# Patient Record
Sex: FEMALE | Race: WHITE | NOT HISPANIC OR LATINO | Employment: UNEMPLOYED | URBAN - METROPOLITAN AREA
[De-identification: names, ages, dates, MRNs, and addresses within clinical notes are randomized per-mention and may not be internally consistent; named-entity substitution may affect disease eponyms.]

---

## 2016-12-15 LAB
CBC, PLATELET CT, AND DIFF: (no result)
EXTERNAL ABO GROUPING: (no result)
EXTERNAL ANTIBODY SCREEN: NORMAL
EXTERNAL HEPATITIS B SURFACE ANTIGEN: NEGATIVE
EXTERNAL RH FACTOR: POSITIVE
EXTERNAL RUBELLA QUALITATIVE: (no result)
EXTERNAL SYPHILIS RPR SCREEN: (no result)
HIV1 AB SPEC QL IA.RAPID: NORMAL

## 2017-01-12 VITALS
WEIGHT: 147 LBS | SYSTOLIC BLOOD PRESSURE: 122 MMHG | DIASTOLIC BLOOD PRESSURE: 58 MMHG | HEIGHT: 64 IN | BODY MASS INDEX: 25.1 KG/M2

## 2017-06-10 ENCOUNTER — HOSPITAL ENCOUNTER (OUTPATIENT)
Facility: HOSPITAL | Age: 27
Setting detail: OBSERVATION
Discharge: HOME | End: 2017-06-10
Attending: OBSTETRICS & GYNECOLOGY | Admitting: OBSTETRICS & GYNECOLOGY

## 2017-06-10 VITALS
RESPIRATION RATE: 18 BRPM | WEIGHT: 175 LBS | DIASTOLIC BLOOD PRESSURE: 57 MMHG | TEMPERATURE: 98.1 F | SYSTOLIC BLOOD PRESSURE: 111 MMHG | BODY MASS INDEX: 31.01 KG/M2 | HEART RATE: 108 BPM | HEIGHT: 63 IN

## 2017-06-10 PROBLEM — Z34.90 PATIENT CURRENTLY PREGNANT: Status: ACTIVE | Noted: 2017-06-10

## 2017-06-10 LAB
AMYLASE SERPL-CCNC: 42 U/L (ref 30–118)
ANION GAP SERPL CALCULATED.3IONS-SCNC: 12 MMOL/L (ref 3–11)
BUN BLD-MCNC: 6 MG/DL (ref 9–23)
BUN/CREAT SERPL: 10 (ref 7–25)
CALCIUM SPEC-SCNC: 9.3 MG/DL (ref 8.7–10.4)
CHLORIDE SERPL-SCNC: 109 MMOL/L (ref 99–109)
CO2 SERPL-SCNC: 22 MMOL/L (ref 20–31)
CREAT BLD-MCNC: 0.6 MG/DL (ref 0.6–1.3)
DEPRECATED RDW RBC AUTO: 47 FL (ref 37–54)
ERYTHROCYTE [DISTWIDTH] IN BLOOD BY AUTOMATED COUNT: 13.5 % (ref 11.3–14.5)
GFR SERPL CREATININE-BSD FRML MDRD: 120 ML/MIN/1.73
GLUCOSE BLD-MCNC: 108 MG/DL (ref 70–100)
HCT VFR BLD AUTO: 37.2 % (ref 34.5–44)
HGB BLD-MCNC: 12.4 G/DL (ref 11.5–15.5)
LIPASE SERPL-CCNC: 28 U/L (ref 6–51)
MCH RBC QN AUTO: 32.2 PG (ref 27–31)
MCHC RBC AUTO-ENTMCNC: 33.3 G/DL (ref 32–36)
MCV RBC AUTO: 96.6 FL (ref 80–99)
PLATELET # BLD AUTO: 215 10*3/MM3 (ref 150–450)
PMV BLD AUTO: 9.9 FL (ref 6–12)
POTASSIUM BLD-SCNC: 3.4 MMOL/L (ref 3.5–5.5)
RBC # BLD AUTO: 3.85 10*6/MM3 (ref 3.89–5.14)
SODIUM BLD-SCNC: 143 MMOL/L (ref 132–146)
WBC NRBC COR # BLD: 12.44 10*3/MM3 (ref 3.5–10.8)

## 2017-06-10 PROCEDURE — 96375 TX/PRO/DX INJ NEW DRUG ADDON: CPT

## 2017-06-10 PROCEDURE — 25010000002 ONDANSETRON PER 1 MG: Performed by: OBSTETRICS & GYNECOLOGY

## 2017-06-10 PROCEDURE — 25010000002 METOCLOPRAMIDE PER 10 MG: Performed by: OBSTETRICS & GYNECOLOGY

## 2017-06-10 PROCEDURE — 82150 ASSAY OF AMYLASE: CPT | Performed by: OBSTETRICS & GYNECOLOGY

## 2017-06-10 PROCEDURE — 96376 TX/PRO/DX INJ SAME DRUG ADON: CPT

## 2017-06-10 PROCEDURE — G0378 HOSPITAL OBSERVATION PER HR: HCPCS

## 2017-06-10 PROCEDURE — 96374 THER/PROPH/DIAG INJ IV PUSH: CPT

## 2017-06-10 PROCEDURE — 83690 ASSAY OF LIPASE: CPT | Performed by: OBSTETRICS & GYNECOLOGY

## 2017-06-10 PROCEDURE — 59025 FETAL NON-STRESS TEST: CPT

## 2017-06-10 PROCEDURE — 80048 BASIC METABOLIC PNL TOTAL CA: CPT | Performed by: OBSTETRICS & GYNECOLOGY

## 2017-06-10 PROCEDURE — 85027 COMPLETE CBC AUTOMATED: CPT | Performed by: OBSTETRICS & GYNECOLOGY

## 2017-06-10 RX ORDER — SODIUM CHLORIDE 0.9 % (FLUSH) 0.9 %
1-10 SYRINGE (ML) INJECTION AS NEEDED
Status: DISCONTINUED | OUTPATIENT
Start: 2017-06-10 | End: 2017-06-10 | Stop reason: HOSPADM

## 2017-06-10 RX ORDER — LIDOCAINE HYDROCHLORIDE 10 MG/ML
5 INJECTION, SOLUTION INFILTRATION; PERINEURAL AS NEEDED
Status: DISCONTINUED | OUTPATIENT
Start: 2017-06-10 | End: 2017-06-10 | Stop reason: HOSPADM

## 2017-06-10 RX ORDER — ONDANSETRON 4 MG/1
4 TABLET, FILM COATED ORAL EVERY 8 HOURS PRN
Qty: 20 TABLET | Refills: 0 | Status: SHIPPED | OUTPATIENT
Start: 2017-06-10 | End: 2018-08-23

## 2017-06-10 RX ORDER — METOCLOPRAMIDE HYDROCHLORIDE 5 MG/ML
10 INJECTION INTRAMUSCULAR; INTRAVENOUS ONCE
Status: COMPLETED | OUTPATIENT
Start: 2017-06-10 | End: 2017-06-10

## 2017-06-10 RX ORDER — ONDANSETRON 2 MG/ML
4 INJECTION INTRAMUSCULAR; INTRAVENOUS EVERY 6 HOURS PRN
Status: DISCONTINUED | OUTPATIENT
Start: 2017-06-10 | End: 2017-06-10 | Stop reason: HOSPADM

## 2017-06-10 RX ORDER — DEXTROSE, SODIUM CHLORIDE, SODIUM LACTATE, POTASSIUM CHLORIDE, AND CALCIUM CHLORIDE 5; .6; .31; .03; .02 G/100ML; G/100ML; G/100ML; G/100ML; G/100ML
125 INJECTION, SOLUTION INTRAVENOUS CONTINUOUS
Status: DISCONTINUED | OUTPATIENT
Start: 2017-06-10 | End: 2017-06-10 | Stop reason: HOSPADM

## 2017-06-10 RX ORDER — ONDANSETRON 2 MG/ML
4 INJECTION INTRAMUSCULAR; INTRAVENOUS ONCE
Status: COMPLETED | OUTPATIENT
Start: 2017-06-10 | End: 2017-06-10

## 2017-06-10 RX ORDER — FERROUS SULFATE TAB EC 324 MG (65 MG FE EQUIVALENT) 324 (65 FE) MG
324 TABLET DELAYED RESPONSE ORAL
COMMUNITY
End: 2018-08-23

## 2017-06-10 RX ORDER — RANITIDINE 150 MG/1
300 TABLET ORAL 2 TIMES DAILY
COMMUNITY
End: 2018-08-23

## 2017-06-10 RX ORDER — FAMOTIDINE 10 MG/ML
20 INJECTION, SOLUTION INTRAVENOUS EVERY 12 HOURS SCHEDULED
Status: DISCONTINUED | OUTPATIENT
Start: 2017-06-10 | End: 2017-06-10 | Stop reason: HOSPADM

## 2017-06-10 RX ADMIN — FAMOTIDINE 20 MG: 10 INJECTION, SOLUTION INTRAVENOUS at 06:19

## 2017-06-10 RX ADMIN — SODIUM CHLORIDE, SODIUM LACTATE, POTASSIUM CHLORIDE, CALCIUM CHLORIDE AND DEXTROSE MONOHYDRATE 125 ML/HR: 5; 600; 310; 30; 20 INJECTION, SOLUTION INTRAVENOUS at 06:22

## 2017-06-10 RX ADMIN — ONDANSETRON 4 MG: 2 INJECTION INTRAMUSCULAR; INTRAVENOUS at 18:20

## 2017-06-10 RX ADMIN — SODIUM CHLORIDE, SODIUM LACTATE, POTASSIUM CHLORIDE, CALCIUM CHLORIDE AND DEXTROSE MONOHYDRATE 125 ML/HR: 5; 600; 310; 30; 20 INJECTION, SOLUTION INTRAVENOUS at 09:08

## 2017-06-10 RX ADMIN — ONDANSETRON 4 MG: 2 INJECTION INTRAMUSCULAR; INTRAVENOUS at 06:20

## 2017-06-10 RX ADMIN — ONDANSETRON 4 MG: 2 INJECTION INTRAMUSCULAR; INTRAVENOUS at 11:45

## 2017-06-10 RX ADMIN — FAMOTIDINE 20 MG: 10 INJECTION, SOLUTION INTRAVENOUS at 18:19

## 2017-06-10 RX ADMIN — METOCLOPRAMIDE 10 MG: 5 INJECTION, SOLUTION INTRAMUSCULAR; INTRAVENOUS at 06:21

## 2017-06-10 RX ADMIN — SODIUM CHLORIDE, SODIUM LACTATE, POTASSIUM CHLORIDE, CALCIUM CHLORIDE AND DEXTROSE MONOHYDRATE 125 ML/HR: 5; 600; 310; 30; 20 INJECTION, SOLUTION INTRAVENOUS at 17:47

## 2017-06-10 NOTE — PLAN OF CARE
Problem: Nausea/Vomiting in Pregnancy, Includes Hyperemesis (Adult,Obstetrics,Pediatric)  Goal: Signs and Symptoms of Listed Potential Problems Will be Absent or Manageable (Nausea/Vomiting in Pregnancy, Includes Hyperemesis)  Outcome: Ongoing (interventions implemented as appropriate)    06/10/17 0837   Nausea/Vomiting in Pregnancy, Includes Hyperemesis   Problems Assessed (Nausea/Vomiting/Hyperemesis in Pregnancy) all   Problems Present (Nausea/Vomiting/Hyperemesis in Pregnancy) fluid/electrolyte imbalance;nutritional deficiency/inadequate oral intake

## 2017-06-10 NOTE — PLAN OF CARE
Problem: Nausea/Vomiting in Pregnancy, Includes Hyperemesis (Adult,Obstetrics,Pediatric)  Intervention: Monitor/Manage Fluid Electrolyte Balance    06/10/17 0837   Positioning   Positioning semi-Fowlers   Nutrition Interventions   Fluid/Electrolyte Management intravenous fluid replacement initiated;oral rehydration therapy initiated

## 2017-06-10 NOTE — H&P
"[facility]  Obstetric History and Physical    Chief Complaint   Patient presents with   • Vomiting     started ~230am after eating [employer] the evening prior approx 2030. NVD all in rapid sequence over the next ~2hrs w/\"10 bouts of diarrhea back to back.\"       Subjective     Patient is a 27 y.o. female  currently at 32w1d, who presented to labor and delivery with nausea, vomiting, and diarrhea.  She reports eating fast food at [employer] at approximately 8:30pm last night.  She began to experience \"flu-like symptoms\" around 10:30pm.  She reports n/v/d and abdominal pain began at 2:30 this morning.  Her last episode of vomiting was at 5:30am.  She has tolerated sips of clears after IV zofran.      Her prenatal care is benign.  Her previous obstetric/gynecological history is noted for is non-contributory.    The following portions of the patients history were reviewed and updated as appropriate: current medications, allergies, past medical history, past surgical history, past family history, past social history and problem list .       Prenatal Information:  Prenatal Results         1st Trimester Ref. Range Date Time   CBC with auto diff ^ 12.5/36.8   12/15/16    Rubella IgG       Hepatitis B SAg ^ Negative   12/15/16    RPR ^ Non-Reactive   12/15/16    ABO ^ O   12/15/16    Rh ^ Positive   12/15/16    Anibody Screen ^ Normal  Normal 12/15/16    HIV       Varicella IgG       Urinalysis with microscopy       Urine Culture       GC/Chlamydia/TV       ThinPrep/Pap       2nd and 3rd Trimester Ref. Range Date Time   Hemoglobin / Hematocrit  37.2 % 34.5 - 44.0 % 06/10/17 0627   Hemoglobin  12.4 g/dL 11.5 - 15.5 g/dL 06/10/17 0627   Group B Strep Culture       Glucose Challenge Test 1 Hr       Glucose Fasting       Glucose 1 Hr       Glucose 2 Hr       Glucose 3 Hr       Pre-eclampsia Panel       Risk Screening Ref. Range Date Time   Fetal Fibronectin       Amnisure       Hepatitis C Antibody       Hemoglobin " electrophoresis       Cystic Fibrosis       Hemoglobin A1C       MSAFP - 4       NIPT       AFP       Parvovirus IgG       Parvovirus IgM       POCT - glucose       [facility]       24 Hour urine - Total protein       24 Hour urine - Creatinine clearance       Urinalysis with microscopy       Urine Culture       Drug Screening Ref. Range Date Time   Amphetamine Screen       Barbiturate Screen       Benzodiazepine Screen       Methadone Screen       Phencyclidine Screen       Opiates Screen       THC Screen       Cocaine Screen       Propoxyphene Screen       Buprenorphine Screen       Methamphetamine Screen       Oxycodone Screen       Tryicyclic Antidepressants Screen              Legend: ^: Historical            View all results for this pregnancy        External Prenatal Results         Pregnancy Outside Results - these were transcribed from office records.  See scanned records for details. Date Time   Hgb      Hct      ABO ^ O  12/15/16    Rh ^ Positive  12/15/16    Antibody Screen ^ Normal  12/15/16    Glucose Fasting GTT      Glucose Tolerance Test 1 hour      Glucose Tolerance Test 3 hour      Gonorrhea (discrete)      Chlamydia (discrete)      RPR ^ Non-Reactive  12/15/16    VDRL      Syphillis Antibody      Rubella ^ Immune  12/15/16    HBsAg ^ Negative  12/15/16    Herpes Simplex Virus PCR      Herpes Simplex VIrus Culture      HIV ^ Not Performed  12/15/16    Hep C RNA Quant PCR      Hep C Antibody      Urine Drug Screen      AFP      Group B Strep      GBS Susceptibility to Clindamycin      GBS Susceptibility to Eythromycin      Fetal Fibronectin      Genetic Testing, Maternal Blood             Legend: ^: Historical           Past OB History:     Obstetric History       T0      TAB0   SAB0   E0   M0   L0       # Outcome Date GA Lbr Manuel/2nd Weight Sex Delivery Anes PTL Lv   1 Current                   Past Medical History: Past Medical History:   Diagnosis Date   • Abnormal Pap smear of  cervix age 18    HPV, normal Paps since   • Anxiety and depression 2013   • Back pain 2013   • Fibromyalgia 2013   • Genital herpes during pregnancy     last outbreak was @ conception   • H/O colonoscopy with polypectomy 2016   • Head ache    • HPV (human papilloma virus) infection    • Panic attack 2013   • Urinary tract infection       Past Surgical History Past Surgical History:   Procedure Laterality Date   • ADENOIDECTOMY     • TONSILLECTOMY  2014   • TONSILLECTOMY Bilateral 1994   • UMBILICAL HERNIA REPAIR  1991      Family History: Family History   Problem Relation Age of Onset   • Hypertension Mother    • Diabetes Mother    • Colon polyps Mother    • Hypertension Father    • Diabetes Father    • Heart attack Father    • Alcohol abuse Maternal Uncle    • Liver disease Maternal Uncle    • Colon cancer Neg Hx    • Liver cancer Neg Hx    • Stomach cancer Neg Hx    • Esophageal cancer Neg Hx       Social History:  reports that she quit smoking about 17 months ago. She has a 1.50 pack-year smoking history. She has never used smokeless tobacco.   reports that she drinks alcohol.   reports that she does not use illicit drugs.        Review of Systems   Constitutional: Negative.    HENT: Negative.    Eyes: Negative.    Respiratory: Negative.    Cardiovascular: Negative.    Gastrointestinal: Positive for abdominal pain, diarrhea, nausea and vomiting. Negative for abdominal distention, anal bleeding, blood in stool, constipation and rectal pain.   Endocrine: Negative.    Genitourinary: Negative.    Musculoskeletal: Negative.    Skin: Negative.    Allergic/Immunologic: Negative.    Neurological: Negative.    Hematological: Negative.    Psychiatric/Behavioral: Negative.          Objective     Vital Signs Range for the last 24 hours  Temperature: Temp:  [98.1 °F (36.7 °C)-98.5 °F (36.9 °C)] 98.5 °F (36.9 °C)   Temp Source: Temp src: Oral   BP: BP: (125-128)/(75-86) 125/75   Pulse: Heart Rate:  [114-122] 114    Respirations: Resp:  [18] 18   SPO2:     O2 Amount (l/min):     O2 Devices     Weight: Weight:  [175 lb (79.4 kg)] 175 lb (79.4 kg)     Physical Examination: General appearance - alert, well appearing, and in no distress  Chest - clear to auscultation, no wheezes, rales or rhonchi, symmetric air entry  Heart - normal rate, regular rhythm, normal S1, S2, no murmurs, rubs, clicks or gallops  Abdomen - soft, nontender, nondistended, no masses or organomegaly  Musculoskeletal - no joint tenderness, deformity or swelling  Extremities - peripheral pulses normal, no pedal edema, no clubbing or cyanosis    Presentation: vertex   Cervix: Exam by:  defer   Dilation:     Effacement:     Station:       Fetal Heart Rate Assessment   Method: Fetal HR Assessment Method: external   Beats/min: Fetal HR (Beats/Min): 145   Baseline: Fetal HR Baseline: normal range (110-160 bpm)   Varibility: Fetal HR Variability: moderate (amplitude range 6 to 25 bpm)   Accels: Fetal HR Accelerations: episodic, lasting at least 15 seconds, greater than/equal to 15 bpm   Decels: Fetal HR Decelerations: absent   Tracing Category:       Uterine Assessment   Method: Method: TOCO (external toco transducer)   Frequency (min):     Ctx Count in 10 min: Contraction Frequency in 10min: 1   Duration: Contraction Duration (sec): 40   Intensity: Contraction Intensity: no contractions   Intensity by IUPC:     Resting Tone: Uterine Resting Tone: soft by palpation   Resting Tone by IUPC:     [place] Units:       Laboratory Results: WNL, see labs on chart      Assessment/Plan     Active Problems:    Patient currently pregnant      Assessment & Plan    Assessment:  1.  Intrauterine pregnancy at 32w1d weeks gestation with reactive fetal status.    2.  Gastrointestinal illness, possible food bourne illness  3.  Obstetrical history significant for is non-contributory.  4.  GBS status: No results found for: GBSANTIGEN    Plan:  1. Continue observation and hydration.   Will consider discharge home this evening if no n/v/d and tolerating po.   2. Plan of care has been reviewed with patient and she verbalizes understanding  3.  Risks, benefits of treatment plan have been discussed.  4.  All questions have been answered.        Katie Andrew CNM  6/10/2017  4:16 PM

## 2017-06-10 NOTE — PLAN OF CARE
Problem: Nausea/Vomiting in Pregnancy, Includes Hyperemesis (Adult,Obstetrics,Pediatric)  Intervention: Promote/Monitor Maternal/Fetal Well-being    06/10/17 0837   Positioning   Positioning semi-Fowlers   Promote Fetal Well-being   Fetal Movement active   Fetal HR Assessment Method external   Fetal HR (Beats/Min) 145   Fetal HR Baseline normal range (110-160 bpm)   Fetal HR Variability moderate (amplitude range 6 to 25 bpm)   Fetal HR Accelerations episodic;lasting at least 15 seconds;greater than/equal to 15 bpm   Fetal HR Decelerations absent

## 2017-06-14 ENCOUNTER — HOSPITAL ENCOUNTER (OUTPATIENT)
Facility: HOSPITAL | Age: 27
Setting detail: OBSERVATION
Discharge: HOME | End: 2017-06-15
Attending: OBSTETRICS & GYNECOLOGY | Admitting: OBSTETRICS & GYNECOLOGY

## 2017-06-14 VITALS
HEART RATE: 85 BPM | HEIGHT: 63 IN | DIASTOLIC BLOOD PRESSURE: 71 MMHG | WEIGHT: 175 LBS | SYSTOLIC BLOOD PRESSURE: 124 MMHG | RESPIRATION RATE: 18 BRPM | TEMPERATURE: 98.2 F | BODY MASS INDEX: 31.01 KG/M2

## 2017-06-14 PROCEDURE — 59025 FETAL NON-STRESS TEST: CPT

## 2017-06-14 PROCEDURE — G0378 HOSPITAL OBSERVATION PER HR: HCPCS

## 2017-06-14 RX ORDER — PROMETHAZINE HYDROCHLORIDE 12.5 MG/1
12.5 TABLET ORAL EVERY 6 HOURS PRN
COMMUNITY
End: 2017-08-09 | Stop reason: HOSPADM

## 2017-06-15 PROBLEM — M79.661 RIGHT CALF PAIN: Status: ACTIVE | Noted: 2017-06-15

## 2017-06-15 PROCEDURE — 59025 FETAL NON-STRESS TEST: CPT

## 2017-06-15 PROCEDURE — 59025 FETAL NON-STRESS TEST: CPT | Performed by: OBSTETRICS & GYNECOLOGY

## 2017-06-15 PROCEDURE — 99218 PR INITIAL OBSERVATION CARE/DAY 30 MINUTES: CPT | Performed by: OBSTETRICS & GYNECOLOGY

## 2017-06-15 NOTE — NURSING NOTE
Discharge instructions reviewed with pt and pt's family. Pt denies any further questions and declines wheelchair ride off unit. Pt ambulated off unit accompanied by spouse and mother.

## 2017-06-15 NOTE — H&P
"Georgie Winston  1990  [id number]  [id number]    CC: rt leg swelling and pain  HPI:  Patient is 27 y.o. white female   currently at 33w1d  Presents with c/o rt calf and ankle pain for past 3 days.  Pt describes her pain as \"stiffness\" and pain with walking.  Pt also says calf feels tight.  Denies hx clots.  Pt recently with food poisoning over the weekend.  Pt's mother with hx of DVT (no formal w/u, only had one DVT).  BPNC to date except controlled asthma.  Good FM    PMH:   Current meds PNV, Fe, stool softener, phenergan prn, inhaler, zantac  Illnesses asthma, depression/anxiety, UTI's , GERD  Surgeries umb hernia repair, T and A, EGD, colonoscopy  Allergies Cipro-\"altered mental state\",                 Azithromycin and Erythromycin-severe N,V                Demerol-hives     IV contrast-anaphylactic reaction     shellfish    Past OB History:       Obstetric History       T0      TAB0   SAB0   E0   M0   L0       # Outcome Date GA Lbr Manuel/2nd Weight Sex Delivery Anes PTL Lv   1 Current                        SH: tob neg , EtOH neg, drugs neg  FH: heart dz pos , diabetes pos , cancer pos    General ROS: All systems reviewed and negative except for N, tingling legs, edema      Physical Examination: General appearance - alert, well appearing, and in no distress  Vital signs - /71 (BP Location: Left arm, Patient Position: Sitting)  Pulse 85  Temp 98.2 °F (36.8 °C) (Oral)   Resp 18  Ht 63\" (160 cm)  Wt 175 lb (79.4 kg)  LMP 10/25/2016  BMI 31 kg/m2  HEENT: normocephalic, atraumatic, pharynx clear   Neck - supple, no significant adenopathy  Lymphatics - no palpable lymphadenopathy, no hepatosplenomegaly  Chest - clear to auscultation, no wheezes, rales or rhonchi, symmetric air entry  Heart - normal rate, regular rhythm, normal S1, S2, no murmurs, rubs, clicks or gallops, no JVD  Abdomen - soft, nontender, nondistended, no masses or organomegaly  no rebound tenderness noted, bowel " sounds normal  Vaginal Exam: deferred  Extremities - tract pedal edema noted, rt and leg calf measure 41 cm at mid-shin, no apprec calf tend, no warmth, neg Corky's sign bilat, distal pedal pulses normal bilat  Skin - normal coloration and turgor, no rashes, no suspicious skin lesions noted        Fetal monitoring: indication leg pain , onset 2322 , offset 0006 , baseline 135 , mod BTB variability , multiple accels (10 X 10), no decels, rare contractions, interpretation reactive NST    Radiology     Assessment 1)IUP 33 1/7 weeks       2)rt leg pain-not c/w DVT, both legs = diameter, no tend, warmth, pain- prob due to usual leg cramps    Plan 1)observe       2)rec tx with magnesium (almonds) and Ca (milk, yogurt, etc)    3)keep next appt    Antione Vazquez MD  6/15/2017  12:07 AM

## 2017-07-08 LAB — EXTERNAL GROUP B STREP ANTIGEN: NEGATIVE

## 2017-07-10 ENCOUNTER — HOSPITAL ENCOUNTER (OUTPATIENT)
Facility: HOSPITAL | Age: 27
Setting detail: OBSERVATION
Discharge: HOME | End: 2017-07-10
Attending: OBSTETRICS & GYNECOLOGY | Admitting: OBSTETRICS & GYNECOLOGY

## 2017-07-10 VITALS
HEIGHT: 63 IN | SYSTOLIC BLOOD PRESSURE: 118 MMHG | DIASTOLIC BLOOD PRESSURE: 68 MMHG | WEIGHT: 182 LBS | TEMPERATURE: 97.6 F | RESPIRATION RATE: 18 BRPM | BODY MASS INDEX: 32.25 KG/M2 | HEART RATE: 83 BPM

## 2017-07-10 PROBLEM — Z34.90 PREGNANCY: Status: ACTIVE | Noted: 2017-07-10

## 2017-07-10 LAB
BACTERIA UR QL AUTO: ABNORMAL /HPF
BILIRUB UR QL STRIP: NEGATIVE
CLARITY UR: CLEAR
COLOR UR: YELLOW
GLUCOSE UR STRIP-MCNC: NEGATIVE MG/DL
HGB UR QL STRIP.AUTO: NEGATIVE
HYALINE CASTS UR QL AUTO: ABNORMAL /LPF
KETONES UR QL STRIP: NEGATIVE
LEUKOCYTE ESTERASE UR QL STRIP.AUTO: ABNORMAL
NITRITE UR QL STRIP: NEGATIVE
PH UR STRIP.AUTO: 7 [PH] (ref 5–8)
PROT UR QL STRIP: NEGATIVE
RBC # UR: ABNORMAL /HPF
REF LAB TEST METHOD: ABNORMAL
SP GR UR STRIP: 1.01 (ref 1–1.03)
SQUAMOUS #/AREA URNS HPF: ABNORMAL /HPF
UROBILINOGEN UR QL STRIP: ABNORMAL
WBC UR QL AUTO: ABNORMAL /HPF

## 2017-07-10 PROCEDURE — 81001 URINALYSIS AUTO W/SCOPE: CPT | Performed by: NURSE PRACTITIONER

## 2017-07-10 PROCEDURE — 59025 FETAL NON-STRESS TEST: CPT

## 2017-07-10 PROCEDURE — G0378 HOSPITAL OBSERVATION PER HR: HCPCS

## 2017-07-10 RX ORDER — VALACYCLOVIR HYDROCHLORIDE 500 MG/1
500 TABLET, FILM COATED ORAL DAILY
COMMUNITY
End: 2018-08-23

## 2017-07-10 NOTE — NURSING NOTE
Discharge instructions reviewed with patient and family. Instructed patient to return if contractions become regular and are 5-7 min apart, if leaking fluid, vaginal bleeding or decreased fetal movement. Patient verbalized understanding. Patient walked off unit with mother, significant other and all of her belongings.

## 2017-07-10 NOTE — H&P
[facility]  Obstetric History and Physical    Chief Complaint   Patient presents with   • Contractions       Subjective     Patient is a 27 y.o. female  currently at 36w5d, who presents with complaints of contractions since 0400, irregular. She reports emesis x 1 this am and increased acid reflux. She reports positive fetal movement. Denies vaginal bleeding or loss of fluid.     Her prenatal care is benign.  Her previous obstetric/gynecological history is noted for is non-contributory.    The following portions of the patients history were reviewed and updated as appropriate: current medications, allergies, past medical history, past surgical history, past family history, past social history and problem list .       Prenatal Information:  Prenatal Results         1st Trimester Ref. Range Date Time   CBC with auto diff ^ 12.5/36.8   12/15/16    Rubella IgG       Hepatitis B SAg ^ Negative   12/15/16    RPR ^ Non-Reactive   12/15/16    ABO ^ O   12/15/16    Rh ^ Positive   12/15/16    Anibody Screen ^ Normal  Normal 12/15/16    HIV       Varicella IgG       Urinalysis with microscopy       Urine Culture       GC/Chlamydia/TV       ThinPrep/Pap       2nd and 3rd Trimester Ref. Range Date Time   Hemoglobin / Hematocrit  37.2 % 34.5 - 44.0 % 06/10/17 0627   Hemoglobin  12.4 g/dL 11.5 - 15.5 g/dL 06/10/17 0627   Group B Strep Culture       Glucose Challenge Test 1 Hr       Glucose Fasting       Glucose 1 Hr       Glucose 2 Hr       Glucose 3 Hr       Pre-eclampsia Panel       Risk Screening Ref. Range Date Time   Fetal Fibronectin       Amnisure       Hepatitis C Antibody       Hemoglobin electrophoresis       Cystic Fibrosis       Hemoglobin A1C       MSAFP - 4       NIPT       AFP       Parvovirus IgG       Parvovirus IgM       POCT - glucose       [facility]       24 Hour urine - Total protein       24 Hour urine - Creatinine clearance       Urinalysis with microscopy       Urine Culture       Drug Screening Ref.  Range Date Time   Amphetamine Screen       Barbiturate Screen       Benzodiazepine Screen       Methadone Screen       Phencyclidine Screen       Opiates Screen       THC Screen       Cocaine Screen       Propoxyphene Screen       Buprenorphine Screen       Methamphetamine Screen       Oxycodone Screen       Tryicyclic Antidepressants Screen              Legend: ^: Historical            View all results for this pregnancy        External Prenatal Results         Pregnancy Outside Results - these were transcribed from office records.  See scanned records for details. Date Time   Hgb      Hct      ABO ^ O  12/15/16    Rh ^ Positive  12/15/16    Antibody Screen ^ Normal  12/15/16    Glucose Fasting GTT      Glucose Tolerance Test 1 hour      Glucose Tolerance Test 3 hour      Gonorrhea (discrete)      Chlamydia (discrete)      RPR ^ Non-Reactive  12/15/16    VDRL      Syphillis Antibody      Rubella ^ Immune  12/15/16    HBsAg ^ Negative  12/15/16    Herpes Simplex Virus PCR      Herpes Simplex VIrus Culture      HIV ^ Not Performed  12/15/16    Hep C RNA Quant PCR      Hep C Antibody      Urine Drug Screen      AFP      Group B Strep      GBS Susceptibility to Clindamycin      GBS Susceptibility to Eythromycin      Fetal Fibronectin      Genetic Testing, Maternal Blood             Legend: ^: Historical           Past OB History:     Obstetric History       T0      TAB0   SAB0   E0   M0   L0       # Outcome Date GA Lbr Manuel/2nd Weight Sex Delivery Anes PTL Lv   1 Current                   Past Medical History: Past Medical History:   Diagnosis Date   • Abnormal Pap smear of cervix age 18    HPV, normal Paps since   • Anxiety and depression    • Back pain    • Fibromyalgia    • Genital herpes during pregnancy     last outbreak was @ conception   • H/O colonoscopy with polypectomy    • Head ache    • HPV (human papilloma virus) infection    • Panic attack    • Thoracic nerve injury      left   • Urinary tract infection       Past Surgical History Past Surgical History:   Procedure Laterality Date   • ADENOIDECTOMY     • ENDOSCOPY AND COLONOSCOPY     • TONSILLECTOMY  2014   • TONSILLECTOMY Bilateral 1994   • UMBILICAL HERNIA REPAIR  1991      Family History: Family History   Problem Relation Age of Onset   • Hypertension Mother    • Diabetes Mother    • Colon polyps Mother    • Hypertension Father    • Diabetes Father    • Heart attack Father    • Alcohol abuse Maternal Uncle    • Liver disease Maternal Uncle    • Colon cancer Neg Hx    • Liver cancer Neg Hx    • Stomach cancer Neg Hx    • Esophageal cancer Neg Hx       Social History:  reports that she quit smoking about 18 months ago. She has a 1.50 pack-year smoking history. She has never used smokeless tobacco.   reports that she does not drink alcohol.   reports that she does not use illicit drugs.        Review of Systems   Gastrointestinal: Positive for nausea and vomiting.   All other systems reviewed and are negative.        Objective     Vital Signs Range for the last 24 hours  Temperature: Temp:  [98.1 °F (36.7 °C)] 98.1 °F (36.7 °C)   Temp Source: Temp src: Oral   BP:     Pulse:     Respirations: Resp:  [18] 18   SPO2:     O2 Amount (l/min):     O2 Devices     Weight: Weight:  [182 lb (82.6 kg)] 182 lb (82.6 kg)     Physical Examination: General appearance - alert, well appearing, and in no distress  Heart - normal rate, regular rhythm, normal S1, S2, no murmurs, rubs, clicks or gallops  Abdomen - soft, nontender, nondistended, no masses or organomegaly, gravid  Musculoskeletal - no joint tenderness, deformity or swelling  Extremities - peripheral pulses normal, 2+  pedal edema, no clubbing or cyanosis  Skin - normal coloration and turgor, no rashes, no suspicious skin lesions noted    Presentation:    Cervix: Exam by:     Dilation: Dilation: 1   Effacement: Cervical Effacement: 60%   Station:       Fetal Heart Rate Assessment   Method:      Beats/min: Fetal HR (Beats/Min): 140   Baseline:     Varibility: Fetal HR Variability: moderate (amplitude range 6 to 25 bpm)   Accels: Fetal HR Accelerations: greater than/equal to 15 bpm   Decels: Fetal HR Decelerations: absent   Tracing Category:       Uterine Assessment   Method:     Frequency (min): Contraction Frequency (min): x1   Ctx Count in 10 min:     Duration: Contraction Duration (sec): 120   Intensity:     Intensity by IUPC:     Resting Tone: Uterine Resting Tone: soft by palpation   Resting Tone by IUPC:     [place] Units:         Assessment/Plan     Active Problems:    Pregnancy      Assessment & Plan    Assessment:  1.  Intrauterine pregnancy at 36w5d weeks gestation with reactive, reassuring fetal status.  Reactive NST.  2.  Irregular contractions (2 in 1 hour)  3.ua wnl  Plan:  1. discharge to home with labor precautions. Keep scheduled appt with Dr Diop tomorrow.   2. Plan of care has been reviewed with patient and famliy  3.  Risks, benefits of treatment plan have been discussed.  4.  All questions have been answered.        Tamica Disla CNM  7/10/2017  7:05 PM

## 2017-08-04 ENCOUNTER — HOSPITAL ENCOUNTER (INPATIENT)
Facility: HOSPITAL | Age: 27
LOS: 5 days | Discharge: HOME | End: 2017-08-09
Attending: OBSTETRICS & GYNECOLOGY | Admitting: OBSTETRICS & GYNECOLOGY

## 2017-08-04 DIAGNOSIS — B37.0 THRUSH, ORAL: ICD-10-CM

## 2017-08-04 DIAGNOSIS — Z37.9 NORMAL LABOR: Primary | ICD-10-CM

## 2017-08-04 LAB
ABO GROUP BLD: NORMAL
ALP SERPL-CCNC: 110 U/L (ref 25–100)
ALT SERPL W P-5'-P-CCNC: 17 U/L (ref 7–40)
AST SERPL-CCNC: 28 U/L (ref 0–33)
BILIRUB SERPL-MCNC: 0.4 MG/DL (ref 0.3–1.2)
BLD GP AB SCN SERPL QL: NEGATIVE
CREAT BLD-MCNC: 0.5 MG/DL (ref 0.6–1.3)
DEPRECATED RDW RBC AUTO: 50 FL (ref 37–54)
ERYTHROCYTE [DISTWIDTH] IN BLOOD BY AUTOMATED COUNT: 14.4 % (ref 11.3–14.5)
HCT VFR BLD AUTO: 33.1 % (ref 34.5–44)
HGB BLD-MCNC: 11.1 G/DL (ref 11.5–15.5)
LDH SERPL-CCNC: 221 U/L (ref 120–246)
MCH RBC QN AUTO: 32.2 PG (ref 27–31)
MCHC RBC AUTO-ENTMCNC: 33.5 G/DL (ref 32–36)
MCV RBC AUTO: 95.9 FL (ref 80–99)
PLATELET # BLD AUTO: 185 10*3/MM3 (ref 150–450)
PMV BLD AUTO: 9.8 FL (ref 6–12)
RBC # BLD AUTO: 3.45 10*6/MM3 (ref 3.89–5.14)
RH BLD: POSITIVE
URATE SERPL-MCNC: 3.7 MG/DL (ref 3.1–7.8)
WBC NRBC COR # BLD: 8.51 10*3/MM3 (ref 3.5–10.8)

## 2017-08-04 PROCEDURE — 84460 ALANINE AMINO (ALT) (SGPT): CPT | Performed by: OBSTETRICS & GYNECOLOGY

## 2017-08-04 PROCEDURE — 84450 TRANSFERASE (AST) (SGOT): CPT | Performed by: OBSTETRICS & GYNECOLOGY

## 2017-08-04 PROCEDURE — 86900 BLOOD TYPING SEROLOGIC ABO: CPT | Performed by: OBSTETRICS & GYNECOLOGY

## 2017-08-04 PROCEDURE — 82247 BILIRUBIN TOTAL: CPT | Performed by: OBSTETRICS & GYNECOLOGY

## 2017-08-04 PROCEDURE — 82565 ASSAY OF CREATININE: CPT | Performed by: OBSTETRICS & GYNECOLOGY

## 2017-08-04 PROCEDURE — 59025 FETAL NON-STRESS TEST: CPT

## 2017-08-04 PROCEDURE — 85027 COMPLETE CBC AUTOMATED: CPT | Performed by: OBSTETRICS & GYNECOLOGY

## 2017-08-04 PROCEDURE — 84075 ASSAY ALKALINE PHOSPHATASE: CPT | Performed by: OBSTETRICS & GYNECOLOGY

## 2017-08-04 PROCEDURE — 84550 ASSAY OF BLOOD/URIC ACID: CPT | Performed by: OBSTETRICS & GYNECOLOGY

## 2017-08-04 PROCEDURE — 86901 BLOOD TYPING SEROLOGIC RH(D): CPT | Performed by: OBSTETRICS & GYNECOLOGY

## 2017-08-04 PROCEDURE — 86850 RBC ANTIBODY SCREEN: CPT | Performed by: OBSTETRICS & GYNECOLOGY

## 2017-08-04 PROCEDURE — 83615 LACTATE (LD) (LDH) ENZYME: CPT | Performed by: OBSTETRICS & GYNECOLOGY

## 2017-08-04 RX ORDER — LIDOCAINE HYDROCHLORIDE 10 MG/ML
5 INJECTION, SOLUTION INFILTRATION; PERINEURAL AS NEEDED
Status: DISCONTINUED | OUTPATIENT
Start: 2017-08-04 | End: 2017-08-06 | Stop reason: HOSPADM

## 2017-08-04 RX ORDER — OXYTOCIN/RINGER'S LACTATE 20/1000 ML
125 PLASTIC BAG, INJECTION (ML) INTRAVENOUS CONTINUOUS PRN
Status: DISPENSED | OUTPATIENT
Start: 2017-08-05 | End: 2017-08-05

## 2017-08-04 RX ORDER — OXYTOCIN/RINGER'S LACTATE 30/500 ML
1-4 PLASTIC BAG, INJECTION (ML) INTRAVENOUS
Status: CANCELLED | OUTPATIENT
Start: 2017-08-04

## 2017-08-04 RX ORDER — OXYTOCIN/RINGER'S LACTATE 20/1000 ML
999 PLASTIC BAG, INJECTION (ML) INTRAVENOUS ONCE
Status: DISCONTINUED | OUTPATIENT
Start: 2017-08-04 | End: 2017-08-06 | Stop reason: HOSPADM

## 2017-08-04 RX ORDER — CARBOPROST TROMETHAMINE 250 UG/ML
250 INJECTION, SOLUTION INTRAMUSCULAR AS NEEDED
Status: DISCONTINUED | OUTPATIENT
Start: 2017-08-04 | End: 2017-08-06 | Stop reason: HOSPADM

## 2017-08-04 RX ORDER — MISOPROSTOL 200 UG/1
800 TABLET ORAL AS NEEDED
Status: DISCONTINUED | OUTPATIENT
Start: 2017-08-04 | End: 2017-08-06 | Stop reason: HOSPADM

## 2017-08-04 RX ORDER — SODIUM CHLORIDE 0.9 % (FLUSH) 0.9 %
1-10 SYRINGE (ML) INJECTION AS NEEDED
Status: DISCONTINUED | OUTPATIENT
Start: 2017-08-04 | End: 2017-08-06 | Stop reason: HOSPADM

## 2017-08-04 RX ORDER — OXYTOCIN/RINGER'S LACTATE 30/500 ML
2-24 PLASTIC BAG, INJECTION (ML) INTRAVENOUS
Status: DISCONTINUED | OUTPATIENT
Start: 2017-08-05 | End: 2017-08-09 | Stop reason: HOSPADM

## 2017-08-04 RX ORDER — SODIUM CHLORIDE, SODIUM LACTATE, POTASSIUM CHLORIDE, CALCIUM CHLORIDE 600; 310; 30; 20 MG/100ML; MG/100ML; MG/100ML; MG/100ML
125 INJECTION, SOLUTION INTRAVENOUS CONTINUOUS
Status: DISCONTINUED | OUTPATIENT
Start: 2017-08-04 | End: 2017-08-07

## 2017-08-04 RX ADMIN — SODIUM CHLORIDE, POTASSIUM CHLORIDE, SODIUM LACTATE AND CALCIUM CHLORIDE 125 ML/HR: 600; 310; 30; 20 INJECTION, SOLUTION INTRAVENOUS at 23:01

## 2017-08-04 RX ADMIN — Medication 2 MILLI-UNITS/MIN: at 23:45

## 2017-08-04 RX ADMIN — SODIUM CHLORIDE, POTASSIUM CHLORIDE, SODIUM LACTATE AND CALCIUM CHLORIDE 125 ML/HR: 600; 310; 30; 20 INJECTION, SOLUTION INTRAVENOUS at 18:30

## 2017-08-05 ENCOUNTER — ANESTHESIA EVENT (OUTPATIENT)
Dept: LABOR AND DELIVERY | Facility: HOSPITAL | Age: 27
End: 2017-08-05

## 2017-08-05 ENCOUNTER — ANESTHESIA (OUTPATIENT)
Dept: LABOR AND DELIVERY | Facility: HOSPITAL | Age: 27
End: 2017-08-05

## 2017-08-05 PROCEDURE — 25010000002 ROPIVACAINE PER 1 MG: Performed by: ANESTHESIOLOGY

## 2017-08-05 PROCEDURE — 25010000002 FENTANYL CITRATE (PF) 100 MCG/2ML SOLUTION: Performed by: ANESTHESIOLOGY

## 2017-08-05 PROCEDURE — C1755 CATHETER, INTRASPINAL: HCPCS

## 2017-08-05 PROCEDURE — 59025 FETAL NON-STRESS TEST: CPT

## 2017-08-05 PROCEDURE — 25010000002 BUTORPHANOL PER 1 MG: Performed by: OBSTETRICS & GYNECOLOGY

## 2017-08-05 PROCEDURE — 25010000002 METOCLOPRAMIDE PER 10 MG: Performed by: ANESTHESIOLOGY

## 2017-08-05 PROCEDURE — 25010000002 ONDANSETRON PER 1 MG: Performed by: ANESTHESIOLOGY

## 2017-08-05 PROCEDURE — C1755 CATHETER, INTRASPINAL: HCPCS | Performed by: ANESTHESIOLOGY

## 2017-08-05 PROCEDURE — 25010000003 MORPHINE PER 10 MG: Performed by: ANESTHESIOLOGY

## 2017-08-05 PROCEDURE — 51702 INSERT TEMP BLADDER CATH: CPT

## 2017-08-05 RX ORDER — DIPHENHYDRAMINE HYDROCHLORIDE 50 MG/ML
12.5 INJECTION INTRAMUSCULAR; INTRAVENOUS EVERY 8 HOURS PRN
Status: DISCONTINUED | OUTPATIENT
Start: 2017-08-05 | End: 2017-08-06 | Stop reason: HOSPADM

## 2017-08-05 RX ORDER — MIDAZOLAM HYDROCHLORIDE 1 MG/ML
INJECTION INTRAMUSCULAR; INTRAVENOUS AS NEEDED
Status: DISCONTINUED | OUTPATIENT
Start: 2017-08-05 | End: 2017-08-06 | Stop reason: SURG

## 2017-08-05 RX ORDER — ONDANSETRON 2 MG/ML
4 INJECTION INTRAMUSCULAR; INTRAVENOUS ONCE AS NEEDED
Status: DISCONTINUED | OUTPATIENT
Start: 2017-08-05 | End: 2017-08-06 | Stop reason: HOSPADM

## 2017-08-05 RX ORDER — EPHEDRINE SULFATE/0.9% NACL/PF 50 MG/10ML
10 SYRINGE (ML) INTRAVENOUS
Status: DISCONTINUED | OUTPATIENT
Start: 2017-08-05 | End: 2017-08-06 | Stop reason: HOSPADM

## 2017-08-05 RX ORDER — LIDOCAINE HYDROCHLORIDE AND EPINEPHRINE 20; 5 MG/ML; UG/ML
INJECTION, SOLUTION EPIDURAL; INFILTRATION; INTRACAUDAL; PERINEURAL AS NEEDED
Status: DISCONTINUED | OUTPATIENT
Start: 2017-08-05 | End: 2017-08-06 | Stop reason: SURG

## 2017-08-05 RX ORDER — FENTANYL CITRATE 50 UG/ML
INJECTION, SOLUTION INTRAMUSCULAR; INTRAVENOUS AS NEEDED
Status: DISCONTINUED | OUTPATIENT
Start: 2017-08-05 | End: 2017-08-06 | Stop reason: SURG

## 2017-08-05 RX ORDER — TRISODIUM CITRATE DIHYDRATE AND CITRIC ACID MONOHYDRATE 500; 334 MG/5ML; MG/5ML
30 SOLUTION ORAL ONCE
Status: DISCONTINUED | OUTPATIENT
Start: 2017-08-05 | End: 2017-08-06 | Stop reason: HOSPADM

## 2017-08-05 RX ORDER — CEFAZOLIN SODIUM 2 G/100ML
2 INJECTION, SOLUTION INTRAVENOUS ONCE
Status: COMPLETED | OUTPATIENT
Start: 2017-08-06 | End: 2017-08-06

## 2017-08-05 RX ORDER — METOCLOPRAMIDE HYDROCHLORIDE 5 MG/ML
10 INJECTION INTRAMUSCULAR; INTRAVENOUS ONCE AS NEEDED
Status: COMPLETED | OUTPATIENT
Start: 2017-08-05 | End: 2017-08-05

## 2017-08-05 RX ORDER — LIDOCAINE HYDROCHLORIDE AND EPINEPHRINE 15; 5 MG/ML; UG/ML
INJECTION, SOLUTION EPIDURAL AS NEEDED
Status: DISCONTINUED | OUTPATIENT
Start: 2017-08-05 | End: 2017-08-06 | Stop reason: SURG

## 2017-08-05 RX ORDER — ONDANSETRON 2 MG/ML
INJECTION INTRAMUSCULAR; INTRAVENOUS AS NEEDED
Status: DISCONTINUED | OUTPATIENT
Start: 2017-08-05 | End: 2017-08-06 | Stop reason: SURG

## 2017-08-05 RX ORDER — MORPHINE SULFATE 0.5 MG/ML
INJECTION, SOLUTION EPIDURAL; INTRATHECAL; INTRAVENOUS AS NEEDED
Status: DISCONTINUED | OUTPATIENT
Start: 2017-08-05 | End: 2017-08-06 | Stop reason: SURG

## 2017-08-05 RX ORDER — ACETAMINOPHEN 500 MG
1000 TABLET ORAL ONCE
Status: COMPLETED | OUTPATIENT
Start: 2017-08-05 | End: 2017-08-05

## 2017-08-05 RX ORDER — FAMOTIDINE 10 MG/ML
INJECTION, SOLUTION INTRAVENOUS AS NEEDED
Status: DISCONTINUED | OUTPATIENT
Start: 2017-08-05 | End: 2017-08-06 | Stop reason: SURG

## 2017-08-05 RX ORDER — CEFAZOLIN SODIUM 2 G/100ML
2 INJECTION, SOLUTION INTRAVENOUS ONCE
Status: DISCONTINUED | OUTPATIENT
Start: 2017-08-06 | End: 2017-08-05 | Stop reason: SDUPTHER

## 2017-08-05 RX ORDER — FAMOTIDINE 10 MG/ML
20 INJECTION, SOLUTION INTRAVENOUS ONCE AS NEEDED
Status: COMPLETED | OUTPATIENT
Start: 2017-08-05 | End: 2017-08-05

## 2017-08-05 RX ORDER — BUPIVACAINE HYDROCHLORIDE 7.5 MG/ML
INJECTION, SOLUTION EPIDURAL; RETROBULBAR AS NEEDED
Status: DISCONTINUED | OUTPATIENT
Start: 2017-08-05 | End: 2017-08-06 | Stop reason: SURG

## 2017-08-05 RX ORDER — ONDANSETRON 4 MG/1
4 TABLET, FILM COATED ORAL EVERY 6 HOURS PRN
Status: DISCONTINUED | OUTPATIENT
Start: 2017-08-05 | End: 2017-08-07

## 2017-08-05 RX ADMIN — CEFAZOLIN SODIUM 2 G: 2 INJECTION, SOLUTION INTRAVENOUS at 23:30

## 2017-08-05 RX ADMIN — MIDAZOLAM HYDROCHLORIDE 1 MG: 1 INJECTION, SOLUTION INTRAMUSCULAR; INTRAVENOUS at 23:40

## 2017-08-05 RX ADMIN — FENTANYL CITRATE 100 MCG: 50 INJECTION, SOLUTION INTRAMUSCULAR; INTRAVENOUS at 10:16

## 2017-08-05 RX ADMIN — FENTANYL CITRATE 100 MCG: 50 INJECTION, SOLUTION INTRAMUSCULAR; INTRAVENOUS at 17:42

## 2017-08-05 RX ADMIN — SODIUM CHLORIDE, POTASSIUM CHLORIDE, SODIUM LACTATE AND CALCIUM CHLORIDE 125 ML/HR: 600; 310; 30; 20 INJECTION, SOLUTION INTRAVENOUS at 06:40

## 2017-08-05 RX ADMIN — LIDOCAINE HYDROCHLORIDE,EPINEPHRINE BITARTRATE 7 ML: 20; .005 INJECTION, SOLUTION EPIDURAL; INFILTRATION; INTRACAUDAL; PERINEURAL at 21:56

## 2017-08-05 RX ADMIN — LIDOCAINE HYDROCHLORIDE AND EPINEPHRINE 3 ML: 15; 5 INJECTION, SOLUTION EPIDURAL at 10:10

## 2017-08-05 RX ADMIN — LIDOCAINE HYDROCHLORIDE AND EPINEPHRINE 2 ML: 15; 5 INJECTION, SOLUTION EPIDURAL at 10:12

## 2017-08-05 RX ADMIN — LIDOCAINE HYDROCHLORIDE,EPINEPHRINE BITARTRATE 5 ML: 20; .005 INJECTION, SOLUTION EPIDURAL; INFILTRATION; INTRACAUDAL; PERINEURAL at 15:40

## 2017-08-05 RX ADMIN — ACETAMINOPHEN 1000 MG: 500 TABLET ORAL at 22:37

## 2017-08-05 RX ADMIN — SODIUM CHLORIDE, POTASSIUM CHLORIDE, SODIUM LACTATE AND CALCIUM CHLORIDE: 600; 310; 30; 20 INJECTION, SOLUTION INTRAVENOUS at 23:39

## 2017-08-05 RX ADMIN — ROPIVACAINE HYDROCHLORIDE 11 ML: 5 INJECTION, SOLUTION EPIDURAL; INFILTRATION; PERINEURAL at 10:15

## 2017-08-05 RX ADMIN — BUTORPHANOL TARTRATE 1 MG: 2 INJECTION, SOLUTION INTRAMUSCULAR; INTRAVENOUS at 09:36

## 2017-08-05 RX ADMIN — ROPIVACAINE HYDROCHLORIDE 16 ML/HR: 5 INJECTION, SOLUTION EPIDURAL; INFILTRATION; PERINEURAL at 10:18

## 2017-08-05 RX ADMIN — FAMOTIDINE 20 MG: 10 INJECTION, SOLUTION INTRAVENOUS at 23:40

## 2017-08-05 RX ADMIN — LIDOCAINE HYDROCHLORIDE,EPINEPHRINE BITARTRATE 6 ML: 20; .005 INJECTION, SOLUTION EPIDURAL; INFILTRATION; INTRACAUDAL; PERINEURAL at 17:42

## 2017-08-05 RX ADMIN — ONDANSETRON 4 MG: 4 TABLET, FILM COATED ORAL at 15:46

## 2017-08-05 RX ADMIN — BUTORPHANOL TARTRATE 1 MG: 2 INJECTION, SOLUTION INTRAMUSCULAR; INTRAVENOUS at 07:24

## 2017-08-05 RX ADMIN — BUTORPHANOL TARTRATE 1 MG: 2 INJECTION, SOLUTION INTRAMUSCULAR; INTRAVENOUS at 05:07

## 2017-08-05 RX ADMIN — SODIUM CHLORIDE, POTASSIUM CHLORIDE, SODIUM LACTATE AND CALCIUM CHLORIDE 125 ML/HR: 600; 310; 30; 20 INJECTION, SOLUTION INTRAVENOUS at 12:24

## 2017-08-05 RX ADMIN — BUPIVACAINE HYDROCHLORIDE 1.3 ML: 7.5 INJECTION, SOLUTION EPIDURAL; RETROBULBAR at 23:50

## 2017-08-05 RX ADMIN — LIDOCAINE HYDROCHLORIDE,EPINEPHRINE BITARTRATE 6 ML: 20; .005 INJECTION, SOLUTION EPIDURAL; INFILTRATION; INTRACAUDAL; PERINEURAL at 19:44

## 2017-08-05 RX ADMIN — MORPHINE SULFATE 0.1 MG: 0.5 INJECTION, SOLUTION EPIDURAL; INTRATHECAL; INTRAVENOUS at 23:50

## 2017-08-05 RX ADMIN — FENTANYL CITRATE 15 MCG: 50 INJECTION, SOLUTION INTRAMUSCULAR; INTRAVENOUS at 23:50

## 2017-08-05 RX ADMIN — ROPIVACAINE HYDROCHLORIDE 17 ML/HR: 5 INJECTION, SOLUTION EPIDURAL; INFILTRATION; PERINEURAL at 17:46

## 2017-08-05 RX ADMIN — SODIUM CHLORIDE, POTASSIUM CHLORIDE, SODIUM LACTATE AND CALCIUM CHLORIDE 1000 ML: 600; 310; 30; 20 INJECTION, SOLUTION INTRAVENOUS at 09:43

## 2017-08-05 RX ADMIN — PENICILLIN G POTASSIUM 5 MILLION UNITS: 5000000 POWDER, FOR SOLUTION INTRAMUSCULAR; INTRAPLEURAL; INTRATHECAL; INTRAVENOUS at 22:35

## 2017-08-05 RX ADMIN — FAMOTIDINE 20 MG: 10 INJECTION, SOLUTION INTRAVENOUS at 13:55

## 2017-08-05 RX ADMIN — ONDANSETRON 4 MG: 2 INJECTION INTRAMUSCULAR; INTRAVENOUS at 23:41

## 2017-08-05 RX ADMIN — METOCLOPRAMIDE 10 MG: 5 INJECTION, SOLUTION INTRAMUSCULAR; INTRAVENOUS at 18:33

## 2017-08-05 RX ADMIN — ONDANSETRON 4 MG: 4 TABLET, FILM COATED ORAL at 09:34

## 2017-08-05 RX ADMIN — BUTORPHANOL TARTRATE 1 MG: 2 INJECTION, SOLUTION INTRAMUSCULAR; INTRAVENOUS at 07:48

## 2017-08-05 NOTE — PROGRESS NOTES
[facility]  Obstetric Progress Note    Subjective     Patient:    Resting without complaints    Objective     Vital Signs Range for the last 24 hours  Temp:  [97.7 °F (36.5 °C)-98.1 °F (36.7 °C)] 97.7 °F (36.5 °C)   Temp src: Oral   BP: (130-133)/(74-79) 130/79   Heart Rate:  [86-93] 93   Resp:  [16-18] 16               Weight:  [190 lb (86.2 kg)] 190 lb (86.2 kg)       Intake/Output this shift:         Physical Exam:      Abdomen Abdominal exam: soft, nontender, nondistended, no masses or organomegaly.   Extremities Exam of extremities: peripheral pulses normal, no pedal edema, no clubbing or cyanosis     Presentation: vertex   Cervix: Exam by: Method: sterile exam per RN   Dilation: Dilation: 3   Effacement: Cervical Effacement: 60-70%   Station: Station: -2         Fetal Heart Rate Assessment   Method: Fetal HR Assessment Method: external   Beats/min: Fetal HR (Beats/Min): 130   Baseline: Fetal HR Baseline: normal range (110-160 bpm)   Varibility: Fetal HR Variability: moderate (amplitude range 6 to 25 bpm)   Accels: Fetal HR Accelerations: greater than/equal to 15 bpm   Decels: Fetal HR Decelerations: absent   Tracing Category:       Uterine Assessment   Method: Method: TOCO (external toco transducer)   Frequency (min): Contraction Frequency (min): 6-7   Ctx Count in 10 min:     Duration: Contraction Duration (sec): 70-80   Intensity: Contraction Intensity: mild by palpation   Intensity by IUPC:     Resting Tone: Uterine Resting Tone: soft by palpation   Resting Tone by IUPC:     [place] Units:         Assessment/Plan     Active Problems:    Normal labor        Assessment:  1.  Intrauterine pregnancy at 40w2d weeks gestation with reactive fetal status.    2.  labor  without ROM  3.  Obstetrical history significant for is non-contributory.  4.  GBS status: No results found for: GBSANTIGEN    Plan:  1. Continue observation. Will start low dose pitocin  2.  Repeat SVE every 2-4 hours or prn  3.   Plan of care  has been reviewed with patient and she verbalizes understanding  4.  Risks, benefits of treatment plan have been discussed.  5.  All questions have been answered.  6.  Epidural as desires      Katie Andrew CNM  8/4/2017  10:48 PM

## 2017-08-05 NOTE — NURSING NOTE
1732- pt called out requesting to be checked for dilation d/t feeling constant pressure and pain. sve performed. Will notify anesthesia for redose

## 2017-08-05 NOTE — PLAN OF CARE
Problem: Labor (Cervical Ripen, Induct, Augment) (Adult,Obstetrics,Pediatric)  Goal: Signs and Symptoms of Listed Potential Problems Will be Absent or Manageable (Labor)  Outcome: Ongoing (interventions implemented as appropriate)    08/05/17 0848   Labor (Cervical Ripen, Induct, Augment)   Problems Assessed (Labor) all   Problems Present (Labor) none

## 2017-08-05 NOTE — PROGRESS NOTES
[facility]  Obstetric Progress Note    Subjective     Patient:    Resting without complaints.  Comfortable with epidural.     Objective     Vital Signs Range for the last 24 hours  Temp:  [97.7 °F (36.5 °C)-98.2 °F (36.8 °C)] 98.2 °F (36.8 °C)       BP: (113-148)/(62-92) 135/71   Heart Rate:  [] 106   Resp:  [16-18] 16                       Intake/Output this shift:         Physical Exam:      Abdomen Abdominal exam: soft, nontender, nondistended, no masses or organomegaly.   Extremities Exam of extremities: peripheral pulses normal, no pedal edema, no clubbing or cyanosis     Presentation: vertex   Cervix: Exam by: Method: sterile exam per RN   Dilation: Dilation: 6   Effacement: Cervical Effacement: 90%   Station: Station: 0         Fetal Heart Rate Assessment   Method: Fetal HR Assessment Method: external   Beats/min: Fetal HR (Beats/Min): 125   Baseline: Fetal HR Baseline: normal range (110-160 bpm)   Varibility: Fetal HR Variability: moderate (amplitude range 6 to 25 bpm)   Accels: Fetal HR Accelerations: greater than/equal to 15 bpm, lasting at least 15 seconds   Decels: Fetal HR Decelerations: absent   Tracing Category:       Uterine Assessment   Method: Method: TOCO (external toco transducer)   Frequency (min): Contraction Frequency (min): 2   Ctx Count in 10 min:     Duration: Contraction Duration (sec): 50-80   Intensity: Contraction Intensity: strong by palpation   Intensity by IUPC:     Resting Tone: Uterine Resting Tone: soft by palpation   Resting Tone by IUPC:     Bob Units: Bob Units: 170       Assessment/Plan     Active Problems:    Normal labor        Assessment:  1.  Intrauterine pregnancy at 40w3d weeks gestation with reactive fetal status.    2.  labor  with ROM  3.  Obstetrical history significant for is non-contributory.  4.  GBS status: No results found for: GBSANTIGEN    Plan:  1. Continue observation.  IUPC placed.  Titrate pitocin for adequate contractions.   2.   Repeat SVE every 2-4 hours or prn  3.   Plan of care has been reviewed with patient and she verbalizes understanding  4.  Risks, benefits of treatment plan have been discussed.  5.  All questions have been answered.        Katie Andrew CNM  8/5/2017  5:11 PM

## 2017-08-05 NOTE — PROGRESS NOTES
[facility]  Obstetric Progress Note    Subjective     Patient:    Resting without complaints.  Comfortable with epidural    Objective     Vital Signs Range for the last 24 hours  Temp:  [97.7 °F (36.5 °C)-98.2 °F (36.8 °C)] 97.9 °F (36.6 °C)   Temp src: Oral   BP: (113-146)/(65-92) 139/81   Heart Rate:  [] 100   Resp:  [16-18] 16               Weight:  [190 lb (86.2 kg)] 190 lb (86.2 kg)       Intake/Output this shift:         Physical Exam:      Abdomen Abdominal exam: soft, nontender, nondistended, no masses or organomegaly.   Extremities Exam of extremities: peripheral pulses normal, no pedal edema, no clubbing or cyanosis     Presentation: Vertex    Cervix: Exam by: Method: sterile exam per RN   Dilation: Dilation: 4   Effacement: Cervical Effacement: 80%   Station: Station: -1         Fetal Heart Rate Assessment   Method: Fetal HR Assessment Method: external   Beats/min: Fetal HR (Beats/Min): 120   Baseline: Fetal HR Baseline: normal range (110-160 bpm)   Varibility: Fetal HR Variability: moderate (amplitude range 6 to 25 bpm)   Accels: Fetal HR Accelerations: greater than/equal to 15 bpm, lasting at least 15 seconds   Decels: Fetal HR Decelerations: absent   Tracing Category:       Uterine Assessment   Method: Method: TOCO (external toco transducer)   Frequency (min): Contraction Frequency (min): 2-3   Ctx Count in 10 min:     Duration: Contraction Duration (sec): 50-60   Intensity: Contraction Intensity: strong by palpation   Intensity by IUPC:     Resting Tone: Uterine Resting Tone: soft by palpation   Resting Tone by IUPC:     [place] Units:         Assessment/Plan     Active Problems:    Normal labor        Assessment:  1.  Intrauterine pregnancy at 40w3d weeks gestation with reactive fetal status.    2.  labor  with ROM  3.  Obstetrical history significant for is non-contributory.  4.  GBS status: No results found for: GBSANTIGEN    Plan:  1. Continue observation  2.  Repeat SVE every 2-4 hours  or prn  3.   Plan of care has been reviewed with patient and she verbalizes understanding  4.  Risks, benefits of treatment plan have been discussed.  5.  All questions have been answered.        Katie Andrew CNM  8/5/2017  11:57 AM

## 2017-08-05 NOTE — NURSING NOTE
ELSA Andrew CNM called and gave an order for low dose Pitocin starting at 0000 until she comes and rounds on the pt in am. This RN vu.

## 2017-08-05 NOTE — ANESTHESIA PROCEDURE NOTES
Labor Epidural    Patient location during procedure: OB  Performed By  Anesthesiologist: RONNY PETERS  Preanesthetic Checklist  Completed: patient identified, surgical consent, pre-op evaluation, timeout performed, IV checked, risks and benefits discussed and monitors and equipment checked  Prep:  Pt Position:sitting  Sterile Tech:cap, gloves, mask and sterile barrier  Prep:DuraPrep  Monitoring:blood pressure monitoring  Epidural Block Procedure:  Approach:midline  Guidance:palpation technique  Location:L3-L4  Needle Type:Tuohy  Needle Gauge:17 G  Loss of Resistance Medium: air  Loss of Resistance: 5cm  Cath Depth at skin:12 cm  Paresthesia: none  Aspiration:negative  Test Dose:negative  Number of Attempts: 1  Post Assessment:  Dressing:occlusive dressing applied and secured with tape  Pt Tolerance:patient tolerated the procedure well with no apparent complications  Complications:no

## 2017-08-05 NOTE — ANESTHESIA PREPROCEDURE EVALUATION
Anesthesia Evaluation     Patient summary reviewed and Nursing notes reviewed   NPO Solid Status: > 6 hours  NPO Liquid Status: > 6 hours     Airway   Mallampati: II  TM distance: <3 FB  Neck ROM: full  possible difficult intubation  Dental      Pulmonary - negative pulmonary ROS   Cardiovascular - negative cardio ROS        Neuro/Psych- negative ROS  GI/Hepatic/Renal/Endo - negative ROS     Musculoskeletal (-) negative ROS    Abdominal    Substance History - negative use     OB/GYN          Other - negative ROS                                       Anesthesia Plan    ASA 3     epidural     Anesthetic plan and risks discussed with patient and spouse/significant other.

## 2017-08-06 PROCEDURE — 25010000002 ONDANSETRON PER 1 MG: Performed by: ANESTHESIOLOGY

## 2017-08-06 PROCEDURE — 25010000002 ONDANSETRON PER 1 MG: Performed by: OBSTETRICS & GYNECOLOGY

## 2017-08-06 PROCEDURE — 59025 FETAL NON-STRESS TEST: CPT | Performed by: ADVANCED PRACTICE MIDWIFE

## 2017-08-06 PROCEDURE — 25010000002 MORPHINE SULFATE (PF) 2 MG/ML SOLUTION: Performed by: ANESTHESIOLOGY

## 2017-08-06 PROCEDURE — 59514 CESAREAN DELIVERY ONLY: CPT | Performed by: OBSTETRICS & GYNECOLOGY

## 2017-08-06 PROCEDURE — 25010000003 AMPICILLIN-SULBACTAM PER 1.5 G: Performed by: OBSTETRICS & GYNECOLOGY

## 2017-08-06 PROCEDURE — 88307 TISSUE EXAM BY PATHOLOGIST: CPT | Performed by: OBSTETRICS & GYNECOLOGY

## 2017-08-06 PROCEDURE — 25010000003 CEFAZOLIN IN DEXTROSE 2-4 GM/100ML-% SOLUTION: Performed by: OBSTETRICS & GYNECOLOGY

## 2017-08-06 PROCEDURE — 25010000002 MIDAZOLAM PER 1 MG: Performed by: ANESTHESIOLOGY

## 2017-08-06 PROCEDURE — 63710000001 DIPHENHYDRAMINE PER 50 MG: Performed by: OBSTETRICS & GYNECOLOGY

## 2017-08-06 PROCEDURE — 25010000002 GENTAMICIN PER 80 MG: Performed by: OBSTETRICS & GYNECOLOGY

## 2017-08-06 RX ORDER — LANOLIN 100 %
OINTMENT (GRAM) TOPICAL AS NEEDED
Status: DISCONTINUED | OUTPATIENT
Start: 2017-08-06 | End: 2017-08-09 | Stop reason: HOSPADM

## 2017-08-06 RX ORDER — IBUPROFEN 600 MG/1
600 TABLET ORAL EVERY 6 HOURS PRN
Status: DISCONTINUED | OUTPATIENT
Start: 2017-08-06 | End: 2017-08-09 | Stop reason: HOSPADM

## 2017-08-06 RX ORDER — ONDANSETRON 2 MG/ML
4 INJECTION INTRAMUSCULAR; INTRAVENOUS ONCE AS NEEDED
Status: COMPLETED | OUTPATIENT
Start: 2017-08-06 | End: 2017-08-06

## 2017-08-06 RX ORDER — OXYTOCIN/RINGER'S LACTATE 20/1000 ML
1000 PLASTIC BAG, INJECTION (ML) INTRAVENOUS CONTINUOUS
Status: ACTIVE | OUTPATIENT
Start: 2017-08-06 | End: 2017-08-06

## 2017-08-06 RX ORDER — MORPHINE SULFATE 2 MG/ML
2 INJECTION, SOLUTION INTRAMUSCULAR; INTRAVENOUS
Status: DISPENSED | OUTPATIENT
Start: 2017-08-06 | End: 2017-08-07

## 2017-08-06 RX ORDER — DIPHENHYDRAMINE HCL 25 MG
25 CAPSULE ORAL EVERY 4 HOURS PRN
Status: DISCONTINUED | OUTPATIENT
Start: 2017-08-06 | End: 2017-08-06 | Stop reason: HOSPADM

## 2017-08-06 RX ORDER — NALOXONE HCL 0.4 MG/ML
0.4 VIAL (ML) INJECTION
Status: DISCONTINUED | OUTPATIENT
Start: 2017-08-06 | End: 2017-08-06 | Stop reason: HOSPADM

## 2017-08-06 RX ORDER — ACETAMINOPHEN 325 MG/1
650 TABLET ORAL ONCE AS NEEDED
Status: DISCONTINUED | OUTPATIENT
Start: 2017-08-06 | End: 2017-08-06 | Stop reason: HOSPADM

## 2017-08-06 RX ORDER — OXYTOCIN/RINGER'S LACTATE 20/1000 ML
999 PLASTIC BAG, INJECTION (ML) INTRAVENOUS ONCE
Status: DISCONTINUED | OUTPATIENT
Start: 2017-08-06 | End: 2017-08-06 | Stop reason: HOSPADM

## 2017-08-06 RX ORDER — MISOPROSTOL 200 UG/1
800 TABLET ORAL AS NEEDED
Status: DISCONTINUED | OUTPATIENT
Start: 2017-08-06 | End: 2017-08-06 | Stop reason: HOSPADM

## 2017-08-06 RX ORDER — DIPHENHYDRAMINE HYDROCHLORIDE 50 MG/ML
25 INJECTION INTRAMUSCULAR; INTRAVENOUS EVERY 4 HOURS PRN
Status: DISCONTINUED | OUTPATIENT
Start: 2017-08-06 | End: 2017-08-06 | Stop reason: HOSPADM

## 2017-08-06 RX ORDER — METOCLOPRAMIDE HYDROCHLORIDE 5 MG/ML
10 INJECTION INTRAMUSCULAR; INTRAVENOUS ONCE AS NEEDED
Status: DISCONTINUED | OUTPATIENT
Start: 2017-08-06 | End: 2017-08-06 | Stop reason: HOSPADM

## 2017-08-06 RX ORDER — ONDANSETRON 2 MG/ML
4 INJECTION INTRAMUSCULAR; INTRAVENOUS ONCE
Status: DISCONTINUED | OUTPATIENT
Start: 2017-08-06 | End: 2017-08-06 | Stop reason: HOSPADM

## 2017-08-06 RX ORDER — IBUPROFEN 600 MG/1
600 TABLET ORAL ONCE AS NEEDED
Status: DISCONTINUED | OUTPATIENT
Start: 2017-08-06 | End: 2017-08-06 | Stop reason: HOSPADM

## 2017-08-06 RX ORDER — DIPHENHYDRAMINE HCL 25 MG
25 CAPSULE ORAL EVERY 6 HOURS PRN
Status: DISCONTINUED | OUTPATIENT
Start: 2017-08-06 | End: 2017-08-09 | Stop reason: HOSPADM

## 2017-08-06 RX ORDER — GENTAMICIN SULFATE 80 MG/100ML
1.5 INJECTION, SOLUTION INTRAVENOUS EVERY 8 HOURS SCHEDULED
Status: DISCONTINUED | OUTPATIENT
Start: 2017-08-06 | End: 2017-08-07

## 2017-08-06 RX ORDER — OXYTOCIN/RINGER'S LACTATE 20/1000 ML
125 PLASTIC BAG, INJECTION (ML) INTRAVENOUS CONTINUOUS PRN
Status: DISCONTINUED | OUTPATIENT
Start: 2017-08-06 | End: 2017-08-06 | Stop reason: HOSPADM

## 2017-08-06 RX ORDER — HYDROMORPHONE HYDROCHLORIDE 1 MG/ML
0.5 INJECTION, SOLUTION INTRAMUSCULAR; INTRAVENOUS; SUBCUTANEOUS
Status: DISCONTINUED | OUTPATIENT
Start: 2017-08-06 | End: 2017-08-06 | Stop reason: HOSPADM

## 2017-08-06 RX ORDER — OXYCODONE HYDROCHLORIDE AND ACETAMINOPHEN 5; 325 MG/1; MG/1
1 TABLET ORAL EVERY 4 HOURS PRN
Status: DISCONTINUED | OUTPATIENT
Start: 2017-08-06 | End: 2017-08-09 | Stop reason: HOSPADM

## 2017-08-06 RX ORDER — GENTAMICIN SULFATE 100 MG/100ML
2 INJECTION, SOLUTION INTRAVENOUS ONCE
Status: COMPLETED | OUTPATIENT
Start: 2017-08-06 | End: 2017-08-06

## 2017-08-06 RX ORDER — OXYTOCIN 10 [USP'U]/ML
INJECTION, SOLUTION INTRAMUSCULAR; INTRAVENOUS AS NEEDED
Status: DISCONTINUED | OUTPATIENT
Start: 2017-08-06 | End: 2017-08-06 | Stop reason: SURG

## 2017-08-06 RX ORDER — CARBOPROST TROMETHAMINE 250 UG/ML
250 INJECTION, SOLUTION INTRAMUSCULAR AS NEEDED
Status: DISCONTINUED | OUTPATIENT
Start: 2017-08-06 | End: 2017-08-06 | Stop reason: HOSPADM

## 2017-08-06 RX ORDER — MORPHINE SULFATE 4 MG/ML
2 INJECTION, SOLUTION INTRAMUSCULAR; INTRAVENOUS
Status: DISCONTINUED | OUTPATIENT
Start: 2017-08-06 | End: 2017-08-06 | Stop reason: HOSPADM

## 2017-08-06 RX ORDER — OXYCODONE HYDROCHLORIDE AND ACETAMINOPHEN 5; 325 MG/1; MG/1
2 TABLET ORAL ONCE AS NEEDED
Status: DISCONTINUED | OUTPATIENT
Start: 2017-08-06 | End: 2017-08-06 | Stop reason: HOSPADM

## 2017-08-06 RX ORDER — ACETAMINOPHEN 325 MG/1
650 TABLET ORAL EVERY 4 HOURS PRN
Status: DISCONTINUED | OUTPATIENT
Start: 2017-08-06 | End: 2017-08-09 | Stop reason: HOSPADM

## 2017-08-06 RX ORDER — METHYLERGONOVINE MALEATE 0.2 MG/ML
200 INJECTION INTRAVENOUS ONCE AS NEEDED
Status: DISCONTINUED | OUTPATIENT
Start: 2017-08-06 | End: 2017-08-06 | Stop reason: HOSPADM

## 2017-08-06 RX ORDER — ONDANSETRON 2 MG/ML
4 INJECTION INTRAMUSCULAR; INTRAVENOUS EVERY 6 HOURS PRN
Status: DISCONTINUED | OUTPATIENT
Start: 2017-08-06 | End: 2017-08-09 | Stop reason: HOSPADM

## 2017-08-06 RX ORDER — OXYCODONE AND ACETAMINOPHEN 10; 325 MG/1; MG/1
1 TABLET ORAL EVERY 4 HOURS PRN
Status: DISCONTINUED | OUTPATIENT
Start: 2017-08-06 | End: 2017-08-09 | Stop reason: HOSPADM

## 2017-08-06 RX ORDER — DIPHENHYDRAMINE HYDROCHLORIDE 50 MG/ML
12.5 INJECTION INTRAMUSCULAR; INTRAVENOUS EVERY 6 HOURS PRN
Status: DISCONTINUED | OUTPATIENT
Start: 2017-08-06 | End: 2017-08-09 | Stop reason: HOSPADM

## 2017-08-06 RX ADMIN — OXYCODONE AND ACETAMINOPHEN 1 TABLET: 5; 325 TABLET ORAL at 17:23

## 2017-08-06 RX ADMIN — SODIUM CHLORIDE, POTASSIUM CHLORIDE, SODIUM LACTATE AND CALCIUM CHLORIDE: 600; 310; 30; 20 INJECTION, SOLUTION INTRAVENOUS at 00:32

## 2017-08-06 RX ADMIN — OXYCODONE AND ACETAMINOPHEN 1 TABLET: 5; 325 TABLET ORAL at 06:56

## 2017-08-06 RX ADMIN — MORPHINE SULFATE 2 MG: 2 INJECTION, SOLUTION INTRAMUSCULAR; INTRAVENOUS at 03:04

## 2017-08-06 RX ADMIN — ONDANSETRON 4 MG: 4 TABLET, FILM COATED ORAL at 22:38

## 2017-08-06 RX ADMIN — OXYCODONE AND ACETAMINOPHEN 1 TABLET: 5; 325 TABLET ORAL at 22:35

## 2017-08-06 RX ADMIN — AMPICILLIN SODIUM AND SULBACTAM SODIUM 3 G: 2; 1 INJECTION, POWDER, FOR SOLUTION INTRAMUSCULAR; INTRAVENOUS at 12:22

## 2017-08-06 RX ADMIN — MIDAZOLAM HYDROCHLORIDE 1 MG: 1 INJECTION, SOLUTION INTRAMUSCULAR; INTRAVENOUS at 00:19

## 2017-08-06 RX ADMIN — OXYTOCIN 30 UNITS: 10 INJECTION, SOLUTION INTRAMUSCULAR; INTRAVENOUS at 00:08

## 2017-08-06 RX ADMIN — ONDANSETRON 4 MG: 2 INJECTION INTRAMUSCULAR; INTRAVENOUS at 03:00

## 2017-08-06 RX ADMIN — ONDANSETRON 4 MG: 2 INJECTION INTRAMUSCULAR; INTRAVENOUS at 08:26

## 2017-08-06 RX ADMIN — GENTAMICIN SULFATE 80 MG: 80 INJECTION, SOLUTION INTRAVENOUS at 09:37

## 2017-08-06 RX ADMIN — DIPHENHYDRAMINE HYDROCHLORIDE 25 MG: 25 CAPSULE ORAL at 10:14

## 2017-08-06 RX ADMIN — IBUPROFEN 600 MG: 600 TABLET, FILM COATED ORAL at 17:23

## 2017-08-06 RX ADMIN — AMPICILLIN SODIUM AND SULBACTAM SODIUM 3 G: 2; 1 INJECTION, POWDER, FOR SOLUTION INTRAMUSCULAR; INTRAVENOUS at 18:36

## 2017-08-06 RX ADMIN — OXYCODONE AND ACETAMINOPHEN 1 TABLET: 5; 325 TABLET ORAL at 12:41

## 2017-08-06 RX ADMIN — IBUPROFEN 600 MG: 600 TABLET, FILM COATED ORAL at 10:15

## 2017-08-06 RX ADMIN — SODIUM CHLORIDE, POTASSIUM CHLORIDE, SODIUM LACTATE AND CALCIUM CHLORIDE: 600; 310; 30; 20 INJECTION, SOLUTION INTRAVENOUS at 00:08

## 2017-08-06 RX ADMIN — AMPICILLIN SODIUM AND SULBACTAM SODIUM 3 G: 2; 1 INJECTION, POWDER, FOR SOLUTION INTRAMUSCULAR; INTRAVENOUS at 01:47

## 2017-08-06 RX ADMIN — OXYTOCIN 30 UNITS: 10 INJECTION, SOLUTION INTRAMUSCULAR; INTRAVENOUS at 00:32

## 2017-08-06 RX ADMIN — GENTAMICIN SULFATE 100 MG: 100 INJECTION, SOLUTION INTRAVENOUS at 01:05

## 2017-08-06 RX ADMIN — GENTAMICIN SULFATE 80 MG: 80 INJECTION, SOLUTION INTRAVENOUS at 17:23

## 2017-08-06 RX ADMIN — AMPICILLIN SODIUM AND SULBACTAM SODIUM 3 G: 2; 1 INJECTION, POWDER, FOR SOLUTION INTRAMUSCULAR; INTRAVENOUS at 06:55

## 2017-08-06 RX ADMIN — Medication: at 05:27

## 2017-08-06 NOTE — LACTATION NOTE
08/06/17 1150   Maternal Infant Feeding   Previous Breastfeeding History no   Current Delivery Breastfeeding History   Current Breastfeeding History yes   Current Breastfeeding Success successful  (reports doing well, offered services)   Equipment Type/Education   Breast Pump Type double electric, personal

## 2017-08-06 NOTE — ANESTHESIA POSTPROCEDURE EVALUATION
Patient: Georgie Winston    Procedure Summary     Date Anesthesia Start Anesthesia Stop Room / Location    17 1002  [facility]       Procedure Diagnosis Surgeon Provider     SECTION PRIMARY (N/A Abdomen) No diagnosis on file. MD Rocío Eaton,           Anesthesia Type: epidural  Last vitals  /73       Temp     98.4   Pulse    113   Resp 20       SpO2     95%     Post Anesthesia Care and Evaluation    Patient location during evaluation: bedside  Patient participation: complete - patient participated  Level of consciousness: awake  Pain score: 0  Pain management: satisfactory to patient  Airway patency: patent  Anesthetic complications: No anesthetic complications    Cardiovascular status: acceptable  Respiratory status: acceptable  Hydration status: acceptable

## 2017-08-06 NOTE — ANESTHESIA PROCEDURE NOTES
Spinal Block    Patient location during procedure: OB  Indication:procedure for pain  Performed By  Anesthesiologist: RONNY PETERS  Preanesthetic Checklist  Completed: patient identified, surgical consent, pre-op evaluation, timeout performed, IV checked, risks and benefits discussed and monitors and equipment checked  Spinal Block Prep:  Patient Position:sitting  Sterile Tech:cap, gloves, mask and sterile barriers  Prep:DuraPrep  Patient Monitoring:blood pressure monitoring and EKG  Spinal Block Procedure  Approach:midline  Guidance:palpation technique  Location:L3-L4  Needle Type:Juanis  Needle Gauge:25 G  Placement of Spinal needle event:cerebrospinal fluid aspirated  Paresthesia: no  Fluid Appearance:clear  Post Assessment  Patient Tolerance:patient tolerated the procedure well with no apparent complications  Complications no

## 2017-08-06 NOTE — OP NOTE
Op Note    Preoperative diagnosis  1)IUP 40 4/7 weeks  2)failure to progress (arrest of dilatation)  3)chorioamnionitis  Operative diagnosis  1)same plus occiput posterior presentation    Procedure  1) primary  (LTUI)    Surgeon  1)Antione Vazquez M.D.    Indications  Pt presented in spont labor.  Slowly progressed to maximum of 7cm with no further progression in spite of adequate contractions per IUPC.  Near end of period pt developed fever of 100.5 axillary.  diag of failure to progress was made and  Pt recommended for .  Risks and benefits explained in detail and pt and  agreed with  delivery    Intraoperative findings  1) 7 lb 6 oz male , apgars 8 and 9  2)OP presentation    Operative procedure  The patient was taken back to the operative suite and placed in the dorsal supine position after spinal anesthesia was placed.  Pt was then sterilely prepped and draped and indwelling siddiqui catheter placed.  A pfannenstiel-like skin incision was made and carried to the rectus fascia.  The fascia was incised and extended laterally in both directions.  The fascia was then undermined superiorly and inferiorly.  The rectus muscles were  in the midline exposing the peritoneum, which was entered sharply in a clear area.  The peritoneal incision was extended taking care not to enter the bladder.  The vesico-uterine-peritoneal reflection was then nicked and extended in both directions.  A small bladder flap was then developed and the bladder blade was placed between the uterus and bladder for protection.  The uterus was then nicked in the midline and low-transverse uterine incision was completed using blunt dissection.  The infant was then delivered onto the operative field and bulb suctioned.  The umbilical cord was milked 3-4 times.  The cord was then clamped and cut and passed off to the delivery team.  A segment of cord was obtained for cord blood ABG's.  Cord blood was obtained and the  placenta was manually extracted.  The uterus was then externalized and cleaned with wet laps until clean.  The uterine incision was then closed in one layer using number 1 monocryl.  Once good hemostasis was achieved, the posterior cul-de-sac was irrigated with saline and suctioned free of clots and debris.  The uterine incision and bladder flap were also irrigated.  Once good hemostasis was confirmed, the uterus was placed back into the peritoneal cavity.  Both gutters were then irrigated with saline and suctioned free of clots and debris.  One final inspection was made of the uterine incision and bladder flap area showing good hemostasis.  Next, the peritoneum was closed with 2-0 vicryl.  The subfascial area was then inspected and when hemostatic, the fascia was closed with zero vicryl in a running, non-locking stitch.  Next, the subcutaneous tissue was irrigated and when hemostatic the subcutaneous fat was reaproximated with 3-0 plain gut.  Finally the skin was closed with 2-0 prolene in a running subcuticular stitch.  Skin glue was placed as a sealant.  The vagina was cleared of blood and clots and the patient was then taken to the recovery room in good condition.    EBL: 1000ml    Drains: siddiqui to strait drain    Complications: none    Specimens: Cord blood, placenta        Antione Vazquez M.D

## 2017-08-06 NOTE — PROGRESS NOTES
Pharmacokinetic Consult - Gentamidin Dosing  Georgie Winston is a 27 y.o. female who has been consulted for gentamicindosing for fever.    Relevant clinical data and objective history reviewed:  Creatinine   Date Value Ref Range Status   08/04/2017 0.50 (L) 0.60 - 1.30 mg/dL Final     Estimated Creatinine Clearance: 175.8 mL/min (by C-G formula based on Cr of 0.5).  I/O last 3 completed shifts:  In: 2000 [I.V.:2000]  Out: 1400 [Urine:1400]  Patient weight: 190 lb (86.2 kg)   Ideal body weight: 52.4 kg    Asessment/Plan  Will initiate dose at 100 mg (2 mg/kg ideal body weight) IV once       followed by  Gentamicin 80mg (1.5 mg/kg ideal body weight) IV q 8 hours    Pharmacy will order gentamicin level in 3 days if patient remains on gentamicin therapy after that time frame.    Tom Flores, [facility]  8/6/2017  12:00 AM

## 2017-08-06 NOTE — H&P
Georgie Annel Winston  [id number]  1990      Updated H and P  Pt presented in spont labor.  Has slowly progressed to 7cm with no further progression in spite of oxytocin, changing maternal position.  Contractions adequate per IUPC.  Pt with development of fever recently.    No interval change in past medical history, social history, family history, or physical exam.    A/1)IUP 40 3/7 weeks       2)failure to progress (arrest of dilatation)      3)chorioamnionitis  P/1)      2)Unasyn and Gent post operatively (discussed with pt and )      Antione Vazquez MD  2017  11:27 PM

## 2017-08-06 NOTE — LACTATION NOTE
This note was copied from a baby's chart.     08/06/17 1320   Maternal Infant Assessment   Size Issue, Right Breast no   Nipple, Right everted   Nipple Conditions, Right intact   Infant Assessment   Sucking Reflex present   Rooting Reflex present   Swallow Reflex present   LATCH Score   Latch 2-->grasps breast, tongue down, lips flanged, rhythmic sucking   Audible Swallowing 1-->a few with stimulation   Type Of Nipple 2-->everted (after stimulation)   Comfort (Breast/Nipple) 2-->soft/nontender   Hold (Positioning) 1-->minimal assist, teach one side: mother does other, staff holds   Score (less than 7 for 2/more consecutive times, consult Lactation Consultant) 8   Maternal Infant Feeding   Latch Assistance yes   Feeding Infant   Feeding Readiness Cues rooting;eager;hand to mouth movements   Effective Latch During Feeding yes   Audible Swallow yes   Suck/Swallow Coordination present   Skin-to-Skin Contact During Feeding yes

## 2017-08-06 NOTE — PROGRESS NOTES
[place]   PROGRESS NOTE      Subjective     Patient reports:   Doing well, pain controlled with medication, has not been out of bed yet. Alvarado currently in place.   Objective      Vitals: Vital Signs Range for the last 24 hours  Temperature: Temp:  [98 °F (36.7 °C)-100.5 °F (38.1 °C)] 98.7 °F (37.1 °C)   Temp Source: Temp src: Oral   BP: BP: (113-154)/(59-95) 129/74   Pulse: Heart Rate:  [] 100   Respirations: Resp:  [16-20] 18   SPO2: SpO2:  [95 %-98 %] 96 %   O2 Amount (l/min):     O2 Devices            Physical Exam    Lungs clear to auscultation bilaterally   Abdomen Soft, non-tender, normal bowel sounds; no bruits, organomegaly or masses.   Incision  healing well, no drainage, no erythema, no hernia, no swelling, well approximated   Extremities edema 2+ and Homans sign is negative, no sign of DVT     LABS:  Lab Results   Component Value Date    WBC 8.51 2017    HGB 11.1 (L) 2017    HCT 33.1 (L) 2017    MCV 95.9 2017     2017       Assessment/Plan      S/p primary c section for failure to progress  chorioamnionitis    Assessment:    Georgie Winston is Day 0  post-partum        Plan:  remove urine catheter, continue post op care and continue antibitoics for until afebrile for 24 hours.        Tamica Disla CNM  2017  11:04 AM

## 2017-08-06 NOTE — PLAN OF CARE
Problem: Patient Care Overview (Adult)  Goal: Plan of Care Review  Outcome: Ongoing (interventions implemented as appropriate)    17 0550   Coping/Psychosocial Response Interventions   Plan Of Care Reviewed With patient   Patient Care Overview   Progress improving   Outcome Evaluation   Outcome Summary/Follow up Plan VSS, lochia WDL, good urine output       Goal: Adult Individualization and Mutuality  Outcome: Ongoing (interventions implemented as appropriate)  Goal: Discharge Needs Assessment  Outcome: Ongoing (interventions implemented as appropriate)    Problem: Breastfeeding (Adult,NICU,,Obstetrics,Pediatric)  Goal: Signs and Symptoms of Listed Potential Problems Will be Absent or Manageable (Breastfeeding)  Outcome: Ongoing (interventions implemented as appropriate)    Problem: Postpartum ( Delivery) (Adult)  Goal: Signs and Symptoms of Listed Potential Problems Will be Absent or Manageable (Postpartum)  Outcome: Ongoing (interventions implemented as appropriate)

## 2017-08-06 NOTE — PROGRESS NOTES
[facility]  Obstetric Progress Note    Subjective     Patient:    Resting without complaints.  Now more comfortable after epidural bolus.      Objective     Vital Signs Range for the last 24 hours  Temp:  [97.8 °F (36.6 °C)-100.5 °F (38.1 °C)] 100.5 °F (38.1 °C)   Temp src: Axillary   BP: (113-154)/(59-92) 134/63   Heart Rate:  [] 116   Resp:  [16-20] 20                       Intake/Output this shift:         Physical Exam:      Abdomen Abdominal exam: soft, nontender, nondistended, no masses or organomegaly.   Extremities Exam of extremities: peripheral pulses normal, no pedal edema, no clubbing or cyanosis     Presentation: vertex   Cervix: Exam by: Method: sterile exam per RN   Dilation: 7.5   Effacement: 80%   Station: -1         Fetal Heart Rate Assessment   Method: Fetal HR Assessment Method: external   Beats/min: Fetal HR (Beats/Min): 140   Baseline: Fetal HR Baseline: normal range (110-160 bpm)   Varibility: Fetal HR Variability: moderate (amplitude range 6 to 25 bpm)   Accels: Fetal HR Accelerations: greater than/equal to 15 bpm, lasting at least 15 seconds   Decels: Fetal HR Decelerations: variable, prolonged   Tracing Category:       Uterine Assessment   Method: Method: IUPC (intrauterine pressure catheter)   Frequency (min): Contraction Frequency (min): 1-3   Ctx Count in 10 min:     Duration: Contraction Duration (sec):    Intensity: Contraction Intensity: strong by palpation   Intensity by IUPC: Contraction Intensity (mm Hg) by IUPC: 80   Resting Tone: Uterine Resting Tone: soft by palpation   Resting Tone by IUPC: Uterine Resting Tone (mmHg) By IUPC: 20   [place] Units: [place] Units: 210       Assessment/Plan     Active Problems:    Normal labor        Assessment:  1.  Intrauterine pregnancy at 40w3d weeks gestation with reactive fetal status.    2.  labor  with ROM, slow cervical change without descent of fetal presenting part  3. Fever  4.  Obstetrical history significant for is  non-contributory.  5.  GBS status: No results found for: GBSANTIGEN    Plan:  1. Continue observation for one hour and repeat SVE  2.  Start PCN and Tylenol for fever  3.   Plan of care has been reviewed with patient and she verbalizes understanding  4.  Risks, benefits of treatment plan have been discussed.  5.  All questions have been answered.        Katie Andrew CNM  8/5/2017  10:25 PM

## 2017-08-07 LAB
BASOPHILS # BLD AUTO: 0.02 10*3/MM3 (ref 0–0.2)
BASOPHILS NFR BLD AUTO: 0.2 % (ref 0–1)
DEPRECATED RDW RBC AUTO: 51.9 FL (ref 37–54)
EOSINOPHIL # BLD AUTO: 0.07 10*3/MM3 (ref 0–0.3)
EOSINOPHIL NFR BLD AUTO: 0.6 % (ref 0–3)
ERYTHROCYTE [DISTWIDTH] IN BLOOD BY AUTOMATED COUNT: 14.9 % (ref 11.3–14.5)
GLUCOSE BLDC GLUCOMTR-MCNC: 112 MG/DL (ref 70–130)
HCT VFR BLD AUTO: 22.3 % (ref 34.5–44)
HGB BLD-MCNC: 7.3 G/DL (ref 11.5–15.5)
IMM GRANULOCYTES # BLD: 0.17 10*3/MM3 (ref 0–0.03)
IMM GRANULOCYTES NFR BLD: 1.5 % (ref 0–0.6)
LYMPHOCYTES # BLD AUTO: 2.12 10*3/MM3 (ref 0.6–4.8)
LYMPHOCYTES NFR BLD AUTO: 19.1 % (ref 24–44)
MCH RBC QN AUTO: 31.2 PG (ref 27–31)
MCHC RBC AUTO-ENTMCNC: 32.7 G/DL (ref 32–36)
MCV RBC AUTO: 95.3 FL (ref 80–99)
MONOCYTES # BLD AUTO: 0.66 10*3/MM3 (ref 0–1)
MONOCYTES NFR BLD AUTO: 5.9 % (ref 0–12)
NEUTROPHILS # BLD AUTO: 8.06 10*3/MM3 (ref 1.5–8.3)
NEUTROPHILS NFR BLD AUTO: 72.7 % (ref 41–71)
PLATELET # BLD AUTO: 148 10*3/MM3 (ref 150–450)
PMV BLD AUTO: 9.2 FL (ref 6–12)
RBC # BLD AUTO: 2.34 10*6/MM3 (ref 3.89–5.14)
WBC NRBC COR # BLD: 11.1 10*3/MM3 (ref 3.5–10.8)

## 2017-08-07 PROCEDURE — 25010000002 GENTAMICIN PER 80 MG: Performed by: OBSTETRICS & GYNECOLOGY

## 2017-08-07 PROCEDURE — 25010000003 AMPICILLIN-SULBACTAM PER 1.5 G: Performed by: OBSTETRICS & GYNECOLOGY

## 2017-08-07 PROCEDURE — 25010000002 ONDANSETRON PER 1 MG: Performed by: OBSTETRICS & GYNECOLOGY

## 2017-08-07 PROCEDURE — 85025 COMPLETE CBC W/AUTO DIFF WBC: CPT | Performed by: OBSTETRICS & GYNECOLOGY

## 2017-08-07 PROCEDURE — 82962 GLUCOSE BLOOD TEST: CPT

## 2017-08-07 RX ORDER — FAMOTIDINE 20 MG/1
20 TABLET, FILM COATED ORAL 2 TIMES DAILY
Status: DISCONTINUED | OUTPATIENT
Start: 2017-08-07 | End: 2017-08-09 | Stop reason: HOSPADM

## 2017-08-07 RX ORDER — PANTOPRAZOLE SODIUM 40 MG/1
40 TABLET, DELAYED RELEASE ORAL
Status: DISCONTINUED | OUTPATIENT
Start: 2017-08-07 | End: 2017-08-09 | Stop reason: HOSPADM

## 2017-08-07 RX ORDER — FERROUS SULFATE 325(65) MG
325 TABLET ORAL 2 TIMES DAILY WITH MEALS
Status: DISCONTINUED | OUTPATIENT
Start: 2017-08-07 | End: 2017-08-09 | Stop reason: HOSPADM

## 2017-08-07 RX ORDER — CETIRIZINE HYDROCHLORIDE 10 MG/1
10 TABLET ORAL DAILY
Status: DISCONTINUED | OUTPATIENT
Start: 2017-08-07 | End: 2017-08-09 | Stop reason: HOSPADM

## 2017-08-07 RX ORDER — FLUTICASONE PROPIONATE 50 MCG
2 SPRAY, SUSPENSION (ML) NASAL DAILY
Status: DISCONTINUED | OUTPATIENT
Start: 2017-08-07 | End: 2017-08-09 | Stop reason: HOSPADM

## 2017-08-07 RX ORDER — PRENATAL VIT/IRON FUM/FOLIC AC 27MG-0.8MG
1 TABLET ORAL DAILY
Status: DISCONTINUED | OUTPATIENT
Start: 2017-08-07 | End: 2017-08-09 | Stop reason: HOSPADM

## 2017-08-07 RX ADMIN — OXYCODONE AND ACETAMINOPHEN 1 TABLET: 5; 325 TABLET ORAL at 13:44

## 2017-08-07 RX ADMIN — NYSTATIN 500000 UNITS: 100000 SUSPENSION ORAL at 22:30

## 2017-08-07 RX ADMIN — IBUPROFEN 600 MG: 600 TABLET, FILM COATED ORAL at 08:51

## 2017-08-07 RX ADMIN — Medication 325 MG: at 17:38

## 2017-08-07 RX ADMIN — OXYCODONE AND ACETAMINOPHEN 1 TABLET: 5; 325 TABLET ORAL at 17:38

## 2017-08-07 RX ADMIN — AMPICILLIN SODIUM AND SULBACTAM SODIUM 3 G: 2; 1 INJECTION, POWDER, FOR SOLUTION INTRAMUSCULAR; INTRAVENOUS at 00:03

## 2017-08-07 RX ADMIN — AMPICILLIN SODIUM AND SULBACTAM SODIUM 3 G: 2; 1 INJECTION, POWDER, FOR SOLUTION INTRAMUSCULAR; INTRAVENOUS at 06:00

## 2017-08-07 RX ADMIN — ONDANSETRON 4 MG: 2 INJECTION INTRAMUSCULAR; INTRAVENOUS at 08:54

## 2017-08-07 RX ADMIN — AMPICILLIN SODIUM AND SULBACTAM SODIUM 3 G: 2; 1 INJECTION, POWDER, FOR SOLUTION INTRAMUSCULAR; INTRAVENOUS at 12:34

## 2017-08-07 RX ADMIN — IBUPROFEN 600 MG: 600 TABLET, FILM COATED ORAL at 17:38

## 2017-08-07 RX ADMIN — OXYCODONE HYDROCHLORIDE AND ACETAMINOPHEN 1 TABLET: 10; 325 TABLET ORAL at 02:22

## 2017-08-07 RX ADMIN — Medication 325 MG: at 10:43

## 2017-08-07 RX ADMIN — OXYCODONE AND ACETAMINOPHEN 1 TABLET: 5; 325 TABLET ORAL at 08:51

## 2017-08-07 RX ADMIN — PANTOPRAZOLE SODIUM 40 MG: 40 TABLET, DELAYED RELEASE ORAL at 10:43

## 2017-08-07 RX ADMIN — GENTAMICIN SULFATE 80 MG: 80 INJECTION, SOLUTION INTRAVENOUS at 02:22

## 2017-08-07 RX ADMIN — GENTAMICIN SULFATE 80 MG: 80 INJECTION, SOLUTION INTRAVENOUS at 10:43

## 2017-08-07 RX ADMIN — IBUPROFEN 600 MG: 600 TABLET, FILM COATED ORAL at 00:58

## 2017-08-07 RX ADMIN — PRENATAL VIT W/ FE FUMARATE-FA TAB 27-0.8 MG 1 TABLET: 27-0.8 TAB at 10:43

## 2017-08-07 RX ADMIN — FAMOTIDINE 20 MG: 20 TABLET ORAL at 17:38

## 2017-08-07 NOTE — PROGRESS NOTES
2017    Name:Georgie Winston    MR#:[id number]     PROGRESS NOTE:  Post-Op 1 S/P        Subjective   27 y.o. yo Female  s/p CS at 40w4d doing well. Pain well controlled, lochia appropriate.     Patient Active Problem List   Diagnosis   • Patient currently pregnant   • Right calf pain   • Pregnancy   • Normal labor        Objective    Vitals  Temp:  Temp:  [97.5 °F (36.4 °C)-98.9 °F (37.2 °C)] 98.9 °F (37.2 °C)  Temp src: Oral  BP:  BP: (110-129)/(61-74) 110/61  Pulse:  Heart Rate:  [] 87  RR:   Resp:  [16-20] 16    General Awake, alert, no distress  Abdomen Soft, non-distended, fundus firm, below umbilicus, appropriately tender  Incision  Intact, no erythema or exudate  Extremities Calves NT bilaterally     I/O last 3 completed shifts:  In: 2400 [I.V.:2100; IV Piggyback:300]  Out: 4700 [Urine:3700; Blood:1000]    LABS:   Lab Results   Component Value Date    WBC 11.10 (H) 2017    HGB 7.3 (L) 2017    HCT 22.3 (L) 2017    MCV 95.3 2017     (L) 2017       Infant: male       Assessment   1.  POD 1 from  Section 2/2 failure to progress/chorio     Plan: Doing well.    advance diet, may shower.  Tlast @ 2204 on ; continue abx until 24h afebrile.  Will restart FeSO4 for anemia.          Kelly Espinoza MD  2017 8:41 AM

## 2017-08-07 NOTE — PLAN OF CARE
Problem: Patient Care Overview (Adult)  Goal: Plan of Care Review  Outcome: Ongoing (interventions implemented as appropriate)    17 050   Coping/Psychosocial Response Interventions   Plan Of Care Reviewed With patient   Patient Care Overview   Progress improving       Goal: Adult Individualization and Mutuality  Outcome: Ongoing (interventions implemented as appropriate)    17 050   Individualization   Patient Specific Preferences breastfeeding   Patient Specific Goals have good pain control   Patient Specific Interventions assess pain and medicate as needed       Goal: Discharge Needs Assessment  Outcome: Ongoing (interventions implemented as appropriate)    17 050   Discharge Needs Assessment   Concerns To Be Addressed no discharge needs identified   Readmission Within The Last 30 Days no previous admission in last 30 days         Problem: Breastfeeding (Adult,NICU,,Obstetrics,Pediatric)  Goal: Signs and Symptoms of Listed Potential Problems Will be Absent or Manageable (Breastfeeding)  Outcome: Ongoing (interventions implemented as appropriate)    17 0500   Breastfeeding   Problems Assessed (Breastfeeding) all   Problems Present (Breastfeeding) none         Problem: Postpartum ( Delivery) (Adult)  Goal: Signs and Symptoms of Listed Potential Problems Will be Absent or Manageable (Postpartum)  Outcome: Ongoing (interventions implemented as appropriate)    17 050   Postpartum ( Delivery)   Problems Assessed (Postpartum ) all   Problems Present (Postpartum ) pain

## 2017-08-08 LAB
CYTO UR: NORMAL
HCT VFR BLD AUTO: 23.9 % (ref 34.5–44)
HGB BLD-MCNC: 8 G/DL (ref 11.5–15.5)
LAB AP CASE REPORT: NORMAL
LAB AP CLINICAL INFORMATION: NORMAL
LAB AP DIAGNOSIS COMMENT: NORMAL
Lab: NORMAL
PATH REPORT.FINAL DX SPEC: NORMAL
PATH REPORT.GROSS SPEC: NORMAL

## 2017-08-08 PROCEDURE — 85014 HEMATOCRIT: CPT | Performed by: OBSTETRICS & GYNECOLOGY

## 2017-08-08 PROCEDURE — 85018 HEMOGLOBIN: CPT | Performed by: OBSTETRICS & GYNECOLOGY

## 2017-08-08 RX ORDER — FLUCONAZOLE 150 MG/1
150 TABLET ORAL ONCE
Status: COMPLETED | OUTPATIENT
Start: 2017-08-08 | End: 2017-08-08

## 2017-08-08 RX ADMIN — IBUPROFEN 600 MG: 600 TABLET, FILM COATED ORAL at 12:45

## 2017-08-08 RX ADMIN — Medication: at 00:10

## 2017-08-08 RX ADMIN — NYSTATIN 500000 UNITS: 100000 SUSPENSION ORAL at 13:46

## 2017-08-08 RX ADMIN — FAMOTIDINE 20 MG: 20 TABLET ORAL at 18:04

## 2017-08-08 RX ADMIN — OXYCODONE AND ACETAMINOPHEN 1 TABLET: 5; 325 TABLET ORAL at 06:10

## 2017-08-08 RX ADMIN — OXYCODONE HYDROCHLORIDE AND ACETAMINOPHEN 1 TABLET: 10; 325 TABLET ORAL at 15:27

## 2017-08-08 RX ADMIN — IBUPROFEN 600 MG: 600 TABLET, FILM COATED ORAL at 23:21

## 2017-08-08 RX ADMIN — Medication 325 MG: at 18:04

## 2017-08-08 RX ADMIN — NYSTATIN 500000 UNITS: 100000 SUSPENSION ORAL at 07:54

## 2017-08-08 RX ADMIN — Medication 325 MG: at 07:34

## 2017-08-08 RX ADMIN — NYSTATIN 500000 UNITS: 100000 SUSPENSION ORAL at 21:05

## 2017-08-08 RX ADMIN — OXYCODONE AND ACETAMINOPHEN 1 TABLET: 5; 325 TABLET ORAL at 23:21

## 2017-08-08 RX ADMIN — FLUTICASONE PROPIONATE 2 SPRAY: 50 SPRAY, METERED NASAL at 16:37

## 2017-08-08 RX ADMIN — IBUPROFEN 600 MG: 600 TABLET, FILM COATED ORAL at 00:10

## 2017-08-08 RX ADMIN — FAMOTIDINE 20 MG: 20 TABLET ORAL at 07:35

## 2017-08-08 RX ADMIN — FLUCONAZOLE 150 MG: 150 TABLET ORAL at 23:22

## 2017-08-08 RX ADMIN — PANTOPRAZOLE SODIUM 40 MG: 40 TABLET, DELAYED RELEASE ORAL at 16:37

## 2017-08-08 RX ADMIN — PRENATAL VIT W/ FE FUMARATE-FA TAB 27-0.8 MG 1 TABLET: 27-0.8 TAB at 07:34

## 2017-08-08 RX ADMIN — OXYCODONE AND ACETAMINOPHEN 1 TABLET: 5; 325 TABLET ORAL at 00:10

## 2017-08-08 RX ADMIN — IBUPROFEN 600 MG: 600 TABLET, FILM COATED ORAL at 06:10

## 2017-08-08 NOTE — LACTATION NOTE
This note was copied from a baby's chart.     08/08/17 1025   Maternal Infant Assessment   Size Issue, Bilateral Breasts no   Shape, Bilateral Breasts round   Density, Bilateral Breasts soft   Nipples, Bilateral everted   Nipple Conditions, Right compression stripe;tender   Nipple Conditions, Bilateral tender   Infant Assessment   Sucking Reflex present   Rooting Reflex present   Swallow Reflex present   LATCH Score   Latch 2-->grasps breast, tongue down, lips flanged, rhythmic sucking   Audible Swallowing 2-->spontaneous and intermittent (24 hrs old)   Type Of Nipple 2-->everted (after stimulation)   Comfort (Breast/Nipple) 1-->filling, red/small blisters/bruises, mild/mod discomfort   Hold (Positioning) 2-->no assist from staff, mother able to position/hold infant   Score (less than 7 for 2/more consecutive times, consult Lactation Consultant) 9   Maternal Infant Feeding   Previous Breastfeeding History no   Infant Positioning cross-cradle   Signs of Milk Transfer audible swallow;infant jaw motion present   Latch Assistance no   Feeding Infant   Effective Latch During Feeding yes   Audible Swallow yes   Equipment Type/Education   Additional Equipment breast shells

## 2017-08-08 NOTE — PLAN OF CARE
Problem: Patient Care Overview (Adult)  Goal: Plan of Care Review  Outcome: Ongoing (interventions implemented as appropriate)    08/08/17 1025   Coping/Psychosocial Response Interventions   Plan Of Care Reviewed With patient;spouse   Patient Care Overview   Progress no change   Outcome Evaluation   Outcome Summary/Follow up Plan Infant latches and breast feeds very well. Patient's milk hand expresses easily. Patient will continue to feed infant on demand. She will wear soft shells between feedings to help marcos nipples and prevent nipple damage.

## 2017-08-09 VITALS
HEIGHT: 63 IN | DIASTOLIC BLOOD PRESSURE: 78 MMHG | BODY MASS INDEX: 33.66 KG/M2 | RESPIRATION RATE: 14 BRPM | SYSTOLIC BLOOD PRESSURE: 121 MMHG | WEIGHT: 190 LBS | TEMPERATURE: 98.2 F | OXYGEN SATURATION: 98 % | HEART RATE: 73 BPM

## 2017-08-09 PROBLEM — M79.661 RIGHT CALF PAIN: Status: RESOLVED | Noted: 2017-06-15 | Resolved: 2017-08-09

## 2017-08-09 PROBLEM — Z37.9 NORMAL LABOR: Status: RESOLVED | Noted: 2017-08-04 | Resolved: 2017-08-09

## 2017-08-09 RX ORDER — OXYCODONE HYDROCHLORIDE AND ACETAMINOPHEN 5; 325 MG/1; MG/1
1 TABLET ORAL EVERY 4 HOURS PRN
Qty: 20 TABLET | Refills: 0 | Status: SHIPPED | OUTPATIENT
Start: 2017-08-09 | End: 2017-08-16

## 2017-08-09 RX ORDER — IBUPROFEN 600 MG/1
600 TABLET ORAL EVERY 6 HOURS PRN
Qty: 20 TABLET | Refills: 0 | Status: SHIPPED | OUTPATIENT
Start: 2017-08-09 | End: 2018-08-23

## 2017-08-09 RX ADMIN — FLUTICASONE PROPIONATE 2 SPRAY: 50 SPRAY, METERED NASAL at 08:53

## 2017-08-09 RX ADMIN — Medication 325 MG: at 08:52

## 2017-08-09 RX ADMIN — FAMOTIDINE 20 MG: 20 TABLET ORAL at 08:52

## 2017-08-09 RX ADMIN — PANTOPRAZOLE SODIUM 40 MG: 40 TABLET, DELAYED RELEASE ORAL at 06:30

## 2017-08-09 RX ADMIN — PANTOPRAZOLE SODIUM 40 MG: 40 TABLET, DELAYED RELEASE ORAL at 08:53

## 2017-08-09 RX ADMIN — IBUPROFEN 600 MG: 600 TABLET, FILM COATED ORAL at 08:52

## 2017-08-09 RX ADMIN — PRENATAL VIT W/ FE FUMARATE-FA TAB 27-0.8 MG 1 TABLET: 27-0.8 TAB at 08:52

## 2017-08-09 RX ADMIN — NYSTATIN 500000 UNITS: 100000 SUSPENSION ORAL at 08:53

## 2017-08-09 NOTE — DISCHARGE SUMMARY
[facility]   Discharge Summary      Patient: Georgie Winston      MR#:[id number]  Admission  Diagnosis: <principal problem not specified>  Discharge Diagnosis:   1. Normal labor    2. Failure to progress in labor    3. Thrush, oral        Date of Admission: 2017  Date of Discharge:  2017    Procedures:  , Low Transverse     2017    12:06 AM      Service:  Obstetrics    Hospital Course:  Patient underwent  section and remained in the hospital for 5 days.  During that time she remained afebrile and hemodynamically stable.  On the day of discharge, she was eating, ambulating and voiding without difficulty.        Labs:    Lab Results (last 24 hours)     Procedure Component Value Units Date/Time    Tissue Pathology Exam [[id number]] Collected:  17 0003    Specimen:  Tissue from Placenta Updated:  17 1425     Case Report --     Surgical Pathology Report                         Case: [id number]                                  Authorizing Provider:  Antione Vazquez MD          Collected:           2017 12:03 AM          Ordering Location:     [facility]   Received:            2017 07:12 AM                                 LABOR DELIVERY                                                               Pathologist:           Rick Perez MD                                                         Specimen:    Placenta                                                                                    Clinical Information --     The working history is 40.3 week gestation, maternal fever of 100.5, fetal tachycardia and maternal tachycardia.        Final Diagnosis --      PLACENTA (615 GRAMS):  Three vessel umbilical cord with single vessel acute funisitis.  Third trimester villous maturation.  Deciduitis with focal acute chorionitis (see comment).  PCC/dlb         Comment --     There is inflammation in the decidua with focal extension into the  chorion seen on sections of the fetal membranes.  In addition, there is acute inflammation involving one vessel of the umbilical cord.       Gross Description --     Received in formalin labeled as placenta is an intact, garcia placenta with attached membranes and cord. The fetal membranes are tan, and thickened and show a marginal insertion. Site of rupture cannot be determined. The attached three vessel umbilical cord measures 36.0 cm in length by 1.8 cm in diameter and is tan/white to blue/gray with appropriate spiraling. No areas of torsion, hematomas, or true knots are appreciated. The cord inserts centrally, 5.0 cm from the placental disc margin. The placental disc has overall dimensions of 20.0 x 19.0 x 4.0 cm and weighs 615 grams trimmed of membranes and cord. The fetal surface is blue/gray glistening and well vascularized. The maternal surface is complete with diffuse tan calcifications and sectioning reveals maroon spongy parenchyma. No masses or large infarcts are appreciated. Representative sections are submitted. Summary of section: A - membrane roll and proximal cord; B - mid cord and peripheral disc; C - distal cord and central disc; D-E - random disc. PAVAN/anup          Microscopic Description --     The slides are reviewed and demonstrate histopathologic features supporting the above rendered diagnosis.          Embedded Images --          Discharge Medications   WinstonGeorgie Annel   Home Medication Instructions ISIDRO:[id number]    Printed on:08/09/17 0904   Medication Information                      acetaminophen (TYLENOL) 325 MG tablet  Take 650 mg by mouth Every 6 (Six) Hours As Needed for Mild Pain (1-3).             cetirizine (ZyrTEC) 10 MG tablet  Take 10 mg by mouth daily.             dimenhyDRINATE (DRAMAMINE) 50 MG tablet  Take 50 mg by mouth At Night As Needed for movement disorders.             doxylamine (UNISOM) 25 MG tablet  Take 50 mg by mouth At Night As Needed for Sleep.              ferrous sulfate 324 (65 FE) MG tablet delayed-release EC tablet  Take 324 mg by mouth Daily With Breakfast.             fluticasone (FLONASE) 50 MCG/ACT nasal spray  2 sprays into each nostril daily. Administer 2 sprays in each nostril for each dose.             ibuprofen (ADVIL,MOTRIN) 600 MG tablet  Take 1 tablet by mouth Every 6 (Six) Hours As Needed for Mild Pain (1-3).             ondansetron (ZOFRAN) 4 MG tablet  Take 1 tablet by mouth Every 8 (Eight) Hours As Needed for Nausea or Vomiting.             oxyCODONE-acetaminophen (PERCOCET) 5-325 MG per tablet  Take 1 tablet by mouth Every 4 (Four) Hours As Needed for Moderate Pain (4-6) for up to 7 days.             pantoprazole (PROTONIX) 40 MG EC tablet  1 po daily in the am 30 minutes before breakfast             pregabalin (LYRICA) 100 MG capsule  Take 150 mg by mouth as needed.             Prenatal Vit-Fe Fumarate-FA (PRENATAL, CLASSIC, VITAMIN) 28-0.8 MG tablet tablet  Take  by mouth Daily.             raNITIdine (ZANTAC) 150 MG tablet  Take 300 mg by mouth 2 (Two) Times a Day.             valACYclovir (VALTREX) 500 MG tablet  Take 500 mg by mouth Daily.                 Discharge Disposition:  To Home    Discharge Condition:  Stable    Discharge Diet: regular    Activity at Discharge: pelvic rest    Follow-up Appointments  No future appointments.      Dalia Diop MD  08/09/17  9:04 AM

## 2017-08-09 NOTE — PLAN OF CARE
Problem: Patient Care Overview (Adult)  Goal: Plan of Care Review  Outcome: Outcome(s) achieved Date Met:  17 1006   Coping/Psychosocial Response Interventions   Plan Of Care Reviewed With patient   Patient Care Overview   Progress improving       Goal: Adult Individualization and Mutuality  Outcome: Outcome(s) achieved Date Met:  17  Goal: Discharge Needs Assessment  Outcome: Outcome(s) achieved Date Met:  17    Problem: Breastfeeding (Adult,NICU,,Obstetrics,Pediatric)  Goal: Signs and Symptoms of Listed Potential Problems Will be Absent or Manageable (Breastfeeding)  Outcome: Outcome(s) achieved Date Met:  17    Problem: Postpartum ( Delivery) (Adult)  Goal: Signs and Symptoms of Listed Potential Problems Will be Absent or Manageable (Postpartum)  Outcome: Outcome(s) achieved Date Met:  17

## 2017-08-09 NOTE — PROGRESS NOTES
2017    Name:Georgie Winston    MR#:[id number]     PROGRESS NOTE:  Post-Op 3 S/P        Subjective   27 y.o. yo Female  s/p CS at 40w4d doing well. Pain well controlled, lochia appropriate, tolerating diet.     Patient Active Problem List   Diagnosis   • Patient currently pregnant   • Right calf pain   • Pregnancy   • Normal labor        Objective    Vitals  Temp:  Temp:  [98.1 °F (36.7 °C)-98.2 °F (36.8 °C)] 98.2 °F (36.8 °C)  Temp src: Oral  BP:  BP: (121-131)/(78-82) 121/78  Pulse:  Heart Rate:  [73-83] 73  RR:   Resp:  [14-16] 14    General Awake, alert, no distress  Abdomen Soft, non-distended, fundus firm, below umbilicus, appropriately tender  Incision  Intact, no erythema or exudate  Extremities Calves NT bilaterally          LABS:   Lab Results   Component Value Date    WBC 11.10 (H) 2017    HGB 8.0 (L) 2017    HCT 23.9 (L) 2017    MCV 95.3 2017     (L) 2017       Infant: male       Assessment   1.  POD 3 from  Section    Plan: Doing well.    Consideration for d/c as clinically indicated.   D/C instructions given, precautions reviewed.        Dlaia Diop MD  2017 8:38 AM

## 2017-08-09 NOTE — CONSULTS
Continued Stay Note  [facility]     Patient Name: Georgie Winston  MRN: [id number]  Today's Date: 8/9/2017    Admit Date: 8/4/2017          Discharge Plan       08/09/17 0753    Case Management/Social Work Plan    Plan Provided info on PPD.     Patient/Family In Agreement With Plan yes    Additional Comments Spoke with pt re: PPD s/s. Pt states she sees a therapist on regular basis. Denies SI/ HI. Provided info on PPD.               Discharge Codes     None            LEANDRO Bazan

## 2017-08-24 ENCOUNTER — TRANSCRIBE ORDERS (OUTPATIENT)
Dept: LAB | Facility: HOSPITAL | Age: 27
End: 2017-08-24

## 2017-08-24 ENCOUNTER — LAB (OUTPATIENT)
Dept: LAB | Facility: HOSPITAL | Age: 27
End: 2017-08-24

## 2017-08-24 LAB
BASOPHILS # BLD AUTO: 0.06 10*3/MM3 (ref 0–0.2)
BASOPHILS NFR BLD AUTO: 1 % (ref 0–1)
DEPRECATED RDW RBC AUTO: 46.6 FL (ref 37–54)
EOSINOPHIL # BLD AUTO: 0.09 10*3/MM3 (ref 0–0.3)
EOSINOPHIL NFR BLD AUTO: 1.5 % (ref 0–3)
ERYTHROCYTE [DISTWIDTH] IN BLOOD BY AUTOMATED COUNT: 13.3 % (ref 11.3–14.5)
HCT VFR BLD AUTO: 38.1 % (ref 34.5–44)
HGB BLD-MCNC: 12.2 G/DL (ref 11.5–15.5)
IMM GRANULOCYTES # BLD: 0.03 10*3/MM3 (ref 0–0.03)
IMM GRANULOCYTES NFR BLD: 0.5 % (ref 0–0.6)
LYMPHOCYTES # BLD AUTO: 2.94 10*3/MM3 (ref 0.6–4.8)
LYMPHOCYTES NFR BLD AUTO: 49.2 % (ref 24–44)
MCH RBC QN AUTO: 30.7 PG (ref 27–31)
MCHC RBC AUTO-ENTMCNC: 32 G/DL (ref 32–36)
MCV RBC AUTO: 96 FL (ref 80–99)
MONOCYTES # BLD AUTO: 0.38 10*3/MM3 (ref 0–1)
MONOCYTES NFR BLD AUTO: 6.4 % (ref 0–12)
NEUTROPHILS # BLD AUTO: 2.48 10*3/MM3 (ref 1.5–8.3)
NEUTROPHILS NFR BLD AUTO: 41.4 % (ref 41–71)
PLATELET # BLD AUTO: 398 10*3/MM3 (ref 150–450)
PMV BLD AUTO: 9.7 FL (ref 6–12)
RBC # BLD AUTO: 3.97 10*6/MM3 (ref 3.89–5.14)
WBC NRBC COR # BLD: 5.98 10*3/MM3 (ref 3.5–10.8)

## 2017-08-24 PROCEDURE — 36415 COLL VENOUS BLD VENIPUNCTURE: CPT

## 2017-08-24 PROCEDURE — 85025 COMPLETE CBC W/AUTO DIFF WBC: CPT | Performed by: OBSTETRICS & GYNECOLOGY

## 2018-08-23 ENCOUNTER — OFFICE VISIT (OUTPATIENT)
Dept: OBSTETRICS AND GYNECOLOGY | Facility: CLINIC | Age: 28
End: 2018-08-23

## 2018-08-23 VITALS
DIASTOLIC BLOOD PRESSURE: 58 MMHG | SYSTOLIC BLOOD PRESSURE: 124 MMHG | BODY MASS INDEX: 23.74 KG/M2 | WEIGHT: 134 LBS | HEIGHT: 63 IN

## 2018-08-23 DIAGNOSIS — N93.9 ABNORMAL UTERINE BLEEDING (AUB): Primary | ICD-10-CM

## 2018-08-23 PROCEDURE — 99214 OFFICE O/P EST MOD 30 MIN: CPT | Performed by: MIDWIFE

## 2018-08-23 RX ORDER — VALACYCLOVIR HYDROCHLORIDE 500 MG/1
500 TABLET, FILM COATED ORAL DAILY
Qty: 30 TABLET | Refills: 1 | Status: SHIPPED | OUTPATIENT
Start: 2018-08-23 | End: 2018-09-20

## 2018-08-23 RX ORDER — NORETHINDRONE 0.35 MG
1 KIT ORAL DAILY
Qty: 28 TABLET | Refills: 1 | Status: SHIPPED | OUTPATIENT
Start: 2018-08-23 | End: 2018-09-20 | Stop reason: SDUPTHER

## 2018-08-23 RX ORDER — NORETHINDRONE 0.35 MG
1 KIT ORAL DAILY
Refills: 4 | COMMUNITY
Start: 2018-07-17 | End: 2018-08-23 | Stop reason: SDUPTHER

## 2018-08-23 NOTE — PROGRESS NOTES
Subjective   Chief Complaint   Patient presents with   • Contraception     due for annual but on menses now, just started taking about 2 months ago, c/o irrgular spotting and bleeding      Georgie Winston is a 28 y.o. year old  presenting to be seen for spotting on birth control for 2 months.  Delivered her son 1 year ago by CSection, recently became sexually active.  She is still breastfeeding and started minipill, Sherobel 2 months ago.  She is taking them the same time daily and hasn't missed any.  She is having a lot of daily spotting. She denies cramping or pain.  She also has history of HSV and recently had an outbreak and needs a refill on Valtrex.  Her periods are usually monthly with 8-10 day flow.    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   • Panic attack 2013   • Parsonage-Lozano syndrome     left shoulder   • Thoracic nerve injury     left   • Urinary tract infection    , Past Surgical History:   Procedure Laterality Date   • ADENOIDECTOMY     •  SECTION N/A 2017    Procedure:  SECTION PRIMARY;  Surgeon: Antione Vazquez MD;  Location: [facility] LABOR DELIVERY;  Service:    • ENDOSCOPY AND COLONOSCOPY     • TONSILLECTOMY     • TONSILLECTOMY Bilateral    • UMBILICAL HERNIA REPAIR     • UMBILICAL HERNIA REPAIR     • WISDOM TOOTH EXTRACTION     , Family History   Problem Relation Age of Onset   • Hypertension Mother    • Diabetes Mother    • Colon polyps Mother    • Hypertension Father    • Diabetes Father    • Heart attack  "Father    • Alcohol abuse Maternal Uncle    • Liver disease Maternal Uncle    • Colon cancer Neg Hx    • Liver cancer Neg Hx    • Stomach cancer Neg Hx    • Esophageal cancer Neg Hx    , Social History   Substance Use Topics   • Smoking status: Former Smoker     Packs/day: 0.50     Years: 3.00     Quit date: 12/2016   • Smokeless tobacco: Never Used      Comment: quit december 2016   • Alcohol use No   ,   (Not in a hospital admission) and Allergies:  Contrast dye; Shellfish-derived products; Amoxicillin; Ciprofloxacin hcl; Erythromycin; Zithromax [azithromycin]; Demerol [meperidine]; and Flagyl [metronidazole]    Smoking status: Former Smoker                                                              Packs/day: 0.50      Years: 3.00         Quit date: 12/2016  Smokeless tobacco: Never Used                      Comment: quit december 2016      Review of Systems  Pertinent items are noted in HPI, all other systems reviewed and negative       Objective   /58   Ht 160 cm (63\")   Wt 60.8 kg (134 lb)   LMP 08/22/2018   Breastfeeding? No   BMI 23.74 kg/m²     Physical Exam:  General Appearance: alert and cooperative  Neck: suppple and trachea midline  Lungs: clear to auscultation, respirations regular and respirations even  Heart: regular rhythm and normal rate and no murmur, gallop, or rubs  Skin: color normal and no lesions noted  Neurologic: Mental Status orientated to person, place, time and situation, Speech normal content and execusion  Psych: normal mood and affect, thought content organized and appropriate judgment    Lab Review   No data reviewed    Imaging   No data reviewed         Assessment /Plan    Georgie was seen today for contraception.    Diagnoses and all orders for this visit:    Abnormal uterine bleeding (AUB)    Other orders  -     SHAROBEL 0.35 MG tablet; Take 1 tablet by mouth Daily. 200001  -     valACYclovir (VALTREX) 500 MG tablet; Take 1 tablet by mouth Daily.        Follow up " 1-2 months for annual exam           This note was electronically signed.  Marilou Juarez CNM  8/23/2018

## 2018-09-20 ENCOUNTER — OFFICE VISIT (OUTPATIENT)
Dept: OBSTETRICS AND GYNECOLOGY | Facility: CLINIC | Age: 28
End: 2018-09-20

## 2018-09-20 VITALS
SYSTOLIC BLOOD PRESSURE: 126 MMHG | BODY MASS INDEX: 23.92 KG/M2 | DIASTOLIC BLOOD PRESSURE: 70 MMHG | WEIGHT: 135 LBS | HEIGHT: 63 IN

## 2018-09-20 DIAGNOSIS — Z01.419 ENCOUNTER FOR GYNECOLOGICAL EXAMINATION WITHOUT ABNORMAL FINDING: Primary | ICD-10-CM

## 2018-09-20 DIAGNOSIS — Z12.4 SCREENING FOR CERVICAL CANCER: ICD-10-CM

## 2018-09-20 PROBLEM — Z34.90 PREGNANCY: Status: RESOLVED | Noted: 2017-07-10 | Resolved: 2018-09-20

## 2018-09-20 PROCEDURE — 99395 PREV VISIT EST AGE 18-39: CPT | Performed by: MIDWIFE

## 2018-09-20 RX ORDER — VALACYCLOVIR HYDROCHLORIDE 1 G/1
1000 TABLET, FILM COATED ORAL DAILY
Qty: 30 TABLET | Refills: 12 | Status: SHIPPED | OUTPATIENT
Start: 2018-09-20 | End: 2019-09-19

## 2018-09-20 RX ORDER — NORETHINDRONE 0.35 MG
1 KIT ORAL DAILY
Qty: 28 TABLET | Refills: 12 | Status: SHIPPED | OUTPATIENT
Start: 2018-09-20 | End: 2020-01-21

## 2018-09-20 NOTE — PROGRESS NOTES
Subjective   Chief Complaint   Patient presents with   • Gynecologic Exam     needs refill on birth control, no complaints      Georgie Winston is a 28 y.o. year old  presenting to be seen for her annual exam.   She is breastfeeding and plans to continue until baby boy is 2.  She is on minipill and wants to continue.  She has hx of HSV for which she takes Valtrex with outbreaks.  She C/O voiding frequently and also gets up at night to void. She doesn't drink much caffeine.    Past Medical History:   Diagnosis Date   • Abnormal Pap smear of cervix age 18    HPV, normal Paps since   • Anxiety and depression    • Back pain    • Depression    • Fibromyalgia    • Genital herpes during pregnancy     last outbreak was @ conception   • GERD (gastroesophageal reflux disease)    • H/O colonoscopy with polypectomy    • Head ache    • Herpes        • HPV (human papilloma virus) infection    • 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   • Panic attack    • Parsonage-Lozano syndrome     left shoulder   • Thoracic nerve injury     left   • Urinary tract infection         Current Outpatient Prescriptions:   •  doxylamine (UNISOM) 25 MG tablet, Take 50 mg by mouth At Night As Needed for Sleep., Disp: , Rfl:   •  Prenatal Vit-Fe Fumarate-FA (PRENATAL, CLASSIC, VITAMIN) 28-0.8 MG tablet tablet, Take  by mouth Daily., Disp: , Rfl:   •  SHAROBEL 0.35 MG tablet, Take 1 tablet by mouth Daily. , Disp: 28 tablet, Rfl: 12  •  valACYclovir (VALTREX) 1000 MG tablet, Take 1 tablet by mouth Daily for 364 days., Disp: 30 tablet, Rfl: 12   Allergies   Allergen Reactions   • Contrast Dye Anaphylaxis     Premedication for CT scan with prednisone, Benadryl, and Tagamet did not work for patient, she still had hives and swelling after contrast.   • Shellfish-Derived Products Anaphylaxis   • Amoxicillin Other (See Comments)     Yeast inf   • Ciprofloxacin Hcl Mental Status Change     Changes personality  "\"altered state of mind; I become violent & can't control my actions.\"   • Erythromycin Diarrhea and Nausea And Vomiting   • Zithromax [Azithromycin] Nausea And Vomiting     NVD   • Demerol [Meperidine] Hives     IV after surgery   • Flagyl [Metronidazole] Rash      Past Surgical History:   Procedure Laterality Date   • ADENOIDECTOMY     •  SECTION N/A 2017    Procedure:  SECTION PRIMARY;  Surgeon: Antione Vazquez MD;  Location: [facility] LABOR DELIVERY;  Service:    • ENDOSCOPY AND COLONOSCOPY     • TONSILLECTOMY     • TONSILLECTOMY Bilateral    • UMBILICAL HERNIA REPAIR     • UMBILICAL HERNIA REPAIR     • WISDOM TOOTH EXTRACTION        Social History     Social History   • Marital status: Single     Spouse name: N/A   • Number of children: N/A   • Years of education: N/A     Occupational History   • Not on file.     Social History Main Topics   • Smoking status: Former Smoker     Packs/day: 0.50     Years: 3.00     Quit date: 2016   • Smokeless tobacco: Never Used      Comment: quit 2016   • Alcohol use No   • Drug use: No   • Sexual activity: Yes     Partners: Male     Birth control/ protection: OCP     Other Topics Concern   • Not on file     Social History Narrative   • No narrative on file      Family History   Problem Relation Age of Onset   • Hypertension Mother    • Diabetes Mother    • Colon polyps Mother    • Hypertension Father    • Diabetes Father    • Heart attack Father    • Alcohol abuse Maternal Uncle    • Liver disease Maternal Uncle    • Colon cancer Neg Hx    • Liver cancer Neg Hx    • Stomach cancer Neg Hx    • Esophageal cancer Neg Hx        The following portions of the patient's history were reviewed and updated as appropriate:problem list, current medications, allergies, past family history, past medical history, past social history and past surgical history.    Review of Systems   Constitutional: Negative.    HENT: Negative.    Respiratory: Negative.  " "  Cardiovascular: Negative.    Gastrointestinal: Negative.    Genitourinary: Positive for frequency.   Musculoskeletal: Negative.    Skin: Negative.    Neurological: Negative.    Psychiatric/Behavioral: Negative.            Objective   /70   Ht 160 cm (63\")   Wt 61.2 kg (135 lb)   LMP 08/22/2018   Breastfeeding? Yes   BMI 23.91 kg/m²     Physical Exam   Constitutional   The patient is awake, alert, well developed, well nourished and well groomed.   Neck   The neck is supple and the trachea is midline. The thyroid is not enlarged and there are no palpable nodules.   Breast  Inspection of the breast reveals symmetrical and smooth breast bilaterally without skin color changes, lesions, dimpling or skin retraction.  The nipples are  everted and without discharge.  Palpation of the breast the tissue is non-tender, smooth, without masses.  The axillae are without masses.   Respiratory  The patient is relaxed and breathes without effort. Lungs CTAB  Cardiovascular  Heart regular rate and rhythm without murmur.  Gastrointestinal   The abdomen is soft and non-tender.  No hepatosplenomegaly.  Genitourinary   - External Genitalia without erythema, lesions, or masses  -Urethral Meatus is without erythema, edema, prolapse or lesions.   -Bladder - Palpation at the region above the symphysis pubis             reveals no bladder tenderness.   -Vagina - There is no excessive vaginal discharge.&    vaginal walls reveals moist vaginal mucosa without inflammation or lesions    There is no evidence of pelvic relaxation; there is no cystocele or rectocele.  -Cervix  is smooth, pink, and without discharge. Negative cervical motion tenderness   Uterus - uterine body is of normal size, shape, & without tenderness  Adnexa structures are nontender and without masses  Perineum is without inflammation or lesions   Extremities  Full ROM. No cyanosis or edema   Skin  Normal in color, texture, moisture, temperature, mobility and turgor. " There are no abnormal lesions.    Psychiatric  The patient is oriented to person, place, and time. Speech is fluent and words are clear          Assessment /Plan      Georgie was seen today for gynecologic exam.    Diagnoses and all orders for this visit:    Encounter for gynecological examination without abnormal finding  -     Liquid-based Pap Smear, Screening; Future    Screening for cervical cancer    Other orders  -     valACYclovir (VALTREX) 1000 MG tablet; Take 1 tablet by mouth Daily for 364 days.  -     SHAROBEL 0.35 MG tablet; Take 1 tablet by mouth Daily. 200001    Pap was done today.  If she does not receive the results of the Pap within 2 weeks  time, she was instructed to call to find out the results.  I explained to Georgie that the recommendations for Pap smear interval in a low risk patient  has lengthened to 3 years time.  I encouraged her to be seen yearly for a full physical exam including breast and pelvic exam even during the off years when PAP's will not be performed.    Discussed bladder training and OAB  May use OTC meds for hemorrhoids or see a surgeon.         Encouraged BSE    This note was electronically signed.    Marilou Juarez CNM   September 20, 2018

## 2018-09-28 DIAGNOSIS — Z01.419 ENCOUNTER FOR GYNECOLOGICAL EXAMINATION WITHOUT ABNORMAL FINDING: ICD-10-CM

## 2020-01-21 ENCOUNTER — APPOINTMENT (OUTPATIENT)
Dept: CT IMAGING | Facility: HOSPITAL | Age: 30
End: 2020-01-21

## 2020-01-21 ENCOUNTER — HOSPITAL ENCOUNTER (EMERGENCY)
Facility: HOSPITAL | Age: 30
Discharge: HOME | End: 2020-01-22
Attending: EMERGENCY MEDICINE | Admitting: EMERGENCY MEDICINE

## 2020-01-21 VITALS
DIASTOLIC BLOOD PRESSURE: 70 MMHG | BODY MASS INDEX: 27.64 KG/M2 | OXYGEN SATURATION: 98 % | HEIGHT: 63 IN | TEMPERATURE: 98.6 F | HEART RATE: 73 BPM | WEIGHT: 156 LBS | RESPIRATION RATE: 19 BRPM | SYSTOLIC BLOOD PRESSURE: 111 MMHG

## 2020-01-21 DIAGNOSIS — L76.82 PAIN AT SURGICAL INCISION: Primary | ICD-10-CM

## 2020-01-21 PROCEDURE — 99282 EMERGENCY DEPT VISIT SF MDM: CPT

## 2020-01-21 PROCEDURE — 74176 CT ABD & PELVIS W/O CONTRAST: CPT

## 2020-01-22 NOTE — ED PROVIDER NOTES
Subjective   Ms. Winston had a repeat  delivery 4 weeks ago.  The wound was healing normally but then she started having more pain to the right side of the wound.  She has returned to her OB's office several times and called the office on the phone about this.  The initial diagnosis was a fluid collection.  She later was placed on an antibiotic after a phone call to the office.  Later still she was seen again at the office and some fluid did drain from the wound.  The drainage has stopped but the pain is worsening.  It is not helped by hydrocodone.  It is worse with pressure on the area.  She is having no other complaints, lochia is gone, no urinary or bowel problems, no fever.    PMH/PSH/Social history reviewed          Review of Systems   Constitutional:        Breast feeding   HENT: Negative.    Respiratory: Negative.    Cardiovascular: Negative.    Gastrointestinal: Negative.    Neurological: Negative.    Psychiatric/Behavioral: Negative.    All other systems reviewed and are negative.      Past Medical History:   Diagnosis Date   • Abnormal Pap smear of cervix age 18    HPV, normal Paps since   • Anxiety and depression    • Back pain    • Depression    • Fibromyalgia    • Genital herpes during pregnancy     last outbreak was @ conception   • GERD (gastroesophageal reflux disease)    • H/O colonoscopy with polypectomy    • Head ache    • Herpes        • HPV (human papilloma virus) infection    • 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   • Panic attack 2013   • Parsonage-Lozano syndrome     left shoulder   • Thoracic nerve injury     left   • Urinary tract infection        Allergies   Allergen Reactions   • Contrast Dye Anaphylaxis     Premedication for CT scan with prednisone, Benadryl, and Tagamet did not work for patient, she still had hives and swelling after contrast.   • Shellfish-Derived Products Anaphylaxis   • Amoxicillin Other (See Comments)     Yeast inf   •  "Ciprofloxacin Hcl Mental Status Change     Changes personality \"altered state of mind; I become violent & can't control my actions.\"   • Erythromycin Diarrhea and Nausea And Vomiting   • Zithromax [Azithromycin] Nausea And Vomiting     NVD   • Demerol [Meperidine] Hives     IV after surgery   • Flagyl [Metronidazole] Rash       Past Surgical History:   Procedure Laterality Date   • ADENOIDECTOMY     •  SECTION N/A 2017    Procedure:  SECTION PRIMARY;  Surgeon: Antione Vazquez MD;  Location: [facility] LABOR DELIVERY;  Service:    • ENDOSCOPY AND COLONOSCOPY     • TONSILLECTOMY     • TONSILLECTOMY Bilateral    • UMBILICAL HERNIA REPAIR     • UMBILICAL HERNIA REPAIR     • WISDOM TOOTH EXTRACTION         Family History   Problem Relation Age of Onset   • Hypertension Mother    • Diabetes Mother    • Colon polyps Mother    • Hypertension Father    • Diabetes Father    • Heart attack Father    • Alcohol abuse Maternal Uncle    • Liver disease Maternal Uncle    • Colon cancer Neg Hx    • Liver cancer Neg Hx    • Stomach cancer Neg Hx    • Esophageal cancer Neg Hx        Social History     Socioeconomic History   • Marital status: Single     Spouse name: Not on file   • Number of children: Not on file   • Years of education: Not on file   • Highest education level: Not on file   Tobacco Use   • Smoking status: Former Smoker     Packs/day: 0.50     Years: 3.00     Pack years: 1.50     Last attempt to quit: 2016     Years since quitting: 3.1   • Smokeless tobacco: Never Used   • Tobacco comment: quit 2016   Substance and Sexual Activity   • Alcohol use: Yes     Comment: RARE   • Drug use: No   • Sexual activity: Yes     Partners: Male     Birth control/protection: OCP           Objective   Physical Exam   Constitutional: She is oriented to person, place, and time. She appears well-developed and well-nourished. No distress.   Pleasant, earnest female   HENT:   Head: Atraumatic.   Airway " patent   Eyes: Conjunctivae are normal.   Neck: Phonation normal. Neck supple.   Cardiovascular: Normal rate, regular rhythm and normal heart sounds.   Pulmonary/Chest: Effort normal and breath sounds normal. No respiratory distress.   Abdominal: Soft. There is tenderness (Nicely healing Pfannenstiel incision, tender right aspect, no palpable mass, no redness, no drainage).   Musculoskeletal: She exhibits no tenderness (no CVA or lumbar tenderness).   Neurological: She is alert and oriented to person, place, and time.   Skin: Skin is warm and dry. No rash noted.   Psychiatric: She has a normal mood and affect. Her behavior is normal.   Nursing note and vitals reviewed.      Procedures           ED Course  ED Course as of Jan 22 0036   Tue Jan 21, 2020   2492 No obvious pathology to explain the pain.  My best guess is that this is a nerve pain that will need some type of nerve block.    [LI]      ED Course User Index  [LI] Rashard Trejo MD                      [place] Coma Scale Score: 15                          MDM    Final diagnoses:   Pain at surgical incision            Rashard Trejo MD  01/22/20 0037

## 2020-01-22 NOTE — DISCHARGE INSTRUCTIONS
Call Dr. Rios this morning to inform her of this ER visit and the benign CT scan.  Ask if she believes that a nerve block could help the pain and how such can be arranged.  Otherwise, continue to avoid pressure on the area.

## 2022-05-09 NOTE — PROGRESS NOTES
2017    Name:Georgie Winston    MR#:[id number]     PROGRESS NOTE:  Post-Op 2 S/P        Subjective   27 y.o. yo Female  s/p CS at 40w4d doing well. Pain well controlled, lochia appropriate, tolerating diet.     Patient Active Problem List   Diagnosis   • Patient currently pregnant   • Right calf pain   • Pregnancy   • Normal labor        Objective    Vitals  Temp:  Temp:  [97.9 °F (36.6 °C)-98.3 °F (36.8 °C)] 98 °F (36.7 °C)  Temp src: Oral  BP:  BP: (122-137)/(69-87) 129/76  Pulse:  Heart Rate:  [84-97] 84  RR:   Resp:  [16-18] 18    General Awake, alert, no distress  Abdomen Soft, non-distended, fundus firm, below umbilicus, appropriately tender  Incision  Intact, no erythema or exudate  Extremities Calves NT bilaterally     I/O last 3 completed shifts:  In: 300 [IV Piggyback:300]  Out: 1050 [Urine:1050]    LABS:   Lab Results   Component Value Date    WBC 11.10 (H) 2017    HGB 8.0 (L) 2017    HCT 23.9 (L) 2017    MCV 95.3 2017     (L) 2017       Infant: male       Assessment   1.  POD 2 from  Section    Plan: Doing well.    Consideration for d/c as clinically indicated.           Dalia Diop MD  2017 12:48 PM   A pulse oximeter was placed on the patient's left index finger and left index finger.

## 2022-05-27 ENCOUNTER — TRANSCRIBE ORDERS (OUTPATIENT)
Dept: NUTRITION | Facility: HOSPITAL | Age: 32
End: 2022-05-27

## 2022-05-27 DIAGNOSIS — K58.9 IRRITABLE BOWEL SYNDROME, UNSPECIFIED TYPE: Primary | ICD-10-CM

## 2022-05-27 DIAGNOSIS — R63.4 ABNORMAL WEIGHT LOSS: ICD-10-CM

## 2023-01-05 ENCOUNTER — LAB (OUTPATIENT)
Dept: LAB | Facility: HOSPITAL | Age: 33
End: 2023-01-05
Payer: MEDICAID

## 2023-01-05 ENCOUNTER — OFFICE VISIT (OUTPATIENT)
Dept: GYNECOLOGIC ONCOLOGY | Facility: CLINIC | Age: 33
End: 2023-01-05
Payer: MEDICAID

## 2023-01-05 VITALS
SYSTOLIC BLOOD PRESSURE: 102 MMHG | OXYGEN SATURATION: 95 % | WEIGHT: 120 LBS | BODY MASS INDEX: 21.26 KG/M2 | HEART RATE: 77 BPM | RESPIRATION RATE: 16 BRPM | HEIGHT: 63 IN | TEMPERATURE: 98.9 F | DIASTOLIC BLOOD PRESSURE: 77 MMHG

## 2023-01-05 DIAGNOSIS — N93.9 ABNORMAL UTERINE BLEEDING: ICD-10-CM

## 2023-01-05 DIAGNOSIS — G89.29 CHRONIC PELVIC PAIN IN FEMALE: ICD-10-CM

## 2023-01-05 DIAGNOSIS — R10.2 CHRONIC PELVIC PAIN IN FEMALE: ICD-10-CM

## 2023-01-05 DIAGNOSIS — N87.9 CERVICAL DYSPLASIA: Primary | ICD-10-CM

## 2023-01-05 LAB
ALBUMIN SERPL-MCNC: 4.6 G/DL (ref 3.5–5.2)
ALBUMIN/GLOB SERPL: 1.6 G/DL
ALP SERPL-CCNC: 30 U/L (ref 39–117)
ALT SERPL W P-5'-P-CCNC: 12 U/L (ref 1–33)
ANION GAP SERPL CALCULATED.3IONS-SCNC: 11.1 MMOL/L (ref 5–15)
AST SERPL-CCNC: 18 U/L (ref 1–32)
BASOPHILS # BLD AUTO: 0.04 10*3/MM3 (ref 0–0.2)
BASOPHILS NFR BLD AUTO: 0.6 % (ref 0–1.5)
BILIRUB SERPL-MCNC: 0.4 MG/DL (ref 0–1.2)
BUN SERPL-MCNC: 9 MG/DL (ref 6–20)
BUN/CREAT SERPL: 9.5 (ref 7–25)
CALCIUM SPEC-SCNC: 9.1 MG/DL (ref 8.6–10.5)
CHLORIDE SERPL-SCNC: 106 MMOL/L (ref 98–107)
CO2 SERPL-SCNC: 24.9 MMOL/L (ref 22–29)
CREAT SERPL-MCNC: 0.95 MG/DL (ref 0.57–1)
DEPRECATED RDW RBC AUTO: 40.2 FL (ref 37–54)
EGFRCR SERPLBLD CKD-EPI 2021: 81.8 ML/MIN/1.73
EOSINOPHIL # BLD AUTO: 0.03 10*3/MM3 (ref 0–0.4)
EOSINOPHIL NFR BLD AUTO: 0.5 % (ref 0.3–6.2)
ERYTHROCYTE [DISTWIDTH] IN BLOOD BY AUTOMATED COUNT: 11.9 % (ref 12.3–15.4)
GLOBULIN UR ELPH-MCNC: 2.8 GM/DL
GLUCOSE SERPL-MCNC: 87 MG/DL (ref 65–99)
HCT VFR BLD AUTO: 40.4 % (ref 34–46.6)
HGB BLD-MCNC: 13.9 G/DL (ref 12–15.9)
IMM GRANULOCYTES # BLD AUTO: 0.03 10*3/MM3 (ref 0–0.05)
IMM GRANULOCYTES NFR BLD AUTO: 0.5 % (ref 0–0.5)
LYMPHOCYTES # BLD AUTO: 2.36 10*3/MM3 (ref 0.7–3.1)
LYMPHOCYTES NFR BLD AUTO: 38.1 % (ref 19.6–45.3)
MCH RBC QN AUTO: 31.4 PG (ref 26.6–33)
MCHC RBC AUTO-ENTMCNC: 34.4 G/DL (ref 31.5–35.7)
MCV RBC AUTO: 91.4 FL (ref 79–97)
MONOCYTES # BLD AUTO: 0.41 10*3/MM3 (ref 0.1–0.9)
MONOCYTES NFR BLD AUTO: 6.6 % (ref 5–12)
NEUTROPHILS NFR BLD AUTO: 3.33 10*3/MM3 (ref 1.7–7)
NEUTROPHILS NFR BLD AUTO: 53.7 % (ref 42.7–76)
NRBC BLD AUTO-RTO: 0 /100 WBC (ref 0–0.2)
PLATELET # BLD AUTO: 258 10*3/MM3 (ref 140–450)
PMV BLD AUTO: 9.7 FL (ref 6–12)
POTASSIUM SERPL-SCNC: 4.2 MMOL/L (ref 3.5–5.2)
PROT SERPL-MCNC: 7.4 G/DL (ref 6–8.5)
RBC # BLD AUTO: 4.42 10*6/MM3 (ref 3.77–5.28)
SODIUM SERPL-SCNC: 142 MMOL/L (ref 136–145)
TSH SERPL DL<=0.05 MIU/L-ACNC: 1.03 UIU/ML (ref 0.27–4.2)
WBC NRBC COR # BLD: 6.2 10*3/MM3 (ref 3.4–10.8)

## 2023-01-05 PROCEDURE — 80053 COMPREHEN METABOLIC PANEL: CPT

## 2023-01-05 PROCEDURE — 99205 OFFICE O/P NEW HI 60 MIN: CPT | Performed by: OBSTETRICS & GYNECOLOGY

## 2023-01-05 PROCEDURE — 36415 COLL VENOUS BLD VENIPUNCTURE: CPT

## 2023-01-05 PROCEDURE — 84443 ASSAY THYROID STIM HORMONE: CPT

## 2023-01-05 PROCEDURE — 85025 COMPLETE CBC W/AUTO DIFF WBC: CPT

## 2023-01-05 RX ORDER — LEVONORGESTREL / ETHINYL ESTRADIOL AND ETHINYL ESTRADIOL 150-30(84)
1 KIT ORAL DAILY
COMMUNITY
Start: 2022-11-22 | End: 2023-02-21

## 2023-01-05 NOTE — PROGRESS NOTES
New Patient Office Visit      Patient Name: Georgie Winston  : 1990   MRN: [id number]      Referring Physician: Cristiana Xie DO    Chief Complaint:  High grade cervical dyplasia, chronic pelvic pain.     History of Present Illness: Georgie Winston is a 32 y.o. female who is here today as a consultation with gynecologic oncology for a high grade cervical dysplasia and a history of chronic pelvic pain. She recently underwent a pap test in September that was ASCUS with positive HRHPV. Colposcopy with biopsies showed PERCY II-III. It was recommended that she undergo an excisional procedure for further assessment. Additionally, she has a longstanding history of chronic pelvic pain and possible endometriosis. She also reports being told that she may have interstitial cystitis.      Since menarche she reports that she has had chronic pelvic pain and back pain that is at times unbearable. She also has some difficulty emptying her bladder and pain when initiating stream. Denies increased urinary frequency, hematuria, urinary incontinence. Over the past several months however, she hs had abnormal uterine bleeding with periods lasting two weeks with heavy bleeding (6 super pads a day) and intense cramping not improving with OTC medications. She states she developed kidney and liver damage when she was managing her pain with Ibuprofen and Acetaminophen. She underwent TVUS which demonstrated a R sided hemorrhagic cyst, which was believed to have subsequently ruptured in the following few days and was managed conservatively. This pain has since resolved.  Overall, today she is feeling well. Does endorse anxiety that she manages with counseling, and feels this is well-controlled.    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   • Panic attack 2013   • Parsonage-Lozano syndrome     left shoulder   • Thoracic nerve injury     left   • Urinary tract infection        Past Surgical History:   Past Surgical History:   Procedure Laterality Date   • ADENOIDECTOMY     •  SECTION N/A 2017    Procedure:  SECTION PRIMARY;  Surgeon: Antione Vazquez MD;  Location: [facility] LABOR DELIVERY;  Service:    • ENDOSCOPY AND COLONOSCOPY     • TONSILLECTOMY     • TONSILLECTOMY Bilateral    • UMBILICAL HERNIA REPAIR     • UMBILICAL HERNIA REPAIR     • WISDOM TOOTH EXTRACTION         Family History:   Family History   Problem Relation Age of Onset   • Hypertension Mother    • Diabetes Mother    • Colon polyps Mother    • Hypertension Father    • Diabetes Father    • Heart attack Father    • Alcohol abuse Maternal Uncle    • Liver disease Maternal Uncle    • Colon cancer Neg Hx    • Liver cancer Neg Hx    • Stomach cancer Neg Hx    • Esophageal cancer Neg Hx        Social History:   Social History     Socioeconomic History   • Marital status: Single   Tobacco Use   • Smoking status: Former     Packs/day: 0.50     Years: 3.00     Pack years: 1.50     Types: Cigarettes     Quit date: 2016     Years since quittin.1   • Smokeless tobacco: Never   • Tobacco comments:     quit 2016   Substance and Sexual Activity   • Alcohol use: Yes     Comment: RARE   • Drug use: No   • Sexual activity: Yes     Partners: Male     Birth control/protection: OCP       Past OB/GYN History:   OB History    Para Term  AB Living   1 1 1     1   SAB IAB Ectopic Molar Multiple Live Births           0 1      # Outcome Date GA Lbr Manuel/2nd Weight Sex Delivery Anes  PTL Lv   1 Term 08/06/17 40w4d  3352 g (7 lb 6.2 oz) M CS-LTranv Spinal, EPI N SAVANNAH      Complications: Failure to Progress in First Stage, Dysfunctional Labor      Medications:     Current Outpatient Medications:   •  Levonorgest-Eth Estrad 91-Day 0.15-0.03 &0.01 MG tablet, Take 1 tablet by mouth Daily., Disp: , Rfl:   •  Prenatal Vit-Fe Fumarate-FA (PRENATAL, CLASSIC, VITAMIN) 28-0.8 MG tablet tablet, Take  by mouth Daily., Disp: , Rfl:     Allergies:   Allergies   Allergen Reactions   • Contrast Dye Anaphylaxis     Premedication for CT scan with prednisone, Benadryl, and Tagamet did not work for patient, she still had hives and swelling after contrast.   • Shellfish Allergy Anaphylaxis   • Shellfish-Derived Products Anaphylaxis   • Amoxicillin Other (See Comments)     Yeast inf   • Ciprofloxacin Hcl Mental Status Change     Changes personality \"altered state of mind; I become violent & can't control my actions.\"   • Erythromycin Diarrhea and Nausea And Vomiting   • Gabapentin GI Intolerance   • Zithromax [Azithromycin] Nausea And Vomiting     NVD   • Demerol [Meperidine] Hives     IV after surgery   • Flagyl [Metronidazole] Rash       Review of Systems:   As per HPI, otherwise negative.    Objective     Physical Exam:  Vital Signs:   Vitals:    01/05/23 1121   BP: 102/77   Pulse: 77   Resp: 16   Temp: 98.9 °F (37.2 °C)   TempSrc: Temporal   SpO2: 95%   Weight: 54.4 kg (120 lb)   Height: 160 cm (63\")   PainSc:   4   PainLoc: Abdomen  Comment: lower abdomen, like menstrul cramps     BMI: Body mass index is 21.26 kg/m².   ECOG score: 0           PHQ-2 Depression Screening  Little interest or pleasure in doing things?     Feeling down, depressed, or hopeless?     PHQ-2 Total Score       Physical Examination:   General appearance - alert, well appearing, and in no distress and oriented to person, place, and time  Mental status - normal mood, behavior, speech, dress, motor activity, and thought processes  Eyes - sclera  anicteric, left eye normal, right eye normal  Ears - right ear normal, left ear normal  Nose - mask  Mouth - mask  Lymphatics - no palpable lymphadenopathy, no hepatosplenomegaly  Lungs - normal respiratory effort, clear to auscultation  Heart - normal rate, regular rhythm, normal S1, S2, no murmurs, rubs, clicks or gallops  Abdomen - soft, nontender, nondistended, no masses or organomegaly  Pelvic - normal external genitalia without lesions, vagina normal and without discharge, mild tenderness to palpation of the bilateral levator ani with some spasm, bladder with significant tenderness to palpation, cervix with hyperemia around the cervical os, uterus approximately 7 weeks size, unable to palpate adnexa  Neurological - alert, oriented, normal speech, no focal findings or movement disorder noted  Musculoskeletal - no joint tenderness, deformity or swelling  Extremities - peripheral pulses normal, no pedal edema, no clubbing or cyanosis  Skin - normal coloration and turgor, no rashes, no suspicious skin lesions noted     Imaging:  TVUS 12/8/22 - Heterogeneous myometrium with probable fibroids. 2.2 cm hypoechoic left ovarian lesion favored to be a hemorrhagic cyst. Can follow in 6 or 10 weeks if clinically indicated.     Assessment / Plan      Georgie Winston is a 32 y.o. who presents with a new diagnosis of PERCY III and chronic pelvic pain. In terms of her high grade cervical dyplasia I recommend that she undergo cold knife conization for diagnostic and therapeutic purposes. Discussed risk of the procedure including bleeding, infection, discomfort, vaginal discharge, and the very low risk of injury to neighboring structures.  We also discussed postoperative expectations. We will also plan on performing a diagnostic laparoscopy and cystoscopy at the time of her procedure as well to evaluate her pelvic pain. I did discuss my concerns that she has interstitial cystitis/painful bladder syndrome. She has multiple risk  factors for this. I anticipate that she will need a urology referral postoperatively. May also benefit from re-engaging pelvic floor physical therapy.     Abnormal uterine bleeding: Will obtain curetting at time of procedure. Possible that fibroids are playing a role in this versus having breakthrough bleeding due to her low-estrogen, 91 day pills. CBC and TSH for completion today. She does desire future fertility so hysterectomy is not an option at this time.       Diagnoses and all orders for this visit:    1. Cervical dysplasia (Primary)  -     External Facility Surgical / Procedural Request; Future    2. Abnormal uterine bleeding  -     CBC & Differential; Future  -     Comprehensive Metabolic Panel; Future  -     TSH; Future  -     External Facility Surgical / Procedural Request; Future    3. Chronic pelvic pain in female    Other orders  -     SCANNED PATHOLOGY         Follow Up: will need pre-operative appointment, otherwise will see for surgery 1/25/2022.    Ladan Guzman MD  Gynecologic Oncology Resident    Patient was seen and examined with Dr. Xie,  resident, who performed portions of the examination and documentation for this patient's care under my direct supervision.  I agree with the above documentation and plan.    I spent 60 minutes caring for Georgie on this date of service. This time includes time spent by me in the following activities: preparing for the visit, reviewing tests, obtaining and/or reviewing a separately obtained history, performing a medically appropriate examination and/or evaluation, counseling and educating the patient/family/caregiver, ordering medications, tests, or procedures, referring and communicating with other health care professionals, documenting information in the medical record and care coordination      Angelica La MD  Gynecologic Oncology

## 2023-01-05 NOTE — LETTER
2023     Cristiana Xie DO  [address]  [address]  [facility] [address]    Patient: Georgie Winston   YOB: 1990   Date of Visit: 2023       Dear Cristiana Xie DO,    Thank you for referring Georgie Winston to me for evaluation. Below is a copy of my consult note.    If you have questions, please do not hesitate to call me. I look forward to following Georgie along with you.         Sincerely,        Angelica La MD        CC: No Recipients         New Patient Office Visit      Patient Name: Georgie Winston  : 1990   MRN: [id number]      Referring Physician: Cristiana Xie DO    Chief Complaint:  High grade cervical dyplasia, chronic pelvic pain.     History of Present Illness: Georgie Winston is a 32 y.o. female who is here today as a consultation with gynecologic oncology for a high grade cervical dysplasia and a history of chronic pelvic pain. She recently underwent a pap test in September that was ASCUS with positive HRHPV. Colposcopy with biopsies showed PERCY II-III. It was recommended that she undergo an excisional procedure for further assessment. Additionally, she has a longstanding history of chronic pelvic pain and possible endometriosis. She also reports being told that she may have interstitial cystitis.      Since menarche she reports that she has had chronic pelvic pain and back pain that is at times unbearable. She also has some difficulty emptying her bladder and pain when initiating stream. Denies increased urinary frequency, hematuria, urinary incontinence. Over the past several months however, she hs had abnormal uterine bleeding with periods lasting two weeks with heavy bleeding (6 super pads a day) and intense cramping not improving with OTC medications. She states she developed kidney and liver damage when she was managing her pain with Ibuprofen and Acetaminophen. She underwent TVUS which demonstrated a R sided hemorrhagic cyst,  which was believed to have subsequently ruptured in the following few days and was managed conservatively. This pain has since resolved.  Overall, today she is feeling well. Does endorse anxiety that she manages with counseling, and feels this is well-controlled.    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   • Panic attack    • Parsonage-Lozano syndrome     left shoulder   • Thoracic nerve injury     left   • Urinary tract infection        Past Surgical History:   Past Surgical History:   Procedure Laterality Date   • ADENOIDECTOMY     •  SECTION N/A 2017    Procedure:  SECTION PRIMARY;  Surgeon: Antione Vazquez MD;  Location: [facility] LABOR DELIVERY;  Service:    • ENDOSCOPY AND COLONOSCOPY     • TONSILLECTOMY     • TONSILLECTOMY Bilateral    • UMBILICAL HERNIA REPAIR     • UMBILICAL HERNIA REPAIR     • WISDOM TOOTH EXTRACTION         Family History:   Family History   Problem Relation Age of Onset   • Hypertension Mother    • Diabetes Mother    • Colon polyps Mother    • Hypertension Father    • Diabetes Father    • Heart attack Father    • Alcohol abuse Maternal Uncle    • Liver disease Maternal Uncle    • Colon cancer Neg Hx    • Liver cancer Neg Hx    • Stomach cancer Neg Hx    • Esophageal cancer Neg Hx        Social History:   Social History     Socioeconomic History   • Marital status: Single   Tobacco Use   • Smoking status: Former     Packs/day: 0.50     Years: 3.00     Pack years: 1.50     Types: Cigarettes      Quit date: 2016     Years since quittin.1   • Smokeless tobacco: Never   • Tobacco comments:     quit 2016   Substance and Sexual Activity   • Alcohol use: Yes     Comment: RARE   • Drug use: No   • Sexual activity: Yes     Partners: Male     Birth control/protection: OCP       Past OB/GYN History:   OB History    Para Term  AB Living   1 1 1     1   SAB IAB Ectopic Molar Multiple Live Births           0 1      # Outcome Date GA Lbr Manuel/2nd Weight Sex Delivery Anes PTL Lv   1 Term 17 40w4d  3352 g (7 lb 6.2 oz) M CS-LTranv Spinal, EPI N SAVANNAH      Complications: Failure to Progress in First Stage, Dysfunctional Labor      Medications:     Current Outpatient Medications:   •  Levonorgest-Eth Estrad 91-Day 0.15-0.03 &0.01 MG tablet, Take 1 tablet by mouth Daily., Disp: , Rfl:   •  Prenatal Vit-Fe Fumarate-FA (PRENATAL, CLASSIC, VITAMIN) 28-0.8 MG tablet tablet, Take  by mouth Daily., Disp: , Rfl:     Allergies:   Allergies   Allergen Reactions   • Contrast Dye Anaphylaxis     Premedication for CT scan with prednisone, Benadryl, and Tagamet did not work for patient, she still had hives and swelling after contrast.   • Shellfish Allergy Anaphylaxis   • Shellfish-Derived Products Anaphylaxis   • Amoxicillin Other (See Comments)     Yeast inf   • Ciprofloxacin Hcl Mental Status Change     Changes personality \"altered state of mind; I become violent & can't control my actions.\"   • Erythromycin Diarrhea and Nausea And Vomiting   • Gabapentin GI Intolerance   • Zithromax [Azithromycin] Nausea And Vomiting     NVD   • Demerol [Meperidine] Hives     IV after surgery   • Flagyl [Metronidazole] Rash       Review of Systems:   As per HPI, otherwise negative.    Objective     Physical Exam:  Vital Signs:   Vitals:    23 1121   BP: 102/77   Pulse: 77   Resp: 16   Temp: 98.9 °F (37.2 °C)   TempSrc: Temporal   SpO2: 95%   Weight: 54.4 kg (120 lb)   Height: 160 cm (63\")   PainSc:   4   PainLoc:  Abdomen  Comment: lower abdomen, like menstrul cramps     BMI: Body mass index is 21.26 kg/m².   ECOG score: 0           PHQ-2 Depression Screening  Little interest or pleasure in doing things?     Feeling down, depressed, or hopeless?     PHQ-2 Total Score       Physical Examination:   General appearance - alert, well appearing, and in no distress and oriented to person, place, and time  Mental status - normal mood, behavior, speech, dress, motor activity, and thought processes  Eyes - sclera anicteric, left eye normal, right eye normal  Ears - right ear normal, left ear normal  Nose - mask  Mouth - mask  Lymphatics - no palpable lymphadenopathy, no hepatosplenomegaly  Lungs - normal respiratory effort, clear to auscultation  Heart - normal rate, regular rhythm, normal S1, S2, no murmurs, rubs, clicks or gallops  Abdomen - soft, nontender, nondistended, no masses or organomegaly  Pelvic - normal external genitalia without lesions, vagina normal and without discharge, mild tenderness to palpation of the bilateral levator ani with some spasm, bladder with significant tenderness to palpation, cervix with hyperemia around the cervical os, uterus approximately 7 weeks size, unable to palpate adnexa  Neurological - alert, oriented, normal speech, no focal findings or movement disorder noted  Musculoskeletal - no joint tenderness, deformity or swelling  Extremities - peripheral pulses normal, no pedal edema, no clubbing or cyanosis  Skin - normal coloration and turgor, no rashes, no suspicious skin lesions noted     Imaging:  TVUS 12/8/22 - Heterogeneous myometrium with probable fibroids. 2.2 cm hypoechoic left ovarian lesion favored to be a hemorrhagic cyst. Can follow in 6 or 10 weeks if clinically indicated.     Assessment / Plan      Georgie Winston is a 32 y.o. who presents with a new diagnosis of PERCY III and chronic pelvic pain. In terms of her high grade cervical dyplasia I recommend that she undergo cold knife  conization for diagnostic and therapeutic purposes. Discussed risk of the procedure including bleeding, infection, discomfort, vaginal discharge, and the very low risk of injury to neighboring structures.  We also discussed postoperative expectations. We will also plan on performing a diagnostic laparoscopy and cystoscopy at the time of her procedure as well to evaluate her pelvic pain. I did discuss my concerns that she has interstitial cystitis/painful bladder syndrome. She has multiple risk factors for this. I anticipate that she will need a urology referral postoperatively. May also benefit from re-engaging pelvic floor physical therapy.     Abnormal uterine bleeding: Will obtain curetting at time of procedure. Possible that fibroids are playing a role in this versus having breakthrough bleeding due to her low-estrogen, 91 day pills. CBC and TSH for completion today. She does desire future fertility so hysterectomy is not an option at this time.       Diagnoses and all orders for this visit:    1. Cervical dysplasia (Primary)  -     External Facility Surgical / Procedural Request; Future    2. Abnormal uterine bleeding  -     CBC & Differential; Future  -     Comprehensive Metabolic Panel; Future  -     TSH; Future  -     External Facility Surgical / Procedural Request; Future    3. Chronic pelvic pain in female    Other orders  -     SCANNED PATHOLOGY         Follow Up: will need pre-operative appointment, otherwise will see for surgery 1/25/2022.    Ladan Guzman MD  Gynecologic Oncology Resident    Patient was seen and examined with Dr. Xie,  resident, who performed portions of the examination and documentation for this patient's care under my direct supervision.  I agree with the above documentation and plan.    I spent 60 minutes caring for Georgie on this date of service. This time includes time spent by me in the following activities: preparing for the visit, reviewing tests, obtaining and/or  reviewing a separately obtained history, performing a medically appropriate examination and/or evaluation, counseling and educating the patient/family/caregiver, ordering medications, tests, or procedures, referring and communicating with other health care professionals, documenting information in the medical record and care coordination      Angelica La MD  Gynecologic Oncology

## 2023-01-06 ENCOUNTER — TELEPHONE (OUTPATIENT)
Dept: GYNECOLOGIC ONCOLOGY | Facility: CLINIC | Age: 33
End: 2023-01-06
Payer: MEDICAID

## 2023-01-06 NOTE — TELEPHONE ENCOUNTER
----- Message from Angelica La MD sent at 1/6/2023  7:01 AM EST -----  Please let Georgiemt Winston know that her labs are normal including her TSH. I will see her for surgery as scheduled.

## 2023-01-13 ENCOUNTER — TELEPHONE (OUTPATIENT)
Dept: GYNECOLOGIC ONCOLOGY | Facility: CLINIC | Age: 33
End: 2023-01-13
Payer: MEDICAID

## 2023-01-13 DIAGNOSIS — G89.29 CHRONIC PELVIC PAIN IN FEMALE: Primary | ICD-10-CM

## 2023-01-13 DIAGNOSIS — R30.9 PAINFUL URINATION: ICD-10-CM

## 2023-01-13 DIAGNOSIS — R10.2 CHRONIC PELVIC PAIN IN FEMALE: Primary | ICD-10-CM

## 2023-01-13 NOTE — TELEPHONE ENCOUNTER
Received VM from patient. She states that she has had pain in her lower abdomen when she urinates for months that is getting progressively worse. She states it is so bad now that she is avoiding drinking so she won't have to urinate. She knows she is getting dehydrated and would like thoughts on what she can do to stop the pain. She has been tested for UTIs and they are always negative. Please advise.

## 2023-01-13 NOTE — TELEPHONE ENCOUNTER
Discussed with ROD Loya.  Patient can try azo OTC for bladder spasms for a few days to see if it helps with the pain.  Dr. La planned to possibly refer her to urology after surgery.  Ok to refer to urology now if patient would like to be seen sooner.   Discussed with patient.  She will have her   some azo today.  She would like to proceed with urology referral.  Ok to schedule in [place] or [place].  She states she was advised she had interstitial cystitis as a child.  Referral entered.  Patient advised to call back if no relief from azo.  Verbalized understanding.

## 2023-01-24 ENCOUNTER — TELEPHONE (OUTPATIENT)
Dept: GYNECOLOGIC ONCOLOGY | Facility: CLINIC | Age: 33
End: 2023-01-24
Payer: MEDICAID

## 2023-01-24 NOTE — TELEPHONE ENCOUNTER
Caller: Georgie Winston    Relationship: Self    Best call back number: [phone]    What is the best time to reach you: ASAP    Who are you requesting to speak with (clinical staff, provider,  specific staff member): CLINICAL    What was the call regarding: PT IS NEEDING TO FIND OUT WHAT SHE NEEDS TO DO TODAY TO PREPARE FOR HER SURGERY TOMORROW.    Do you require a callback: YES, PLEASE CALL BACK TODAY TO ADVISE PT. WHAT SHE NEEDS TO DO TODAY TO PREPARE.  IS THERE PRE-OP PAPERWORK OR ANY PRE-SURGERY TESTS LIKE COVID TESTS, ETC.  AND WHAT IS

## 2023-01-25 ENCOUNTER — LAB REQUISITION (OUTPATIENT)
Dept: LAB | Facility: HOSPITAL | Age: 33
End: 2023-01-25
Payer: MEDICAID

## 2023-01-25 ENCOUNTER — OUTSIDE FACILITY SERVICE (OUTPATIENT)
Dept: GYNECOLOGIC ONCOLOGY | Facility: CLINIC | Age: 33
End: 2023-01-25
Payer: MEDICAID

## 2023-01-25 ENCOUNTER — TELEPHONE (OUTPATIENT)
Dept: GYNECOLOGIC ONCOLOGY | Facility: CLINIC | Age: 33
End: 2023-01-25

## 2023-01-25 DIAGNOSIS — N93.9 ABNORMAL UTERINE AND VAGINAL BLEEDING, UNSPECIFIED: ICD-10-CM

## 2023-01-25 DIAGNOSIS — N87.9 CERVICAL DYSPLASIA: Primary | ICD-10-CM

## 2023-01-25 PROCEDURE — 57520 CONIZATION OF CERVIX: CPT | Performed by: OBSTETRICS & GYNECOLOGY

## 2023-01-25 PROCEDURE — 88305 TISSUE EXAM BY PATHOLOGIST: CPT

## 2023-01-25 PROCEDURE — 88307 TISSUE EXAM BY PATHOLOGIST: CPT

## 2023-01-25 PROCEDURE — 52005 CYSTO W/URTRL CATHJ: CPT | Performed by: OBSTETRICS & GYNECOLOGY

## 2023-01-25 RX ORDER — OXYCODONE HYDROCHLORIDE 5 MG/1
5 TABLET ORAL EVERY 4 HOURS PRN
Qty: 5 TABLET | Refills: 0 | Status: SHIPPED | OUTPATIENT
Start: 2023-01-25 | End: 2023-01-27 | Stop reason: SDUPTHER

## 2023-01-25 RX ORDER — DOCUSATE SODIUM 100 MG/1
100 CAPSULE, LIQUID FILLED ORAL 2 TIMES DAILY
Qty: 60 CAPSULE | Refills: 0 | Status: SHIPPED | OUTPATIENT
Start: 2023-01-25

## 2023-01-25 NOTE — TELEPHONE ENCOUNTER
Caller: PACU    Best call back number: [phone]    Type of visit: POST OP    Requested date: 2-3 WEEKS OUT    Additional notes: PLEASE CALL PT ONCE SCHEDULED.

## 2023-01-26 ENCOUNTER — TELEPHONE (OUTPATIENT)
Dept: GYNECOLOGIC ONCOLOGY | Facility: CLINIC | Age: 33
End: 2023-01-26
Payer: MEDICAID

## 2023-01-26 ENCOUNTER — OFFICE VISIT (OUTPATIENT)
Dept: GYNECOLOGIC ONCOLOGY | Facility: CLINIC | Age: 33
End: 2023-01-26
Payer: MEDICAID

## 2023-01-26 VITALS
HEART RATE: 72 BPM | OXYGEN SATURATION: 97 % | SYSTOLIC BLOOD PRESSURE: 123 MMHG | RESPIRATION RATE: 16 BRPM | TEMPERATURE: 97.7 F | WEIGHT: 126 LBS | DIASTOLIC BLOOD PRESSURE: 74 MMHG | HEIGHT: 63 IN | BODY MASS INDEX: 22.32 KG/M2

## 2023-01-26 DIAGNOSIS — Z09 POSTOP CHECK: Primary | ICD-10-CM

## 2023-01-26 DIAGNOSIS — R39.89 BLADDER PAIN: ICD-10-CM

## 2023-01-26 LAB — REF LAB TEST METHOD: NORMAL

## 2023-01-26 PROCEDURE — 99024 POSTOP FOLLOW-UP VISIT: CPT | Performed by: OBSTETRICS & GYNECOLOGY

## 2023-01-26 NOTE — TELEPHONE ENCOUNTER
Patients SO called with some concerns. States patient had surgery yesterday and in order for her to urinate, she is having to strain really hard almost like she is trying to have a bowel movement to get any urine to come out. Also states that 1 of the incisions is really red and painful and also looks like it is trying to come open. Informed that I would send a message back to the clinical team and catarino high priority.

## 2023-01-26 NOTE — TELEPHONE ENCOUNTER
Spoke with pt and per APRN pt needs to come into office this afternoon.  Pt v/u and appreciation.  Dalia GUTIERREZ Is getting pt on schedule.

## 2023-01-27 ENCOUNTER — TELEPHONE (OUTPATIENT)
Dept: GYNECOLOGIC ONCOLOGY | Facility: CLINIC | Age: 33
End: 2023-01-27
Payer: MEDICAID

## 2023-01-27 DIAGNOSIS — N32.89 BLADDER SPASMS: ICD-10-CM

## 2023-01-27 DIAGNOSIS — R30.9 PAINFUL URINATION: Primary | ICD-10-CM

## 2023-01-27 DIAGNOSIS — N87.9 CERVICAL DYSPLASIA: ICD-10-CM

## 2023-01-27 RX ORDER — PROCHLORPERAZINE MALEATE 10 MG
10 TABLET ORAL EVERY 6 HOURS PRN
Qty: 10 TABLET | Refills: 0 | Status: SHIPPED | OUTPATIENT
Start: 2023-01-27

## 2023-01-27 RX ORDER — OXYCODONE HYDROCHLORIDE 5 MG/1
5 TABLET ORAL EVERY 4 HOURS PRN
Qty: 10 TABLET | Refills: 0 | Status: SHIPPED | OUTPATIENT
Start: 2023-01-27

## 2023-01-27 RX ORDER — OXYBUTYNIN CHLORIDE 5 MG/1
5 TABLET ORAL 3 TIMES DAILY
Qty: 30 TABLET | Refills: 0 | Status: SHIPPED | OUTPATIENT
Start: 2023-01-27 | End: 2023-02-02 | Stop reason: SINTOL

## 2023-01-27 NOTE — TELEPHONE ENCOUNTER
Per MD, will send in 10 more tab of Oxycodone and an Rx alternative for Myrbetriq.  Pt v/u and appreciation.

## 2023-01-27 NOTE — TELEPHONE ENCOUNTER
Caller: Georgie Winston    Relationship: Self    Best call back number: [phone]    What is the best time to reach you: ANYTIME     Who are you requesting to speak with (clinical staff, provider,  specific staff member): CLINICAL     What was the call regarding: PT CALLING HER INSURANCE DENIED THE [employer], NEED PRIOR AUTH    ALSO SHE ASKED FOR A REFILL ON THE OXYCODONE 5MG TO BE REFILLED SHE IS COMPLETELY OUT OF PAIN PILLS.    CALL TO DISCUSS    Do you require a callback: YES

## 2023-01-27 NOTE — TELEPHONE ENCOUNTER
Patient called at 5 am with 2 episodes of vomiting. Took zofran about 1.5 hours ago. Nausea pre-dated surgery. Is passing gas. Recommend oral hydration and bland foods as able. Rx for compazine as needed. Instructed to call if refractory nausea or vomiting/inability to tolerate PO.    Electronically signed by Angelica La MD, 01/27/23, 6:25 AM EST.

## 2023-01-30 NOTE — PROGRESS NOTES
"     Post Operative Office Visit      Patient Name: Georgie Winston  : 1990   MRN: [id number]     Chief Complaint:  Postoperative visit    History of Present Illness: Georgie Winston is a 32 y.o. female who is status post diagnostic laparoscopy, cystoscopy and cold knife conization for chronic pain, bladder pain, cervical dysplasia. Her procedure was uncomplicated. However, today her fiance called our office and reported that the patient was having worsening pain and difficulty emptying her bladder. I asked her to come in for evaluation and voiding trial. Georgie reports that after surgery she actually did not have any problems with emptying. Additionally, her pre-existing bladder pain was actually temporarily improved. When she woke up this morning her bladder symptoms returned. She states that she has to forcibly push out her urine and that it feels like she is never truly empty. She states that her bladder pain is greatly affecting her life.     Medical, surgical, and family history updated as needed.  Subjective   ROS as per HPI    Objective     Physical Exam:  Vital Signs:   Vitals:    23 1530   BP: 123/74  Comment: RUE   Pulse: 72   Resp: 16   Temp: 97.7 °F (36.5 °C)   TempSrc: Infrared   SpO2: 97%  Comment: RA   Weight: 57.2 kg (126 lb)   Height: 160 cm (63\")   PainSc:   7     BMI: Body mass index is 22.32 kg/m².     Physical Examination:   General appearance - alert, well appearing, and in no distress and oriented to person, place, and time  Mental status - normal mood, behavior, speech, dress, motor activity, and thought processes  Lungs - normal respiratory effort, clear to auscultation  Heart - normal rate, regular rhythm, normal S1, S2, no murmurs, rubs, clicks or gallops  Abdomen - soft, nontender, nondistended, no masses or organomegaly, incision C/D/I and without an evidence of infection  Pelvic - deferred  Neurological - alert, oriented, normal speech, no focal findings or " "movement disorder noted  Musculoskeletal - no joint tenderness, deformity or swelling  Extremities - peripheral pulses normal, no pedal edema, no clubbing or cyanosis  Skin - normal coloration and turgor, no rashes, no suspicious skin lesions noted     Medications:     Current Outpatient Medications:   •  docusate sodium (COLACE) 100 MG capsule, Take 1 capsule by mouth 2 (Two) Times a Day., Disp: 60 capsule, Rfl: 0  •  Levonorgest-Eth Estrad 91-Day 0.15-0.03 &0.01 MG tablet, Take 1 tablet by mouth Daily., Disp: , Rfl:   •  Prenatal Vit-Fe Fumarate-FA (PRENATAL, CLASSIC, VITAMIN) 28-0.8 MG tablet tablet, Take  by mouth Daily., Disp: , Rfl:   •  oxybutynin (DITROPAN) 5 MG tablet, Take 1 tablet by mouth 3 (Three) Times a Day., Disp: 30 tablet, Rfl: 0  •  oxyCODONE (Roxicodone) 5 MG immediate release tablet, Take 1 tablet by mouth Every 4 (Four) Hours As Needed for Moderate Pain., Disp: 10 tablet, Rfl: 0  •  prochlorperazine (COMPAZINE) 10 MG tablet, Take 1 tablet by mouth Every 6 (Six) Hours As Needed for Nausea or Vomiting., Disp: 10 tablet, Rfl: 0  Current outpatient and discharge medications have been reconciled for the patient.  Reviewed by: Angelica La MD      Allergies:   Allergies   Allergen Reactions   • Contrast Dye (Echo Or Unknown Ct/Mr) Anaphylaxis     Premedication for CT scan with prednisone, Benadryl, and Tagamet did not work for patient, she still had hives and swelling after contrast.   • Shellfish Allergy Anaphylaxis   • Shellfish-Derived Products Anaphylaxis   • Amoxicillin Other (See Comments)     Yeast inf   • Ciprofloxacin Hcl Mental Status Change     Changes personality \"altered state of mind; I become violent & can't control my actions.\"   • Erythromycin Diarrhea and Nausea And Vomiting   • Gabapentin GI Intolerance   • Zithromax [Azithromycin] Nausea And Vomiting     NVD   • Demerol [Meperidine] Hives     IV after surgery   • Flagyl [Metronidazole] Rash       Pathology:   A.   CUL DE SAC, " NODULE: Organized fat necrosis with calcification.   B.   CERVIX,CONIZATION:   High-grade squamous intraepithelial lesion (severe squamous dysplasia/PERCY-3)   Surgical margins negative for dysplasia.   C.   ENDOCERVIX, CURETTINGS:   Detached strips of benign glandular epithelium in a background of mucin.     Operative findings again reviewed. Pathology report discussed.       Assessment / Plan    Georgie Winston is s/p  diagnostic laparoscopy, cystoscopy and cold knife conization for chronic pain, bladder pain, cervical dysplasia. Her procedure was uncomplicated. We reviewed her pathology which showed PERCY III but negative margins. There was no invasive malingancy. She will need to follow up with her OB/GYN for repeat pap in 4-6 months for continued evaluation.    In terms of her bladder symptoms, these pre-date surgery. I again reviewed my suspicion for IC/painful bladder syndrome. We did perform a voiding trial today. The patient was able to avoid about 250 cc with < 50 cc of urine retained on straight cath. Therefore, the catheter was removed. I did reach out to urology. They were kind enough to move the patient's appointment up given her significant symptoms. In the meantime I did give her a trial of oxybutynin for possible bladder spasms.  I am hopefully that with appropriate evaluation and management of her  symptoms that her pelvic pain will improve.     I will remain available to the patient if any questions or concerns arise.    Follow Up:   No follow-ups on file.    SOLO La MD  Gynecologic Oncology

## 2023-02-01 ENCOUNTER — TELEPHONE (OUTPATIENT)
Dept: GYNECOLOGIC ONCOLOGY | Facility: CLINIC | Age: 33
End: 2023-02-01
Payer: MEDICAID

## 2023-02-01 NOTE — TELEPHONE ENCOUNTER
Discussed with Dr. La.  Discharge, bleeding and odor are normal for up 4 weeks after procedure.  Patient notified and verified understanding.

## 2023-02-01 NOTE — TELEPHONE ENCOUNTER
Patient called with complaints of discharge and an odor. She would like to speak with someone to see if this is normal.

## 2023-02-02 ENCOUNTER — TELEPHONE (OUTPATIENT)
Dept: UROLOGY | Facility: CLINIC | Age: 33
End: 2023-02-02

## 2023-02-02 ENCOUNTER — OFFICE VISIT (OUTPATIENT)
Dept: UROLOGY | Facility: CLINIC | Age: 33
End: 2023-02-02
Payer: MEDICAID

## 2023-02-02 VITALS
WEIGHT: 126 LBS | HEIGHT: 63 IN | RESPIRATION RATE: 20 BRPM | TEMPERATURE: 97.7 F | DIASTOLIC BLOOD PRESSURE: 74 MMHG | OXYGEN SATURATION: 98 % | SYSTOLIC BLOOD PRESSURE: 116 MMHG | BODY MASS INDEX: 22.32 KG/M2 | HEART RATE: 87 BPM

## 2023-02-02 DIAGNOSIS — N32.81 OAB (OVERACTIVE BLADDER): ICD-10-CM

## 2023-02-02 DIAGNOSIS — R30.9 PAINFUL URINATION: ICD-10-CM

## 2023-02-02 DIAGNOSIS — N30.10 IC (INTERSTITIAL CYSTITIS): Primary | ICD-10-CM

## 2023-02-02 LAB
BILIRUB BLD-MCNC: NEGATIVE MG/DL
CLARITY, POC: CLEAR
COLOR UR: YELLOW
EXPIRATION DATE: ABNORMAL
GLUCOSE UR STRIP-MCNC: NEGATIVE MG/DL
KETONES UR QL: NEGATIVE
LEUKOCYTE EST, POC: ABNORMAL
Lab: ABNORMAL
NITRITE UR-MCNC: NEGATIVE MG/ML
PH UR: 6 [PH] (ref 5–8)
PROT UR STRIP-MCNC: ABNORMAL MG/DL
RBC # UR STRIP: ABNORMAL /UL
SP GR UR: 1.02 (ref 1–1.03)
UROBILINOGEN UR QL: NORMAL

## 2023-02-02 PROCEDURE — 51700 IRRIGATION OF BLADDER: CPT | Performed by: NURSE PRACTITIONER

## 2023-02-02 PROCEDURE — 99214 OFFICE O/P EST MOD 30 MIN: CPT | Performed by: NURSE PRACTITIONER

## 2023-02-02 PROCEDURE — 51798 US URINE CAPACITY MEASURE: CPT | Performed by: NURSE PRACTITIONER

## 2023-02-02 RX ORDER — AMITRIPTYLINE HYDROCHLORIDE 10 MG/1
10 TABLET, FILM COATED ORAL NIGHTLY
Qty: 30 TABLET | Refills: 11 | Status: SHIPPED | OUTPATIENT
Start: 2023-02-02

## 2023-02-02 RX ORDER — NORETHINDRONE ACETATE AND ETHINYL ESTRADIOL 1MG-20(21)
1 KIT ORAL DAILY
COMMUNITY
Start: 2022-11-06

## 2023-02-02 NOTE — PROGRESS NOTES
Patient was cleaned off given numbing jelly and a catheter was placed injecting Heparin 10ml, kenalog 40mg 20ml Bupivacine HVL and 25 ml 8.4 sodium catheter was in and out, patient tolerated well office supplied medication.  CD,CMA

## 2023-02-02 NOTE — TELEPHONE ENCOUNTER
Called patient to let her know that her insurance did not require a PA for myrbetriq she was prescribed so I told her if she goes to  and it is too expensive to give us a call and we can continue the PA for it.     CD,CMA

## 2023-02-02 NOTE — PROGRESS NOTES
Office Visit General Female New      Patient Name: Georgie Winston  : 1990   MRN: [id number]     Chief Complaint:   Chief Complaint   Patient presents with   • Pelvic Pain     Painful urination         Referring Provider: Moon Lam, *    History of Present Illness: Ms. Winston is a 32 y.o. female with below past medical history who presents with chronic pelvic pain and painful urination.  When she was 11 she was diagnosed with IC and afterwards when she was 16 a different provider told her she was too young and was not going to give her that diagnosis.  She recently had diagnostic laparoscopy, colposcopy and cystoscopy with Dr. La on 23.  Patient complains of urinary frequency, urgency, and nocturia with urge incontinence.  She also complains of bladder pain and dysuria as well as this is causing pain that radiates into her hips and down her legs.      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   • Panic attack    • Parsonage-Lozano syndrome     left shoulder   • Thoracic nerve injury     left   • Urinary tract infection        Past Surgical History:   Past Surgical History:   Procedure Laterality Date   • ADENOIDECTOMY     • CERVICAL CONE BIOPSY  2023   •  SECTION N/A 2017    Procedure:  SECTION PRIMARY;  Surgeon: Antione Vazquez MD;  Location: [facility] LABOR DELIVERY;  Service:    • ENDOSCOPY AND COLONOSCOPY     •  TONSILLECTOMY     • TONSILLECTOMY Bilateral    • UMBILICAL HERNIA REPAIR     • UMBILICAL HERNIA REPAIR     • WISDOM TOOTH EXTRACTION         Family History:   Family History   Problem Relation Age of Onset   • Hypertension Mother    • Diabetes Mother    • Colon polyps Mother    • Hypertension Father    • Diabetes Father    • Heart attack Father    • Alcohol abuse Maternal Uncle    • Liver disease Maternal Uncle    • Colon cancer Neg Hx    • Liver cancer Neg Hx    • Stomach cancer Neg Hx    • Esophageal cancer Neg Hx        Social History:   Social History     Socioeconomic History   • Marital status: Single   Tobacco Use   • Smoking status: Former     Packs/day: 0.50     Years: 3.00     Pack years: 1.50     Types: Cigarettes     Quit date: 2016     Years since quittin.1   • Smokeless tobacco: Never   • Tobacco comments:     quit 2016   Vaping Use   • Vaping Use: Former   • Substances: Nicotine, Flavoring   • Devices: Pre-filled or refillable cartridge   Substance and Sexual Activity   • Alcohol use: Yes     Comment: RARE   • Drug use: No   • Sexual activity: Yes     Partners: Male     Birth control/protection: OCP       Medications:     Current Outpatient Medications:   •  amitriptyline (ELAVIL) 10 MG tablet, Take 1 tablet by mouth Every Night., Disp: 30 tablet, Rfl: 11  •  Blisovi FE  1-20 MG-MCG per tablet, Take 1 tablet by mouth Daily., Disp: , Rfl:   •  docusate sodium (COLACE) 100 MG capsule, Take 1 capsule by mouth 2 (Two) Times a Day., Disp: 60 capsule, Rfl: 0  •  Levonorgest-Eth Estrad 91-Day 0.15-0.03 &0.01 MG tablet, Take 1 tablet by mouth Daily., Disp: , Rfl:   •  Mirabegron ER (Myrbetriq) 25 MG tablet sustained-release 24 hour 24 hr tablet, Take 1 tablet by mouth Daily., Disp: 30 tablet, Rfl: 11  •  oxyCODONE (Roxicodone) 5 MG immediate release tablet, Take 1 tablet by mouth Every 4 (Four) Hours As Needed for Moderate Pain., Disp: 10 tablet, Rfl: 0  •  Prenatal Vit-Fe  "Fumarate-FA (PRENATAL, CLASSIC, VITAMIN) 28-0.8 MG tablet tablet, Take  by mouth Daily., Disp: , Rfl:   •  prochlorperazine (COMPAZINE) 10 MG tablet, Take 1 tablet by mouth Every 6 (Six) Hours As Needed for Nausea or Vomiting., Disp: 10 tablet, Rfl: 0  •  sertraline (ZOLOFT) 50 MG tablet, Take 1 tablet by mouth Daily., Disp: , Rfl:     Current Facility-Administered Medications:   •  heparin (porcine) 10,000 Units, sodium bicarbonate 8.4 % 25 mL, triamcinolone acetonide (KENALOG-40) 40 mg, bupivacaine (MARCAINE) 25 mL irrigation, , Intracatheter, Once, Chandan Alexander APRN    Allergies:   Allergies   Allergen Reactions   • Contrast Dye (Echo Or Unknown Ct/Mr) Anaphylaxis     Premedication for CT scan with prednisone, Benadryl, and Tagamet did not work for patient, she still had hives and swelling after contrast.   • Shellfish Allergy Anaphylaxis   • Shellfish-Derived Products Anaphylaxis   • Amoxicillin Other (See Comments)     Yeast inf   • Ciprofloxacin Hcl Mental Status Change     Changes personality \"altered state of mind; I become violent & can't control my actions.\"   • Erythromycin Diarrhea and Nausea And Vomiting   • Gabapentin GI Intolerance   • Zithromax [Azithromycin] Nausea And Vomiting     NVD   • Demerol [Meperidine] Hives     IV after surgery   • Flagyl [Metronidazole] Rash       Objective     Physical Exam:   Vital Signs:   Vitals:    02/02/23 0915   BP: 116/74   BP Location: Right arm   Patient Position: Sitting   Cuff Size: Adult   Pulse: 87   Resp: 20   Temp: 97.7 °F (36.5 °C)   TempSrc: Temporal   SpO2: 98%   Weight: 57.2 kg (126 lb)   Height: 160 cm (62.99\")     Body mass index is 22.33 kg/m².     Constitutional: NAD, WDWN.   Neurological: A + O x 3  Psychiatric:  Normal mood and affect    Labs  Brief Urine Lab Results  (Last result in the past 365 days)      Color   Clarity   Blood   Leuk Est   Nitrite   Protein   CREAT   Urine HCG        02/02/23 0926 Yellow   Clear   3+   Small (1+)   " Negative   Trace                      Lab Results   Component Value Date    GLUCOSE 87 01/05/2023    CALCIUM 9.1 01/05/2023     01/05/2023    K 4.2 01/05/2023    CO2 24.9 01/05/2023     01/05/2023    BUN 9 01/05/2023    CREATININE 0.95 01/05/2023    EGFRIFNONA 120 06/10/2017    BCR 9.5 01/05/2023    ANIONGAP 11.1 01/05/2023       Lab Results   Component Value Date    WBC 6.20 01/05/2023    HGB 13.9 01/05/2023    HCT 40.4 01/05/2023    MCV 91.4 01/05/2023     01/05/2023     PVR  Post-void residual performed with ultrasound scanner by staff and interpreted by bhavik garay      Assessment / Plan      .Assessment  Ms. Winston is a 32 y.o. female with history of endometriosis, fibromyalgia, and back pain here for interstitial cystitis and OAB.  We discussed treatment options for interstitial cystitis including an office bladder cocktail, PF PT, and oral medication options.  For OAB patient has trialed oxybutynin and failed this medication due to side effects as well as it is offered no improvements in urinary frequency and urgency.  We discussed treatment options including beta agonist medications, bladder Botox twice a year, and InterStim.  With having a flare of IC I would recommend trial of medications or InterStim as bladder Botox will sometimes increase IC flares.   UA today 3+ blood this is likely related to recent colposcopy will repeat UA in 1 month to confirm has resolved    Plan  1.  OAB          -Trial Myrbetriq 25 mg daily and discontinue oxybutynin   2.  IC          -Start amitriptyline 10 mg nightly we have discussed SE risks          -Schedule PF PT with BB in physical therapy in [place] per patient request          -In office bladder cocktail performed today    Follow Up:   Return in about 1 month (around 3/2/2023) for Follow up ROD Roper, NP-C  [facility]

## 2023-02-08 ENCOUNTER — TELEPHONE (OUTPATIENT)
Dept: UROLOGY | Facility: CLINIC | Age: 33
End: 2023-02-08
Payer: MEDICAID

## 2023-02-08 NOTE — TELEPHONE ENCOUNTER
Left patient a message regarding her PA, that It as denied due to not having those records yet from her other doctor, once we get those we can file a appeal and she should have enough samples until then    CD,CMA

## 2023-02-10 ENCOUNTER — TELEPHONE (OUTPATIENT)
Dept: UROLOGY | Facility: CLINIC | Age: 33
End: 2023-02-10
Payer: MEDICAID

## 2023-02-10 NOTE — TELEPHONE ENCOUNTER
Patient called wanting to be scheduled for a bladder cocktail, Per rolando     Also discussed her PA, She understood we are waiting for her records to send in for a appeal.    CD,CMA

## 2023-02-13 ENCOUNTER — PROCEDURE VISIT (OUTPATIENT)
Dept: UROLOGY | Facility: CLINIC | Age: 33
End: 2023-02-13
Payer: MEDICAID

## 2023-02-13 ENCOUNTER — TELEPHONE (OUTPATIENT)
Dept: UROLOGY | Facility: CLINIC | Age: 33
End: 2023-02-13
Payer: MEDICAID

## 2023-02-13 ENCOUNTER — TELEPHONE (OUTPATIENT)
Dept: GYNECOLOGIC ONCOLOGY | Facility: CLINIC | Age: 33
End: 2023-02-13

## 2023-02-13 DIAGNOSIS — N30.10 IC (INTERSTITIAL CYSTITIS): Primary | ICD-10-CM

## 2023-02-13 DIAGNOSIS — N36.8 OTHER SPECIFIED DISORDERS OF URETHRA: Primary | ICD-10-CM

## 2023-02-13 LAB
BILIRUB BLD-MCNC: NEGATIVE MG/DL
CLARITY, POC: CLEAR
COLOR UR: YELLOW
EXPIRATION DATE: ABNORMAL
GLUCOSE UR STRIP-MCNC: NEGATIVE MG/DL
KETONES UR QL: NEGATIVE
LEUKOCYTE EST, POC: ABNORMAL
Lab: ABNORMAL
NITRITE UR-MCNC: NEGATIVE MG/ML
PH UR: 6 [PH] (ref 5–8)
PROT UR STRIP-MCNC: ABNORMAL MG/DL
RBC # UR STRIP: ABNORMAL /UL
SP GR UR: 1.01 (ref 1–1.03)
UROBILINOGEN UR QL: NORMAL

## 2023-02-13 PROCEDURE — 81003 URINALYSIS AUTO W/O SCOPE: CPT | Performed by: UROLOGY

## 2023-02-13 PROCEDURE — 51700 IRRIGATION OF BLADDER: CPT | Performed by: UROLOGY

## 2023-02-13 RX ORDER — PHENAZOPYRIDINE HYDROCHLORIDE 200 MG/1
200 TABLET, FILM COATED ORAL 3 TIMES DAILY PRN
Qty: 20 TABLET | Refills: 0 | Status: SHIPPED | OUTPATIENT
Start: 2023-02-13

## 2023-02-13 NOTE — TELEPHONE ENCOUNTER
Caller: Georgie Winston    Relationship: Self    Best call back number: [phone]    What is the best time to reach you: ANYTIME    Who are you requesting to speak with (clinical staff, provider,  specific staff member): CLINICAL     What was the call regarding: PT IS STILL BLEEDING WITH SOME CLOTS.    ALSO NO POST OP APPT HAS BEEN BEV WHEN DOES SHE NEED TO COME IN?    CALL TO [facility]    Do you require a callback: YES

## 2023-02-13 NOTE — TELEPHONE ENCOUNTER
This patient in office today brittany bladder cocktail. She states you were going to send in rx for pyridium to [facility].  Yolis,CMA

## 2023-02-13 NOTE — TELEPHONE ENCOUNTER
RN called patient back and clarified the schedule change of post -op visit. Also answered questions about post-op bleeding. Pt verbalized understanding.

## 2023-02-14 NOTE — PROGRESS NOTES
Patient in office for bladder cocktail mixture of heparin,lidocaine,kenalog,and sodium bicarb. Medications were mixed in sterile container and given to patient directly via catheter. She tolerated well no complications noted in office.    STAN Lagos

## 2023-02-14 NOTE — TELEPHONE ENCOUNTER
Patient advised. Says pyridium was too expensive but she was able to get azo instead and it helped her.  MARGUERITE Lagos

## 2023-03-03 ENCOUNTER — OFFICE VISIT (OUTPATIENT)
Dept: UROLOGY | Facility: CLINIC | Age: 33
End: 2023-03-03
Payer: MEDICAID

## 2023-03-03 VITALS
HEART RATE: 96 BPM | TEMPERATURE: 98.8 F | HEIGHT: 63 IN | WEIGHT: 119.4 LBS | RESPIRATION RATE: 16 BRPM | SYSTOLIC BLOOD PRESSURE: 116 MMHG | BODY MASS INDEX: 21.16 KG/M2 | DIASTOLIC BLOOD PRESSURE: 66 MMHG | OXYGEN SATURATION: 97 %

## 2023-03-03 DIAGNOSIS — N30.10 IC (INTERSTITIAL CYSTITIS): Primary | ICD-10-CM

## 2023-03-03 DIAGNOSIS — N32.81 OAB (OVERACTIVE BLADDER): ICD-10-CM

## 2023-03-03 LAB
BILIRUB BLD-MCNC: NEGATIVE MG/DL
CLARITY, POC: CLEAR
COLOR UR: YELLOW
EXPIRATION DATE: ABNORMAL
GLUCOSE UR STRIP-MCNC: NEGATIVE MG/DL
KETONES UR QL: NEGATIVE
LEUKOCYTE EST, POC: NEGATIVE
Lab: ABNORMAL
NITRITE UR-MCNC: NEGATIVE MG/ML
PH UR: 6 [PH] (ref 5–8)
PROT UR STRIP-MCNC: NEGATIVE MG/DL
RBC # UR STRIP: ABNORMAL /UL
SP GR UR: 1.01 (ref 1–1.03)
UROBILINOGEN UR QL: NORMAL

## 2023-03-03 PROCEDURE — 99214 OFFICE O/P EST MOD 30 MIN: CPT | Performed by: NURSE PRACTITIONER

## 2023-03-03 PROCEDURE — 51700 IRRIGATION OF BLADDER: CPT | Performed by: NURSE PRACTITIONER

## 2023-03-03 RX ORDER — FESOTERODINE FUMARATE 4 MG/1
4 TABLET, EXTENDED RELEASE ORAL
Qty: 30 TABLET | Refills: 3 | Status: SHIPPED | OUTPATIENT
Start: 2023-03-03

## 2023-03-03 NOTE — PROGRESS NOTES
Office Visit General Established Female Patient     Patient Name: Georgie Winston  : 1990   MRN: [id number]     Chief Complaint:   Chief Complaint   Patient presents with   • interstitial cystitis     Follow up       Referring Provider: No ref. provider found    History of Present Illness: Georgie Winston is a 33 y.o. female with history below in assessment, who presents for follow up.  Patient recently had bladder cocktail for IC and was going to trial Myrbetriq for her OAB.  We could not get Myrbetriq covered with her health insurance so she has not started the medicine.  She has realized that she can take caffeine out of her diet and her bladder symptoms have improved.  She does report she has felt much better taking amitriptyline but it is causing her excessive fatigue and would like to know how she can combat this.      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   • Panic attack 2013   • Parsonage-Lozano syndrome     left shoulder   • Thoracic nerve injury     left   • Urinary tract infection        Past Surgical History:   Past Surgical History:   Procedure Laterality Date   • ADENOIDECTOMY     • CERVICAL CONE BIOPSY  2023   •  SECTION N/A 2017    Procedure:  SECTION PRIMARY;  Surgeon: Antione Vazquez MD;  Location: [facility] LABOR DELIVERY;  Service:    • ENDOSCOPY AND COLONOSCOPY     • TONSILLECTOMY     • TONSILLECTOMY Bilateral    • UMBILICAL  HERNIA REPAIR     • UMBILICAL HERNIA REPAIR     • WISDOM TOOTH EXTRACTION         Family History:   Family History   Problem Relation Age of Onset   • Hypertension Mother    • Diabetes Mother    • Colon polyps Mother    • Hypertension Father    • Diabetes Father    • Heart attack Father    • Alcohol abuse Maternal Uncle    • Liver disease Maternal Uncle    • Colon cancer Neg Hx    • Liver cancer Neg Hx    • Stomach cancer Neg Hx    • Esophageal cancer Neg Hx        Social History:   Social History     Socioeconomic History   • Marital status: Single   Tobacco Use   • Smoking status: Former     Packs/day: 0.50     Years: 3.00     Pack years: 1.50     Types: Cigarettes     Quit date: 2016     Years since quittin.2   • Smokeless tobacco: Never   • Tobacco comments:     quit 2016   Vaping Use   • Vaping Use: Former   • Substances: Nicotine, Flavoring   • Devices: Pre-filled or refillable cartridge   Substance and Sexual Activity   • Alcohol use: Yes     Comment: RARE   • Drug use: No   • Sexual activity: Yes     Partners: Male     Birth control/protection: OCP       Medications:     Current Outpatient Medications:   •  amitriptyline (ELAVIL) 10 MG tablet, Take 1 tablet by mouth Every Night., Disp: 30 tablet, Rfl: 11  •  Blisovi FE  1-20 MG-MCG per tablet, Take 1 tablet by mouth Daily., Disp: , Rfl:   •  docusate sodium (COLACE) 100 MG capsule, Take 1 capsule by mouth 2 (Two) Times a Day., Disp: 60 capsule, Rfl: 0  •  oxyCODONE (Roxicodone) 5 MG immediate release tablet, Take 1 tablet by mouth Every 4 (Four) Hours As Needed for Moderate Pain., Disp: 10 tablet, Rfl: 0  •  phenazopyridine (Pyridium) 200 MG tablet, Take 1 tablet by mouth 3 (Three) Times a Day As Needed for Bladder Spasms., Disp: 20 tablet, Rfl: 0  •  Prenatal Vit-Fe Fumarate-FA (PRENATAL, CLASSIC, VITAMIN) 28-0.8 MG tablet tablet, Take  by mouth Daily., Disp: , Rfl:   •  prochlorperazine (COMPAZINE) 10 MG tablet, Take 1 tablet by  "mouth Every 6 (Six) Hours As Needed for Nausea or Vomiting., Disp: 10 tablet, Rfl: 0  •  fesoterodine fumarate (Toviaz) 4 MG tablet sustained-release 24 hour tablet, Take 1 tablet by mouth Daily., Disp: 30 tablet, Rfl: 3  •  Levonorgest-Eth Estrad 91-Day 0.15-0.03 &0.01 MG tablet, Take 1 tablet by mouth Daily., Disp: , Rfl:     Allergies:   Allergies   Allergen Reactions   • Contrast Dye (Echo Or Unknown Ct/Mr) Anaphylaxis     Premedication for CT scan with prednisone, Benadryl, and Tagamet did not work for patient, she still had hives and swelling after contrast.   • Shellfish Allergy Anaphylaxis   • Shellfish-Derived Products Anaphylaxis   • Amoxicillin Other (See Comments)     Yeast inf   • Ciprofloxacin Hcl Mental Status Change     Changes personality \"altered state of mind; I become violent & can't control my actions.\"   • Erythromycin Diarrhea and Nausea And Vomiting   • Gabapentin GI Intolerance   • Zithromax [Azithromycin] Nausea And Vomiting     NVD   • Demerol [Meperidine] Hives     IV after surgery   • Flagyl [Metronidazole] Rash       Objective     Physical Exam:   Vital Signs:   Visit Vitals  /66 (BP Location: Left arm, Patient Position: Sitting, Cuff Size: Adult)   Pulse 96   Temp 98.8 °F (37.1 °C) (Temporal)   Resp 16   Ht 160 cm (62.99\")   Wt 54.2 kg (119 lb 6.4 oz)   SpO2 97%   BMI 21.16 kg/m²      Body mass index is 21.16 kg/m².     Constitutional: NAD, WDWN.   Neurological: A + O x 3   Psychiatric:  Normal mood and affect    Labs  Brief Urine Lab Results  (Last result in the past 365 days)      Color   Clarity   Blood   Leuk Est   Nitrite   Protein   CREAT   Urine HCG        03/03/23 0913 Yellow   Clear   3+   Negative   Negative   Negative                 Lab Results   Component Value Date    GLUCOSE 87 01/05/2023    CALCIUM 9.1 01/05/2023     01/05/2023    K 4.2 01/05/2023    CO2 24.9 01/05/2023     01/05/2023    BUN 9 01/05/2023    CREATININE 0.95 01/05/2023    EGFRIFNONA 120 " 06/10/2017    BCR 9.5 01/05/2023    ANIONGAP 11.1 01/05/2023       Lab Results   Component Value Date    WBC 6.20 01/05/2023    HGB 13.9 01/05/2023    HCT 40.4 01/05/2023    MCV 91.4 01/05/2023     01/05/2023           I have reviewed the above labs.     Assessment / Plan      Assessment/Plan:   Diagnoses and all orders for this visit:    1. IC (interstitial cystitis) (Primary)  -     POC Urinalysis Dipstick, Automated    2. OAB (overactive bladder)  -     fesoterodine fumarate (Toviaz) 4 MG tablet sustained-release 24 hour tablet; Take 1 tablet by mouth Daily.  Dispense: 30 tablet; Refill: 3    33-year-old female with a history of endometriosis and IBS following up with IC and OAB.  After taking amitriptyline and doing bladder cocktails in the office her bladder symptoms have improved, as well as she is working on an elimination diet and has been able to identify triggers that aggravate the bladder.  Insurance would not cover Myrbetriq she has not trialed the samples.  In the past she did try oxybutynin and failed this due to side effects causing mental status changes for her.  We discussed trying a different anticholinergic medication but does have the same risk of side effects.  She is agreed to trial Toviaz.  For side effects with amitriptyline we discussed she could try taking a half a tablet nightly opposed to the full 10 mg to see if this will decrease her fatigue.  Patient was given a care pathway and discussed InterStim in office today and information packet given.    1. Trial amitriptyline half a tablet nightly  2. Bladder cocktail in office today  3. Start Toviaz 4 mg daily    Follow Up:   Return in about 3 months (around 6/3/2023) for Follow up Chandan.    ROD Bucio, NP-C  [facility] [place]

## 2023-03-03 NOTE — PROGRESS NOTES
Patient was given 10mL Heparin, 40mg kenalog, 20ml lidocaine, & 25ml 8.4 sodium bicarbonate office supplied medication patient tolerated well no issues or concerns she laid for 15-20 minutes. After she laid for her time she stated she got dizzy after  CD,CMA

## 2023-04-05 ENCOUNTER — TELEPHONE (OUTPATIENT)
Dept: GYNECOLOGIC ONCOLOGY | Facility: CLINIC | Age: 33
End: 2023-04-05
Payer: MEDICAID

## 2023-04-05 DIAGNOSIS — N87.9 CERVICAL DYSPLASIA: ICD-10-CM

## 2023-04-05 DIAGNOSIS — N93.9 ABNORMAL UTERINE BLEEDING: Primary | ICD-10-CM

## 2023-04-05 NOTE — TELEPHONE ENCOUNTER
Caller: Georgie Winston    Relationship: Self    Best call back number: [phone]  What is the best time to reach you: ANYTIME . LEAVE VM IF NEEDED.  Who are you requesting to speak with (clinical staff, provider,  specific staff member): REFERRAL REQUEST.      What was the call regarding:PATIENT REQUESTS A REFERRAL FROM DR CHA TO ANOTHER GYNOCOLOGIST. PATIENT SAID ITS DR CHA'S PERSONAL GYNOCOLOGIST    Do you require a callback: NO

## 2023-05-02 ENCOUNTER — PROCEDURE VISIT (OUTPATIENT)
Dept: UROLOGY | Facility: CLINIC | Age: 33
End: 2023-05-02
Payer: MEDICAID

## 2023-05-02 VITALS — TEMPERATURE: 97.9 F | WEIGHT: 119.4 LBS | HEIGHT: 63 IN | BODY MASS INDEX: 21.16 KG/M2

## 2023-05-02 DIAGNOSIS — N39.0 URINARY TRACT INFECTION WITHOUT HEMATURIA, SITE UNSPECIFIED: ICD-10-CM

## 2023-05-02 DIAGNOSIS — N30.10 INTERSTITIAL CYSTITIS: Primary | ICD-10-CM

## 2023-05-02 DIAGNOSIS — R39.9 LOWER URINARY TRACT SYMPTOMS (LUTS): ICD-10-CM

## 2023-05-02 LAB
BILIRUB BLD-MCNC: ABNORMAL MG/DL
CLARITY, POC: ABNORMAL
COLOR UR: ABNORMAL
EXPIRATION DATE: ABNORMAL
GLUCOSE UR STRIP-MCNC: ABNORMAL MG/DL
KETONES UR QL: ABNORMAL
LEUKOCYTE EST, POC: ABNORMAL
Lab: ABNORMAL
NITRITE UR-MCNC: POSITIVE MG/ML
PH UR: 6.5 [PH] (ref 5–8)
PROT UR STRIP-MCNC: ABNORMAL MG/DL
RBC # UR STRIP: NEGATIVE /UL
SP GR UR: 1.01 (ref 1–1.03)
UROBILINOGEN UR QL: ABNORMAL

## 2023-05-02 RX ADMIN — TRIAMCINOLONE ACETONIDE 40 MG: 40 INJECTION, SUSPENSION INTRA-ARTICULAR; INTRAMUSCULAR at 08:59

## 2023-05-04 LAB
BACTERIA UR CULT: NO GROWTH
BACTERIA UR CULT: NORMAL

## 2023-05-10 RX ORDER — TRIAMCINOLONE ACETONIDE 40 MG/ML
40 INJECTION, SUSPENSION INTRA-ARTICULAR; INTRAMUSCULAR ONCE
Status: SHIPPED | OUTPATIENT
Start: 2023-05-10

## 2023-05-10 RX ORDER — TRIAMCINOLONE ACETONIDE 40 MG/ML
40 INJECTION, SUSPENSION INTRA-ARTICULAR; INTRAMUSCULAR ONCE
Status: COMPLETED | OUTPATIENT
Start: 2023-05-02 | End: 2023-05-02

## 2023-05-10 NOTE — PROGRESS NOTES
"Patient was given IC cocktail via catheter. Patient was prepped with iodine and lidacaine gel and 14\" catheter placed and a bladder cocktail was inserted into catheter. Patient waited 15 mintues in supine position and instructed to hold urine for 45 minutes afterwards. Patient tolerated cocktail without complications or issues.   "

## 2023-06-13 PROBLEM — Z01.419 WELL WOMAN EXAM: Status: ACTIVE | Noted: 2023-06-13

## 2023-06-14 ENCOUNTER — OFFICE VISIT (OUTPATIENT)
Dept: OBSTETRICS AND GYNECOLOGY | Facility: CLINIC | Age: 33
End: 2023-06-14
Payer: MEDICAID

## 2023-06-14 VITALS
WEIGHT: 130 LBS | BODY MASS INDEX: 23.04 KG/M2 | DIASTOLIC BLOOD PRESSURE: 80 MMHG | SYSTOLIC BLOOD PRESSURE: 120 MMHG | HEIGHT: 63 IN

## 2023-06-14 DIAGNOSIS — Z11.3 ROUTINE SCREENING FOR STI (SEXUALLY TRANSMITTED INFECTION): ICD-10-CM

## 2023-06-14 DIAGNOSIS — Z86.19 HISTORY OF HERPES GENITALIS: ICD-10-CM

## 2023-06-14 DIAGNOSIS — N92.1 BREAKTHROUGH BLEEDING ON OCPS: ICD-10-CM

## 2023-06-14 DIAGNOSIS — Z01.419 WELL WOMAN EXAM WITH ROUTINE GYNECOLOGICAL EXAM: Primary | ICD-10-CM

## 2023-06-14 DIAGNOSIS — G89.29 CHRONIC PELVIC PAIN IN FEMALE: ICD-10-CM

## 2023-06-14 DIAGNOSIS — N80.9 ENDOMETRIOSIS: ICD-10-CM

## 2023-06-14 DIAGNOSIS — R10.2 CHRONIC PELVIC PAIN IN FEMALE: ICD-10-CM

## 2023-06-14 DIAGNOSIS — N30.10 INTERSTITIAL CYSTITIS: ICD-10-CM

## 2023-06-14 LAB
B-HCG UR QL: NEGATIVE
EXPIRATION DATE: NORMAL
INTERNAL NEGATIVE CONTROL: NEGATIVE
INTERNAL POSITIVE CONTROL: POSITIVE
Lab: NORMAL

## 2023-06-14 RX ORDER — LEVONORGESTREL / ETHINYL ESTRADIOL AND ETHINYL ESTRADIOL 150-30(84)
1 KIT ORAL DAILY
Qty: 91 EACH | Refills: 4 | Status: SHIPPED | OUTPATIENT
Start: 2023-06-14

## 2023-06-14 RX ORDER — VALACYCLOVIR HYDROCHLORIDE 1 G/1
1000 TABLET, FILM COATED ORAL DAILY
Qty: 30 TABLET | Refills: 12 | Status: SHIPPED | OUTPATIENT
Start: 2023-06-14

## 2023-06-14 RX ORDER — LEVONORGESTREL / ETHINYL ESTRADIOL AND ETHINYL ESTRADIOL 150-30(84)
KIT ORAL
COMMUNITY
End: 2023-06-14 | Stop reason: SDUPTHER

## 2023-06-14 NOTE — PROGRESS NOTES
Subjective   Chief Complaint   Patient presents with   • Establish Care     Needs pap smear     Georgie Winston is a 33 y.o. year old  presenting to be seen for her annual exam.     SEXUAL Hx:  She is currently sexually active.  In the past year there there has been NO new sexual partners.    Condoms are never used.  She would not like to be screened for STD's at today's exam.  Current birth control method: OCP (estrogen/progesterone).  She is happy with her current method of contraception and does not want to discuss alternative methods of contraception.  She has been on a triphasic pill for about ~ 6 months - this was started by her prior gynecologist. She has taken minipill and Loestrin Fe. She has used the vaginal ring and patch before.   MENSTRUAL Hx:  Patient's last menstrual period was 2023 (exact date).  In the past 6 months her cycles have been irregular.  Her menstrual flow is typically moderately heavy. She reports a lot of breakthrough   Intermenstrual bleeding is present.    Post-coital bleeding is absent  Dysmenorrhea: moderate and affecting her activities of daily living  PMS: none and is not affecting her activities of daily living  Her cycles ARE a source of concern for her that she wishes to discuss today.  HEALTH Hx:  She exercises regularly: yes.  She wears her seat belt: yes.  She has concerns about domestic violence: no.  OTHER THINGS SHE WANTS TO DISCUSS TODAY:  She she reports having a history of interstitial cystitis and endometriosis.  She did have a laparoscopic procedure done by Dr. La and also a cold knife cone for cervical dysplasia and was noted to have interstitial cystitis at time of cystoscopy and mild endometriosis at time of laparoscopy.  She is currently on an extended cycle combined oral contraceptive pill and reports she overall does the best on this.  She reports her irregular bleeding is more so very minimal right before she is supposed to start her period  "at the 3-month catarino.    The following portions of the patient's history were reviewed and updated as appropriate:problem list, current medications, allergies, past family history, past medical history, past social history, and past surgical history.    Social History    Tobacco Use      Smoking status: Former        Packs/day: 0.50        Years: 3.00        Pack years: 1.5        Types: Cigarettes        Quit date: 2016        Years since quittin.5        Passive exposure: Never      Smokeless tobacco: Never      Tobacco comments: quit 2016    Review of Systems  Constitutional POS: nothing reported    NEG: anorexia or night sweats   Genitourinary POS: nothing reported    NEG: dysuria or hematuria      Gastointestinal POS: nothing reported    NEG: bloating, change in bowel habits, melena, or reflux symptoms   Integument POS: nothing reported    NEG: moles that are changing in size, shape, color or rashes   Breast POS: nothing reported    NEG: persistent breast lump, skin dimpling, or nipple discharge        Objective   /80 (BP Location: Left arm, Patient Position: Sitting, Cuff Size: Adult)   Ht 160 cm (63\")   Wt 59 kg (130 lb)   LMP 2023 (Exact Date)   BMI 23.03 kg/m²     General:  well developed; well nourished  no acute distress   Skin:  No suspicious lesions seen   Thyroid: normal to inspection and palpation   Breasts:  Examined in supine position  Symmetric without masses or skin dimpling  Nipples normal without inversion, lesions or discharge  There are no palpable axillary nodes   Abdomen: soft, non-tender; no masses  no umbilical or inguinal hernias are present  no hepato-splenomegaly   Pelvis: Clinical staff was present for exam  External genitalia:  normal appearance of the external genitalia including Bartholin's and [place]'s glands.  :  urethral meatus normal;  Vaginal:  normal pink mucosa without prolapse or lesions.  Cervix:  normal appearance.  Uterus:  normal size, " shape and consistency.  Adnexa:  normal bimanual exam of the adnexa.  Rectal:  digital rectal exam not performed; anus visually normal appearing.   Negative UPT     Assessment   1. Normal GYN exam  2. Breakthrough bleeding on cOCP  3. History of cervical dysplasia and cold knife cone  4. Endometriosis/chronic pelvic pain  5. Interstitial cystitis followed by urology  6. History of genital HSV     Plan   1. Pap and HPV was done today.  If she does not receive the results of the Pap within 2 weeks  time, she was instructed to call to find out the results.    2. The following tests were ordered today: CBC w/o differential, STD swabs for GC, chlamydia and trichimoniasis and TSH.  It was explained to Georgie that all lab test should be back within the one week after they are performed. She will be notified about the results, regardless of the findings. If she has not been contacted by the office within 2 weeks after the test has been performed, it is her responsibility to contact us to learn about her results.  Reviewed contraceptive options for assistance with pain from endometriosis and also the option of Orilissa and Myfembree but need with those to use nonhormonal contraception and short duration of use.  After this in-depth discussion she desires to continue with her extended cycle combined oral contraceptive pill.  Refill sent to her pharmacy.  We also reviewed the option of doing pelvic floor physical therapy and she is interested in this.  Referral placed  Encouraged her to continue follow-up with urology  Pelvic ultrasound ordered at her convenience to evaluate abnormal uterine bleeding which is most likely breakthrough bleeding from extended cycle oral contraceptive pill  Prescription(s) for OCP's were refilled today   The importance of keeping all planned follow-up and taking all medications as prescribed was emphasized.  Follow up for annual exam in ~ one year and at time of scheduled follow-up  ultrasound.      New Medications Ordered This Visit   Medications   • valACYclovir (Valtrex) 1000 MG tablet     Sig: Take 1 tablet by mouth Daily.     Dispense:  30 tablet     Refill:  12   • Levonorgest-Eth Estrad 91-Day (Ashlyna) 0.15-0.03 &0.01 MG tablet     Sig: Take 1 tablet by mouth Daily.     Dispense:  91 each     Refill:  4          This note was electronically signed.    Lori Retana MD  June 14, 2023

## 2023-06-16 ENCOUNTER — PATIENT ROUNDING (BHMG ONLY) (OUTPATIENT)
Dept: OBSTETRICS AND GYNECOLOGY | Facility: CLINIC | Age: 33
End: 2023-06-16
Payer: MEDICAID

## 2023-06-16 NOTE — PROGRESS NOTES
A [employer] message has been sent to the patient for PATIENT ROUNDING with [facility].

## 2023-06-19 LAB — REF LAB TEST METHOD: NORMAL

## 2023-07-25 ENCOUNTER — LAB (OUTPATIENT)
Dept: LAB | Facility: HOSPITAL | Age: 33
End: 2023-07-25
Payer: MEDICAID

## 2023-07-25 DIAGNOSIS — N92.1 BREAKTHROUGH BLEEDING ON OCPS: ICD-10-CM

## 2023-07-25 LAB
DEPRECATED RDW RBC AUTO: 42.9 FL (ref 37–54)
ERYTHROCYTE [DISTWIDTH] IN BLOOD BY AUTOMATED COUNT: 12.7 % (ref 12.3–15.4)
HCT VFR BLD AUTO: 39.3 % (ref 34–46.6)
HGB BLD-MCNC: 13.3 G/DL (ref 12–15.9)
MCH RBC QN AUTO: 31.5 PG (ref 26.6–33)
MCHC RBC AUTO-ENTMCNC: 33.8 G/DL (ref 31.5–35.7)
MCV RBC AUTO: 93.1 FL (ref 79–97)
PLATELET # BLD AUTO: 249 10*3/MM3 (ref 140–450)
PMV BLD AUTO: 9.9 FL (ref 6–12)
RBC # BLD AUTO: 4.22 10*6/MM3 (ref 3.77–5.28)
TSH SERPL DL<=0.05 MIU/L-ACNC: 0.9 UIU/ML (ref 0.27–4.2)
WBC NRBC COR # BLD: 4.17 10*3/MM3 (ref 3.4–10.8)

## 2023-07-25 PROCEDURE — 84443 ASSAY THYROID STIM HORMONE: CPT

## 2023-07-25 PROCEDURE — 85027 COMPLETE CBC AUTOMATED: CPT

## 2023-07-28 ENCOUNTER — OFFICE VISIT (OUTPATIENT)
Dept: OBSTETRICS AND GYNECOLOGY | Facility: CLINIC | Age: 33
End: 2023-07-28
Payer: MEDICAID

## 2023-07-28 VITALS — WEIGHT: 129 LBS | DIASTOLIC BLOOD PRESSURE: 72 MMHG | SYSTOLIC BLOOD PRESSURE: 110 MMHG | BODY MASS INDEX: 22.85 KG/M2

## 2023-07-28 DIAGNOSIS — R10.2 CHRONIC PELVIC PAIN IN FEMALE: Primary | ICD-10-CM

## 2023-07-28 DIAGNOSIS — G89.29 CHRONIC PELVIC PAIN IN FEMALE: Primary | ICD-10-CM

## 2023-07-28 DIAGNOSIS — K52.9 CHRONIC DIARRHEA: ICD-10-CM

## 2023-07-28 DIAGNOSIS — N80.9 ENDOMETRIOSIS: ICD-10-CM

## 2023-07-28 DIAGNOSIS — R11.2 NAUSEA AND VOMITING IN ADULT: ICD-10-CM

## 2023-07-28 DIAGNOSIS — R87.610 ASCUS OF CERVIX WITH NEGATIVE HIGH RISK HPV: ICD-10-CM

## 2023-07-28 DIAGNOSIS — N30.10 INTERSTITIAL CYSTITIS: ICD-10-CM

## 2023-07-28 PROBLEM — J45.909 ASTHMA: Status: ACTIVE | Noted: 2022-11-18

## 2023-07-28 PROBLEM — F41.9 ANXIETY: Status: ACTIVE | Noted: 2022-12-10

## 2023-07-28 PROBLEM — N87.9 CERVICAL INTRAEPITHELIAL NEOPLASIA (CIN): Status: ACTIVE | Noted: 2022-12-06

## 2023-07-28 PROBLEM — K58.9 IRRITABLE BOWEL SYNDROME: Status: ACTIVE | Noted: 2022-12-10

## 2023-07-28 PROBLEM — B00.9 HSV INFECTION: Status: ACTIVE | Noted: 2022-12-10

## 2023-07-28 PROBLEM — R10.9 CHRONIC ABDOMINAL PAIN: Status: ACTIVE | Noted: 2022-12-10

## 2023-07-28 RX ORDER — HYDROXYZINE HYDROCHLORIDE 25 MG/1
25 TABLET, FILM COATED ORAL 2 TIMES DAILY PRN
COMMUNITY
Start: 2023-06-21

## 2023-07-28 NOTE — PROGRESS NOTES
Subjective   Chief Complaint   Patient presents with    Follow-up     Discuss US done today / having a lot of abdominal pain and diarrhea in the morning     Georgie Winston is a 33 y.o. year old .  Patient's last menstrual period was 2023 (within days).  She presents to be seen because of follow up of labs and chronic pelvic pain with known history of endometriosis and interstitial cystitis.  Patient reports she has been following with Dr. Perez in [place] for her interstitial cystitis and she is on amitriptyline hydroxyzine and does bladder cocktails on a scheduled basis.  She reports that is overall controlling her symptoms well.  However in the past year she is really struggled with having abdominal pain with diarrhea and nausea vomiting in the morning.  It can happen on random days.  She reports it has worsened in the past 2 to 3 months.  She has seen a gastroenterologist in the past at [facility] and in [place] but was not happy with the care and would be willing to see 1 here through [facility] in [place].    OTHER THINGS SHE WANTS TO DISCUSS TODAY:  Nothing else    The following portions of the patient's history were reviewed and updated as appropriate:current medications and allergies    Social History    Tobacco Use      Smoking status: Former        Packs/day: 0.50        Years: 3.00        Pack years: 1.5        Types: Cigarettes        Quit date: 2016        Years since quittin.6        Passive exposure: Never      Smokeless tobacco: Never      Tobacco comments: quit 2016    Review of Systems  Constitutional POS: nothing reported    NEG: anorexia or night sweats   Genitourinary POS: nothing reported    NEG: dysuria or hematuria   Gastointestinal POS:  Chronic diarrhea     NEG: bloating, melena, or reflux symptoms   Integument POS: nothing reported    NEG: moles that are changing in size, shape, color or rashes   Breast POS: nothing reported    NEG: persistent breast lump,  skin dimpling, or nipple discharge         Objective   /72 (BP Location: Right arm, Patient Position: Sitting, Cuff Size: Adult)   Wt 58.5 kg (129 lb)   LMP 05/28/2023 (Within Days)   BMI 22.85 kg/m²     General:  well developed; well nourished  no acute distress   Skin:  Not performed.   Thyroid: not examined   Lungs:  breathing is unlabored   Heart:  Not performed.   Breasts:  Not performed.   Abdomen: Not performed.   Pelvis: Not performed.     Lab Review   CBC and TSH  PAP  Leukorrhea     Imaging   Pelvic ultrasound report        Assessment   Chronic pelvic pain  Endometriosis  Interstitial cystitis  Chronic diarrhea  ASCUS pap, neg hpv     Plan   She is getting continue her extended cycle oral contraceptive pills and continue her follow-up with urology  Recommended referral to gastroenterology given above listed symptoms in HPI.  Referral placed  Reviewed ASCUS Pap with negative HPV and recommendation given her history of cold knife cone in January of this year to repeat Pap smear in 1 year.  The importance of keeping all planned follow-up and taking all medications as prescribed was emphasized.  Follow up for annual exam with PAP in ~ one year    No orders of the defined types were placed in this encounter.       Orders Placed This Encounter   Procedures    Ambulatory Referral to Gastroenterology     Referral Priority:   Routine     Referral Type:   Consultation     Referral Reason:   Specialty Services Required     Requested Specialty:   Gastroenterology     Number of Visits Requested:   1         This note was electronically signed.    Lori Retana MD  July 28, 2023

## 2023-09-07 ENCOUNTER — TELEPHONE (OUTPATIENT)
Dept: UROLOGY | Facility: CLINIC | Age: 33
End: 2023-09-07

## 2023-09-07 NOTE — TELEPHONE ENCOUNTER
Caller: Georgie Winston    Relationship to patient: Self    Best call back number: [phone]    Chief complaint: BLADDER COCKTAIL FOR IC    Type of visit: PROCEDURE VISIT    Requested date: ASAP     If rescheduling, when is the original appointment: N/A     Additional notes: MS. WINSTON WOULD LIKE TO SCHEDULE AN APPOINTMENT FOR A BLADDER COCKTAIL.     PLEASE CALL PATIENT TO SCHEDULE.       ---UNABLE TO WARM TRANSFER

## 2023-09-11 ENCOUNTER — TELEPHONE (OUTPATIENT)
Dept: UROLOGY | Facility: CLINIC | Age: 33
End: 2023-09-11
Payer: MEDICAID

## 2023-09-20 ENCOUNTER — TELEPHONE (OUTPATIENT)
Dept: UROLOGY | Facility: CLINIC | Age: 33
End: 2023-09-20

## 2023-09-21 ENCOUNTER — OFFICE VISIT (OUTPATIENT)
Dept: UROLOGY | Facility: CLINIC | Age: 33
End: 2023-09-21
Payer: MEDICAID

## 2023-09-21 VITALS
HEIGHT: 63 IN | SYSTOLIC BLOOD PRESSURE: 112 MMHG | WEIGHT: 129 LBS | BODY MASS INDEX: 22.86 KG/M2 | DIASTOLIC BLOOD PRESSURE: 72 MMHG

## 2023-09-21 DIAGNOSIS — N30.10 INTERSTITIAL CYSTITIS: Primary | ICD-10-CM

## 2023-09-21 LAB
BILIRUB BLD-MCNC: NEGATIVE MG/DL
CLARITY, POC: CLEAR
COLOR UR: YELLOW
EXPIRATION DATE: ABNORMAL
GLUCOSE UR STRIP-MCNC: NEGATIVE MG/DL
KETONES UR QL: NEGATIVE
LEUKOCYTE EST, POC: NEGATIVE
Lab: ABNORMAL
NITRITE UR-MCNC: NEGATIVE MG/ML
PH UR: 6 [PH] (ref 5–8)
PROT UR STRIP-MCNC: ABNORMAL MG/DL
RBC # UR STRIP: NEGATIVE /UL
SP GR UR: 1.02 (ref 1–1.03)
UROBILINOGEN UR QL: NORMAL

## 2023-09-21 RX ORDER — HYDROXYZINE HYDROCHLORIDE 25 MG/1
25 TABLET, FILM COATED ORAL NIGHTLY
Qty: 90 TABLET | Refills: 3 | Status: SHIPPED | OUTPATIENT
Start: 2023-09-21

## 2023-09-21 NOTE — PROGRESS NOTES
Office Visit General Established Female Patient     Patient Name: Georgie Winston  : 1990   MRN: [id number]     Chief Complaint: No chief complaint on file.      Referring Provider: No ref. provider found    History of Present Illness: Georgie Winston is a 33 y.o. female with history below in assessment, who presents for follow up.   She is managing IC with 5 mg of amitriptyline nightly and 25 mg of hydroxyzine nightly as well if she takes this combination her symptoms are controlled  She is found she continues a organic low acid coffee and have 1 cup daily and this will not cause IC flares  Today she would like a bladder cocktail prior to her upcoming wedding this weekend      Subjective      Review of System:   As noted in HPI     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    Ovarian cyst     Panic attack 2013    Parsonage-Lozano syndrome     left shoulder    Rash and other nonspecific skin eruption     Thoracic nerve injury     left    Urinary tract infection        Past Surgical History:   Past Surgical History:   Procedure Laterality Date    ADENOIDECTOMY      CERVICAL CONE BIOPSY  2023     SECTION N/A 2017    Procedure:  SECTION PRIMARY;  Surgeon: Antione Vazquez MD;  Location: [facility] LABOR DELIVERY;  Service:      SECTION  2019    CYSTOSCOPY  2023    ENDOSCOPY AND COLONOSCOPY  2016    PELVIC LAPAROSCOPY  2023    TONSILLECTOMY Bilateral      UMBILICAL HERNIA REPAIR      WISDOM TOOTH EXTRACTION         Family History:   Family History   Problem Relation Age of Onset    Hypertension Father     Diabetes Father     Heart attack Father     Osteoporosis Mother     Hypertension Mother     Diabetes Mother     Colon polyps Mother     Osteoporosis Maternal Aunt     Alcohol abuse Maternal Uncle     Liver disease Maternal Uncle     Colon cancer Neg Hx     Liver cancer Neg Hx     Stomach cancer Neg Hx     Esophageal cancer Neg Hx     Breast cancer Neg Hx     Ovarian cancer Neg Hx     Pancreatic cancer Neg Hx     Prostate cancer Neg Hx        Social History:   Social History     Socioeconomic History    Marital status: Single   Tobacco Use    Smoking status: Former     Packs/day: 0.50     Years: 3.00     Pack years: 1.50     Types: Cigarettes     Quit date: 2016     Years since quittin.8     Passive exposure: Never    Smokeless tobacco: Never    Tobacco comments:     quit 2016   Vaping Use    Vaping Use: Former    Substances: Nicotine, Flavoring    Devices: Pre-filled or refillable cartridge   Substance and Sexual Activity    Alcohol use: Yes     Comment: RARE    Drug use: No    Sexual activity: Yes     Partners: Male     Birth control/protection: OCP       Medications:     Current Outpatient Medications:     hydrOXYzine (ATARAX) 25 MG tablet, Take 1 tablet by mouth Every Night., Disp: 90 tablet, Rfl: 3    amitriptyline (ELAVIL) 10 MG tablet, Take 1 tablet by mouth Every Night., Disp: 30 tablet, Rfl: 11    Levonorgest-Eth Estrad 91-Day (Ashlyna) 0.15-0.03 &0.01 MG tablet, Take 1 tablet by mouth Daily., Disp: 91 each, Rfl: 4    Loratadine (CLARITIN PO), Take  by mouth., Disp: , Rfl:     phenazopyridine (Pyridium) 200 MG tablet, Take 1 tablet by mouth 3 (Three) Times a Day As Needed for Bladder Spasms., Disp: 20 tablet, Rfl: 0    valACYclovir (Valtrex) 1000 MG tablet, Take 1 tablet by mouth Daily., Disp: 30 tablet, Rfl: 12    Current  "Facility-Administered Medications:     sodium bicarbonate injection 8.4% 25 mEq, 25 mEq, Intravenous, Once, Chandan Alexander APRN    triamcinolone acetonide (KENALOG-40) injection 40 mg, 40 mg, Intramuscular, Once, Chandan Alexander APRN    Allergies:   Allergies   Allergen Reactions    Contrast Dye (Echo Or Unknown Ct/Mr) Anaphylaxis     Premedication for CT scan with prednisone, Benadryl, and Tagamet did not work for patient, she still had hives and swelling after contrast.    Shellfish Allergy Anaphylaxis    Shellfish-Derived Products Anaphylaxis    Amoxicillin Other (See Comments)     Yeast inf    Ciprofloxacin Hcl Mental Status Change     Changes personality \"altered state of mind; I become violent & can't control my actions.\"    Erythromycin Diarrhea and Nausea And Vomiting    Gabapentin GI Intolerance    Zithromax [Azithromycin] Nausea And Vomiting     NVD    Demerol [Meperidine] Hives     IV after surgery    Flagyl [Metronidazole] Rash       Objective     Physical Exam:   Vital Signs:   Visit Vitals  /72 (BP Location: Left arm, Patient Position: Sitting, Cuff Size: Adult)   Ht 160 cm (62.99\")   Wt 58.5 kg (129 lb)   BMI 22.86 kg/m²      Body mass index is 22.86 kg/m².     Constitutional: NAD, WDWN.   Neurological: A + O x 3   Psychiatric:  Normal mood and affect    Labs  Brief Urine Lab Results  (Last result in the past 365 days)        Color   Clarity   Blood   Leuk Est   Nitrite   Protein   CREAT   Urine HCG        07/14/23 1442 Yellow   Clear   Negative   Negative   Negative   Trace                   Lab Results   Component Value Date    GLUCOSE 87 01/05/2023    CALCIUM 9.1 01/05/2023     01/05/2023    K 4.2 01/05/2023    CO2 24.9 01/05/2023     01/05/2023    BUN 9 01/05/2023    CREATININE 0.95 01/05/2023    EGFRIFNONA 120 06/10/2017    BCR 9.5 01/05/2023    ANIONGAP 11.1 01/05/2023       Lab Results   Component Value Date    WBC 4.17 07/25/2023    HGB 13.3 07/25/2023    HCT 39.3 " 07/25/2023    MCV 93.1 07/25/2023     07/25/2023       Urine Culture          5/2/2023    00:00   Urine Culture   Urine Culture Final report         Radiographic Studies  No Images in the past 120 days found..    I have reviewed the above labs and imaging.     Assessment / Plan      Assessment/Plan:   Diagnoses and all orders for this visit:    1. Interstitial cystitis (Primary)  -     POC Urinalysis Dipstick, Automated  -     hydrOXYzine (ATARAX) 25 MG tablet; Take 1 tablet by mouth Every Night.  Dispense: 90 tablet; Refill: 3    33-year-old female with IC is managing well and having minimal flares using hydroxyzine and amitriptyline.  Today UA is benign.  She would like to have a bladder cocktail today.    Continue hydroxyzine 25 mg nightly and amitriptyline 5 mg nightly  Bladder cocktail today and can return as needed    Follow Up:   Return in about 1 year (around 9/21/2024) for Follow up Chandan.    ROD Bucio, NP-C  [facility] [place]

## 2023-09-21 NOTE — PROGRESS NOTES
pt was clenaed off with bedatine given lidocaine numbing then a in and out cath was entered she was given medicine via catheter and the catheter was removed pt was laid down for 10 minutes no issues nor concerns with procedure.     CD,CMA

## 2023-11-08 ENCOUNTER — TELEPHONE (OUTPATIENT)
Dept: UROLOGY | Facility: CLINIC | Age: 33
End: 2023-11-08

## 2023-11-14 ENCOUNTER — PROCEDURE VISIT (OUTPATIENT)
Dept: UROLOGY | Facility: CLINIC | Age: 33
End: 2023-11-14
Payer: MEDICAID

## 2023-11-14 DIAGNOSIS — N30.10 INTERSTITIAL CYSTITIS: Primary | ICD-10-CM

## 2023-11-14 LAB
BILIRUB BLD-MCNC: NEGATIVE MG/DL
CLARITY, POC: CLEAR
COLOR UR: YELLOW
EXPIRATION DATE: NORMAL
GLUCOSE UR STRIP-MCNC: NEGATIVE MG/DL
KETONES UR QL: NEGATIVE
LEUKOCYTE EST, POC: NEGATIVE
Lab: NORMAL
NITRITE UR-MCNC: NEGATIVE MG/ML
PH UR: 6.5 [PH] (ref 5–8)
PROT UR STRIP-MCNC: NEGATIVE MG/DL
RBC # UR STRIP: NEGATIVE /UL
SP GR UR: 1.01 (ref 1–1.03)
UROBILINOGEN UR QL: NORMAL

## 2023-11-14 NOTE — PROGRESS NOTES
Patient in office for IC bladder cocktail treatment. Lidocaine,heparin,sodium bicarb, and kenalog medications were mixed in sterile container and given directly to patient via catheter. She tolerated well. No complications noted in office. Patient advised to hold treatment in at least 20 mins before urinating. She will return as needed for f/u treatments.  MARGUERITE Lagos

## 2024-02-09 ENCOUNTER — PROCEDURE VISIT (OUTPATIENT)
Dept: UROLOGY | Facility: CLINIC | Age: 34
End: 2024-02-09
Payer: MEDICAID

## 2024-02-09 DIAGNOSIS — N30.10 INTERSTITIAL CYSTITIS: Primary | ICD-10-CM

## 2024-02-09 LAB
BILIRUB BLD-MCNC: NEGATIVE MG/DL
CLARITY, POC: CLEAR
COLOR UR: ABNORMAL
EXPIRATION DATE: ABNORMAL
GLUCOSE UR STRIP-MCNC: NEGATIVE MG/DL
KETONES UR QL: NEGATIVE
LEUKOCYTE EST, POC: NEGATIVE
Lab: ABNORMAL
NITRITE UR-MCNC: NEGATIVE MG/ML
PH UR: 6 [PH] (ref 5–8)
PROT UR STRIP-MCNC: NEGATIVE MG/DL
RBC # UR STRIP: ABNORMAL /UL
SP GR UR: 1.01 (ref 1–1.03)
UROBILINOGEN UR QL: NORMAL

## 2024-02-09 NOTE — PROGRESS NOTES
Patient was given pericare with iodine and glyco gel. Patient stated iodine topical did not bother her even though she has shellfish allergy. Patient was inserted with 14FR catheter and the interstitial cystitis bladder cocktail was inserted into catheter. Patient tolerated medication without any issues or complications. Patient stayed in supine position for 10 minutes. Patient will return for repeat as needed.     STAN Zamorano (AMT)

## 2024-03-03 DIAGNOSIS — N30.10 IC (INTERSTITIAL CYSTITIS): ICD-10-CM

## 2024-03-05 RX ORDER — AMITRIPTYLINE HYDROCHLORIDE 10 MG/1
5 TABLET, FILM COATED ORAL NIGHTLY
Qty: 15 TABLET | Refills: 11 | Status: SHIPPED | OUTPATIENT
Start: 2024-03-05

## 2024-03-19 DIAGNOSIS — N30.10 INTERSTITIAL CYSTITIS: ICD-10-CM

## 2024-03-20 RX ORDER — HYDROXYZINE HYDROCHLORIDE 25 MG/1
25 TABLET, FILM COATED ORAL NIGHTLY
Qty: 90 TABLET | Refills: 3 | Status: SHIPPED | OUTPATIENT
Start: 2024-03-20

## 2024-03-28 ENCOUNTER — TELEPHONE (OUTPATIENT)
Dept: UROLOGY | Facility: CLINIC | Age: 34
End: 2024-03-28

## 2024-03-28 NOTE — TELEPHONE ENCOUNTER
Caller: Georgie Winston    Relationship to patient: Self    Best call back number: [phone]      Additional notes:PLEASE CANCEL - IN OFFICE PROCEDURE ON 3/29/24.  PER PT APPT NOT NEEDED.    UNABLE TO WARM SHELDON.

## 2024-04-08 ENCOUNTER — TELEPHONE (OUTPATIENT)
Dept: OBSTETRICS AND GYNECOLOGY | Facility: CLINIC | Age: 34
End: 2024-04-08
Payer: MEDICAID

## 2024-04-08 NOTE — TELEPHONE ENCOUNTER
REBECCA    Patient called stating that she is on Ashlyna birth control and has been on it for a year.  She has had break through bleeding.  She bled for for a month then it stopped for a month and a half and now it has started again.  Please advise.    PHARMACY:  [facility]  PHONE:  [phone]  FAX :  [phone]

## 2024-06-26 RX ORDER — VALACYCLOVIR HYDROCHLORIDE 1 G/1
1000 TABLET, FILM COATED ORAL DAILY
Qty: 30 TABLET | Refills: 0 | Status: SHIPPED | OUTPATIENT
Start: 2024-06-26

## 2024-07-05 ENCOUNTER — OFFICE VISIT (OUTPATIENT)
Dept: OBSTETRICS AND GYNECOLOGY | Facility: CLINIC | Age: 34
End: 2024-07-05
Payer: MEDICAID

## 2024-07-05 VITALS
BODY MASS INDEX: 26.05 KG/M2 | HEIGHT: 63 IN | DIASTOLIC BLOOD PRESSURE: 80 MMHG | SYSTOLIC BLOOD PRESSURE: 120 MMHG | WEIGHT: 147 LBS

## 2024-07-05 DIAGNOSIS — R14.0 ABDOMINAL BLOATING: ICD-10-CM

## 2024-07-05 DIAGNOSIS — Z01.419 WELL WOMAN EXAM WITH ROUTINE GYNECOLOGICAL EXAM: Primary | ICD-10-CM

## 2024-07-05 DIAGNOSIS — N93.9 ABNORMAL UTERINE BLEEDING (AUB): ICD-10-CM

## 2024-07-05 DIAGNOSIS — N80.9 ENDOMETRIOSIS: ICD-10-CM

## 2024-07-05 DIAGNOSIS — Z76.89 ESTABLISHING CARE WITH NEW DOCTOR, ENCOUNTER FOR: ICD-10-CM

## 2024-07-05 DIAGNOSIS — R10.2 PELVIC PAIN IN FEMALE: ICD-10-CM

## 2024-07-05 DIAGNOSIS — Z86.19 HISTORY OF HPV INFECTION: ICD-10-CM

## 2024-07-05 DIAGNOSIS — Z30.41 ORAL CONTRACEPTIVE PILL SURVEILLANCE: ICD-10-CM

## 2024-07-05 RX ORDER — NORETHINDRONE ACETATE AND ETHINYL ESTRADIOL 1MG-20(21)
1 KIT ORAL DAILY
Qty: 28 TABLET | Refills: 12 | Status: SHIPPED | OUTPATIENT
Start: 2024-07-05 | End: 2025-07-05

## 2024-07-05 NOTE — PROGRESS NOTES
Subjective   Chief Complaint   Patient presents with    Annual Exam     Cycles have been really long / horrible bloating/cramping every day / constant back pain     Georgie Winston is a 34 y.o. year old  presenting to be seen for her annual exam.     SEXUAL Hx:  She is currently sexually active.  In the past year there there has been NO new sexual partners.    Condoms are never used.  She would not like to be screened for STD's at today's exam.  Current birth control method: OCP (estrogen/progesterone).  She is happy with her current method of contraception and does not want to discuss alternative methods of contraception.  MENSTRUAL Hx:  Patient's last menstrual period was 2024 (within days).  She has been on on the COCP she is currently on since around 2023 or sooner. She reports more recently her menstrual cycles or vaginal bleeding has been unpredictable and she will spot many weeks in a row and sometimes the bleeding will be light and sometimes it will be heavier. She denies missing any pills and overall takes them the same time every day   She reports feeling bloating and having overall mid to upper abdominal pain and feels gonsales.   Her cycles ARE a source of concern for her that she wishes to discuss today.  HEALTH Hx:  She exercises regularly: no (but is planning to start exercising more).  She wears her seat belt: yes.  She has concerns about domestic violence: no.  OTHER THINGS SHE WANTS TO DISCUSS TODAY:  Nothing else    The following portions of the patient's history were reviewed and updated as appropriate:problem list, current medications, allergies, past family history, past medical history, past social history, and past surgical history.    Social History    Tobacco Use      Smoking status: Former        Packs/day: 0.00        Years: 0.5 packs/day for 3.0 years (1.5 ttl pk-yrs)        Types: Cigarettes        Start date: 2013        Quit date: 2016        Years since  "quittin.5        Passive exposure: Never      Smokeless tobacco: Never      Tobacco comments: quit 2016    Review of Systems  Constitutional POS: nothing reported    NEG: anorexia or night sweats   Genitourinary POS: nothing reported    NEG: dysuria or hematuria      Gastointestinal POS:  chronic diarrhea, bloating     NEG: bloating, change in bowel habits, melena, or reflux symptoms   Integument POS: nothing reported    NEG: moles that are changing in size, shape, color or rashes   Breast POS: nothing reported    NEG: persistent breast lump, skin dimpling, or nipple discharge        Objective   /80 (BP Location: Right arm, Patient Position: Sitting, Cuff Size: Adult)   Ht 160 cm (62.99\")   Wt 66.7 kg (147 lb)   LMP 2024 (Within Days) Comment: lasting about 8 weeks  BMI 26.05 kg/m²     General:  well developed; well nourished  no acute distress   Skin:  No suspicious lesions seen   Thyroid: normal to inspection and palpation   Breasts:  Examined in supine position  Symmetric without masses or skin dimpling  Nipples normal without inversion, lesions or discharge  There are no palpable axillary nodes  Fibrocystic changes are present both breasts without a discrete mass   Abdomen: soft, non-tender; no masses  no umbilical or inguinal hernias are present  no hepato-splenomegaly   Pelvis: Clinical staff was present for exam  External genitalia:  normal appearance of the external genitalia including Bartholin's and Middlebush's glands.  :  urethral meatus normal;  Vaginal:  normal pink mucosa without prolapse or lesions.  Cervix:  normal appearance.  Uterus:  normal size, shape and consistency.  Adnexa:  normal bimanual exam of the adnexa.  Rectal:  digital rectal exam not performed; anus visually normal appearing.  Pelvic floor pain: her symptoms are not reproduced with palpation over the rectum;  her symptoms are not reproduced with palpation under the bladder base; her symptoms ARE reproduced " with palpation on the right pelvic sidewall; her symptoms ARE reproduced with palpation on the left pelvic sidewall;        Assessment   Normal GYN exam  History of interstitial cystitis followed by urology   History of endometriosis on extended cycle COCP  BTB on COCP  Chronic pelvic pain  Abdominal bloating  History of cervical dysplasia      Plan   Pap was done today.  If she does not receive the results of the Pap within 2 weeks  time, she was instructed to call to find out the results.  I explained to Georgie that because she is being seen in follow-up of a previously abnormal Pap smear, her next Pap needs to be repeated based on results from today.   The following tests were ordered today: STD swabs for GC, chlamydia, and trichimoniasis and yeast and BV .  It was explained to Georgie that all lab test should be back within the one week after they are performed. She will be notified about the results, regardless of the findings. If she has not been contacted by the office within 2 weeks after the test has been performed, it is her responsibility to contact us to learn about her results.  Irregular bleeding on birth control is reasonably common.  It is not a sign of decreased effectiveness so long as she is not missing pills.  If it is intermittent and not bothersome, it does not need to be treated.  If it is a recurring problem or one that she wishes to be corrected it can usually be fixed with simple medication adjustments.  As long as pregnancy has been excluded and there is no associated pain, the decision to treat or work this up further should be left up to Georgie's discretion. She did not leave a urine sample today for UPT.   Reviewed the importance of exercise 2-3 times per week with at least 20-30 minutes of cardio  Recommended referral to PFPT and GI and she agrees to pursue these two referrals  In regards to medical management of bleeding and pain she desires to remain on a birth control pill  as opposed to other options. Will try a monophasic non extended cycle pill. Rx sent to pharmacy.   Prescription(s) for OCP's were refilled today   The importance of keeping all planned follow-up and taking all medications as prescribed was emphasized.  Follow up for annual exam in ~ one year and in ~ 3 months with pelvic ultrasound and follow up of new medication start.     New Medications Ordered This Visit   Medications    norethindrone-ethinyl estradiol FE (Junel FE 1/20) 1-20 MG-MCG per tablet     Sig: Take 1 tablet by mouth Daily.     Dispense:  28 tablet     Refill:  12     New Medications Ordered This Visit   Medications    norethindrone-ethinyl estradiol FE (Junel FE 1/20) 1-20 MG-MCG per tablet     Sig: Take 1 tablet by mouth Daily.     Dispense:  28 tablet     Refill:  12        Orders Placed This Encounter   Procedures    OneSwab - Kit, Vagina     Standing Status:   Future     Standing Expiration Date:   7/5/2025     Order Specific Question:   Release to patient     Answer:   Routine Release [[id number]]     Non-ob Transvaginal     Standing Status:   Future     Standing Expiration Date:   7/5/2025     Order Specific Question:   Reason for Exam:     Answer:   Pain - RLQ and LLQ pain, bloating, AUB     Order Specific Question:   Release to patient     Answer:   Routine Release [[id number]]    Ambulatory Referral to Family Practice     Referral Priority:   Routine     Referral Type:   Consultation     Referral Reason:   Specialty Services Required     Requested Specialty:   Family Medicine     Number of Visits Requested:   1    Ambulatory Referral to Physical Therapy for Evaluation & Treatment     Referral Priority:   Routine     Referral Type:   Physical Therapy     Referral Reason:   Specialty Services Required     Referral Location:   [facility]     Requested Specialty:   Physical Therapy     Number of Visits Requested:   1    Ambulatory Referral to Gastroenterology     Referral  Priority:   Routine     Referral Type:   Consultation     Referral Reason:   Specialty Services Required     Requested Specialty:   Gastroenterology     Number of Visits Requested:   1         This note was electronically signed.    Lori Retana MD  July 5, 2024

## 2024-07-15 LAB — REF LAB TEST METHOD: NORMAL

## 2024-07-24 ENCOUNTER — OFFICE VISIT (OUTPATIENT)
Dept: INTERNAL MEDICINE | Facility: CLINIC | Age: 34
End: 2024-07-24
Payer: MEDICAID

## 2024-07-24 VITALS
WEIGHT: 146 LBS | OXYGEN SATURATION: 97 % | BODY MASS INDEX: 25.87 KG/M2 | HEIGHT: 63 IN | DIASTOLIC BLOOD PRESSURE: 80 MMHG | HEART RATE: 101 BPM | SYSTOLIC BLOOD PRESSURE: 122 MMHG | RESPIRATION RATE: 16 BRPM

## 2024-07-24 DIAGNOSIS — R68.89 COLD INTOLERANCE: ICD-10-CM

## 2024-07-24 DIAGNOSIS — Z00.00 ENCOUNTER FOR PREVENTIVE CARE: ICD-10-CM

## 2024-07-24 DIAGNOSIS — Z11.59 ENCOUNTER FOR HEPATITIS C SCREENING TEST FOR LOW RISK PATIENT: ICD-10-CM

## 2024-07-24 DIAGNOSIS — K58.2 IRRITABLE BOWEL SYNDROME WITH BOTH CONSTIPATION AND DIARRHEA: Primary | ICD-10-CM

## 2024-07-24 DIAGNOSIS — N30.10 INTERSTITIAL CYSTITIS: ICD-10-CM

## 2024-07-24 DIAGNOSIS — F41.9 ANXIETY: ICD-10-CM

## 2024-07-24 PROCEDURE — 1125F AMNT PAIN NOTED PAIN PRSNT: CPT | Performed by: STUDENT IN AN ORGANIZED HEALTH CARE EDUCATION/TRAINING PROGRAM

## 2024-07-24 PROCEDURE — 2014F MENTAL STATUS ASSESS: CPT | Performed by: STUDENT IN AN ORGANIZED HEALTH CARE EDUCATION/TRAINING PROGRAM

## 2024-07-24 PROCEDURE — 99385 PREV VISIT NEW AGE 18-39: CPT | Performed by: STUDENT IN AN ORGANIZED HEALTH CARE EDUCATION/TRAINING PROGRAM

## 2024-07-24 RX ORDER — DICYCLOMINE HYDROCHLORIDE 10 MG/1
10 CAPSULE ORAL 3 TIMES DAILY PRN
Qty: 90 CAPSULE | Refills: 1 | Status: SHIPPED | OUTPATIENT
Start: 2024-07-24
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
## 2024-07-24 NOTE — ASSESSMENT & PLAN NOTE
New undiagnosed problem  Obtain labs for now  Electrolyte imbalance vs anemia vs thyroid disorder vs changing birth control often

## 2024-07-25 ENCOUNTER — LAB (OUTPATIENT)
Dept: LAB | Facility: HOSPITAL | Age: 34
End: 2024-07-25
Payer: MEDICAID

## 2024-07-25 LAB
ALBUMIN SERPL-MCNC: 4.1 G/DL (ref 3.5–5.2)
ALBUMIN/GLOB SERPL: 1.5 G/DL
ALP SERPL-CCNC: 35 U/L (ref 39–117)
ALT SERPL W P-5'-P-CCNC: 14 U/L (ref 1–33)
ANION GAP SERPL CALCULATED.3IONS-SCNC: 12 MMOL/L (ref 5–15)
AST SERPL-CCNC: 18 U/L (ref 1–32)
BASOPHILS # BLD AUTO: 0.02 10*3/MM3 (ref 0–0.2)
BASOPHILS NFR BLD AUTO: 0.4 % (ref 0–1.5)
BILIRUB SERPL-MCNC: 0.2 MG/DL (ref 0–1.2)
BUN SERPL-MCNC: 8 MG/DL (ref 6–20)
BUN/CREAT SERPL: 7.7 (ref 7–25)
CALCIUM SPEC-SCNC: 8.5 MG/DL (ref 8.6–10.5)
CHLORIDE SERPL-SCNC: 104 MMOL/L (ref 98–107)
CHOLEST SERPL-MCNC: 217 MG/DL (ref 0–200)
CO2 SERPL-SCNC: 23 MMOL/L (ref 22–29)
CREAT SERPL-MCNC: 1.04 MG/DL (ref 0.57–1)
DEPRECATED RDW RBC AUTO: 40.8 FL (ref 37–54)
EGFRCR SERPLBLD CKD-EPI 2021: 72.5 ML/MIN/1.73
EOSINOPHIL # BLD AUTO: 0.05 10*3/MM3 (ref 0–0.4)
EOSINOPHIL NFR BLD AUTO: 1.1 % (ref 0.3–6.2)
ERYTHROCYTE [DISTWIDTH] IN BLOOD BY AUTOMATED COUNT: 12.4 % (ref 12.3–15.4)
GLOBULIN UR ELPH-MCNC: 2.8 GM/DL
GLUCOSE SERPL-MCNC: 80 MG/DL (ref 65–99)
HCT VFR BLD AUTO: 40.9 % (ref 34–46.6)
HDLC SERPL-MCNC: 49 MG/DL (ref 40–60)
HGB BLD-MCNC: 14 G/DL (ref 12–15.9)
IMM GRANULOCYTES # BLD AUTO: 0.02 10*3/MM3 (ref 0–0.05)
IMM GRANULOCYTES NFR BLD AUTO: 0.4 % (ref 0–0.5)
LDLC SERPL CALC-MCNC: 150 MG/DL (ref 0–100)
LDLC/HDLC SERPL: 3.01 {RATIO}
LYMPHOCYTES # BLD AUTO: 1.56 10*3/MM3 (ref 0.7–3.1)
LYMPHOCYTES NFR BLD AUTO: 33.3 % (ref 19.6–45.3)
MCH RBC QN AUTO: 31.3 PG (ref 26.6–33)
MCHC RBC AUTO-ENTMCNC: 34.2 G/DL (ref 31.5–35.7)
MCV RBC AUTO: 91.5 FL (ref 79–97)
MONOCYTES # BLD AUTO: 0.48 10*3/MM3 (ref 0.1–0.9)
MONOCYTES NFR BLD AUTO: 10.3 % (ref 5–12)
NEUTROPHILS NFR BLD AUTO: 2.55 10*3/MM3 (ref 1.7–7)
NEUTROPHILS NFR BLD AUTO: 54.5 % (ref 42.7–76)
NRBC BLD AUTO-RTO: 0 /100 WBC (ref 0–0.2)
PLATELET # BLD AUTO: 266 10*3/MM3 (ref 140–450)
PMV BLD AUTO: 10 FL (ref 6–12)
POTASSIUM SERPL-SCNC: 4.2 MMOL/L (ref 3.5–5.2)
PROT SERPL-MCNC: 6.9 G/DL (ref 6–8.5)
RBC # BLD AUTO: 4.47 10*6/MM3 (ref 3.77–5.28)
SODIUM SERPL-SCNC: 139 MMOL/L (ref 136–145)
TRIGL SERPL-MCNC: 103 MG/DL (ref 0–150)
TSH SERPL DL<=0.05 MIU/L-ACNC: 0.76 UIU/ML (ref 0.27–4.2)
VLDLC SERPL-MCNC: 18 MG/DL (ref 5–40)
WBC NRBC COR # BLD AUTO: 4.68 10*3/MM3 (ref 3.4–10.8)

## 2024-07-25 PROCEDURE — 80053 COMPREHEN METABOLIC PANEL: CPT | Performed by: STUDENT IN AN ORGANIZED HEALTH CARE EDUCATION/TRAINING PROGRAM

## 2024-07-25 PROCEDURE — 80061 LIPID PANEL: CPT | Performed by: STUDENT IN AN ORGANIZED HEALTH CARE EDUCATION/TRAINING PROGRAM

## 2024-07-25 PROCEDURE — 85025 COMPLETE CBC W/AUTO DIFF WBC: CPT | Performed by: STUDENT IN AN ORGANIZED HEALTH CARE EDUCATION/TRAINING PROGRAM

## 2024-07-25 PROCEDURE — 84443 ASSAY THYROID STIM HORMONE: CPT | Performed by: STUDENT IN AN ORGANIZED HEALTH CARE EDUCATION/TRAINING PROGRAM

## 2024-07-25 PROCEDURE — 87522 HEPATITIS C REVRS TRNSCRPJ: CPT | Performed by: STUDENT IN AN ORGANIZED HEALTH CARE EDUCATION/TRAINING PROGRAM

## 2024-07-25 PROCEDURE — 36415 COLL VENOUS BLD VENIPUNCTURE: CPT | Performed by: STUDENT IN AN ORGANIZED HEALTH CARE EDUCATION/TRAINING PROGRAM

## 2024-07-29 ENCOUNTER — TELEMEDICINE (OUTPATIENT)
Dept: UROLOGY | Facility: CLINIC | Age: 34
End: 2024-07-29
Payer: MEDICAID

## 2024-07-29 DIAGNOSIS — R68.89 COLD INTOLERANCE: ICD-10-CM

## 2024-07-29 DIAGNOSIS — N30.10 INTERSTITIAL CYSTITIS: Primary | ICD-10-CM

## 2024-07-29 DIAGNOSIS — M62.89 PELVIC FLOOR DYSFUNCTION IN FEMALE: ICD-10-CM

## 2024-07-29 DIAGNOSIS — Z00.00 ENCOUNTER FOR PREVENTIVE CARE: Primary | ICD-10-CM

## 2024-07-29 DIAGNOSIS — E78.00 PURE HYPERCHOLESTEROLEMIA: ICD-10-CM

## 2024-07-29 LAB
HCV RNA SERPL NAA+PROBE-ACNC: NORMAL IU/ML
TEST INFORMATION: NORMAL

## 2024-07-29 PROCEDURE — 1159F MED LIST DOCD IN RCRD: CPT | Performed by: NURSE PRACTITIONER

## 2024-07-29 PROCEDURE — 1160F RVW MEDS BY RX/DR IN RCRD: CPT | Performed by: NURSE PRACTITIONER

## 2024-07-29 PROCEDURE — 99214 OFFICE O/P EST MOD 30 MIN: CPT | Performed by: NURSE PRACTITIONER

## 2024-07-29 NOTE — PROGRESS NOTES
Video Visit     Patient Name: Georgie Winston  : 1990   MRN: [id number]     Chief Complaint: No chief complaint on file.      Referring Provider: No ref. provider found    History of Present Illness: Georgie Winston is a 34 y.o. female who presents today for IC and pelvic floor dysfunction.  She reports she has the nerve component of IC manage and decided to go to PFPT and has been unable to have any internal work due to muscle spasms from pelvic floor dysfunction.  Her PFPT recommended she try vaginal Valium suppositories to help relax muscles.    Patient agrees to visit via video and is located in the [facility]      Subjective      Review of System:   As noted in HPI    Medications:     Current Outpatient Medications:     amitriptyline (ELAVIL) 10 MG tablet, Take 0.5 tablets by mouth Every Night., Disp: 15 tablet, Rfl: 11    dicyclomine (BENTYL) 10 MG capsule, Take 1 capsule by mouth 3 (Three) Times a Day As Needed for Abdominal Cramping., Disp: 90 capsule, Rfl: 1    hydrOXYzine (ATARAX) 25 MG tablet, Take 1 tablet by mouth Every Night., Disp: 90 tablet, Rfl: 3    Loratadine (CLARITIN PO), Take  by mouth., Disp: , Rfl:     norethindrone-ethinyl estradiol FE (Junel FE ) 1-20 MG-MCG per tablet, Take 1 tablet by mouth Daily., Disp: 28 tablet, Rfl: 12    phenazopyridine (Pyridium) 200 MG tablet, Take 1 tablet by mouth 3 (Three) Times a Day As Needed for Bladder Spasms., Disp: 20 tablet, Rfl: 0    valACYclovir (VALTREX) 1000 MG tablet, TAKE 1 TABLET BY MOUTH DAILY, Disp: 30 tablet, Rfl: 0    Allergies:   Allergies   Allergen Reactions    Contrast Dye (Echo Or Unknown Ct/Mr) Anaphylaxis     Premedication for CT scan with prednisone, Benadryl, and Tagamet did not work for patient, she still had hives and swelling after contrast.    Shellfish Allergy Anaphylaxis    Shellfish-Derived Products Anaphylaxis    Amoxicillin Other (See Comments)     Yeast inf    Ciprofloxacin Hcl Mental Status Change     " Changes personality \"altered state of mind; I become violent & can't control my actions.\"    Erythromycin Diarrhea and Nausea And Vomiting    Gabapentin GI Intolerance    Zithromax [Azithromycin] Nausea And Vomiting     NVD    Demerol [Meperidine] Hives     IV after surgery    Flagyl [Metronidazole] Rash       Objective     Physical Exam:   Vital Signs: There were no vitals filed for this visit.  There is no height or weight on file to calculate BMI.     Physical Exam  Constitutional:       Appearance: Normal appearance.   Neurological:      Mental Status: She is alert and oriented to person, place, and time.   Psychiatric:         Mood and Affect: Mood normal.         Behavior: Behavior normal.          Labs:   Brief Urine Lab Results  (Last result in the past 365 days)        Color   Clarity   Blood   Leuk Est   Nitrite   Protein   CREAT   Urine HCG        02/09/24 1021 Dark Yellow   Clear   1+   Negative   Negative   Negative                        Lab Results   Component Value Date    GLUCOSE 80 07/25/2024    CALCIUM 8.5 (L) 07/25/2024     07/25/2024    K 4.2 07/25/2024    CO2 23.0 07/25/2024     07/25/2024    BUN 8 07/25/2024    CREATININE 1.04 (H) 07/25/2024    EGFRIFNONA 120 06/10/2017    BCR 7.7 07/25/2024    ANIONGAP 12.0 07/25/2024       Lab Results   Component Value Date    WBC 4.68 07/25/2024    HGB 14.0 07/25/2024    HCT 40.9 07/25/2024    MCV 91.5 07/25/2024     07/25/2024       Images:   No Images in the past 120 days found..    Assessment / Plan      Assessment/Plan:   Diagnoses and all orders for this visit:    1. Interstitial cystitis (Primary)    2. Pelvic floor dysfunction in female    34-year-old female with a history of IC has concerns of pelvic floor dysfunction as well.  We discussed prior to starting with Valium suppositories if her symptoms are muscle spasms I would recommend a trial of the baclofen suppositories prior to PFPT and intercourse to improve muscle spasms and " help with muscle relaxation.  She agrees to this plan she is aware to use suppositories 1 hour prior to PFPT and intercourse.  Will plan for follow-up in 2 months to see if she has noticed any improvements with PFPT and use of suppositories.    Start 10 mg baclofen suppositories 1 hour prior to PFPT and intercourse      Follow Up:   Return in about 8 weeks (around 9/23/2024) for Follow up Chandan.    ROD Bucio, NP-C  [facility] [place]

## 2024-07-30 ENCOUNTER — PATIENT MESSAGE (OUTPATIENT)
Dept: INTERNAL MEDICINE | Facility: CLINIC | Age: 34
End: 2024-07-30
Payer: MEDICAID

## 2024-07-30 DIAGNOSIS — R68.89 COLD INTOLERANCE: Primary | ICD-10-CM

## 2024-07-31 ENCOUNTER — OFFICE VISIT (OUTPATIENT)
Dept: GASTROENTEROLOGY | Facility: CLINIC | Age: 34
End: 2024-07-31
Payer: MEDICAID

## 2024-07-31 VITALS
BODY MASS INDEX: 25.91 KG/M2 | OXYGEN SATURATION: 99 % | SYSTOLIC BLOOD PRESSURE: 100 MMHG | DIASTOLIC BLOOD PRESSURE: 82 MMHG | HEIGHT: 63 IN | HEART RATE: 81 BPM | WEIGHT: 146.2 LBS | TEMPERATURE: 97.7 F

## 2024-07-31 DIAGNOSIS — R10.11 RIGHT UPPER QUADRANT ABDOMINAL PAIN: ICD-10-CM

## 2024-07-31 DIAGNOSIS — K58.0 IRRITABLE BOWEL SYNDROME WITH DIARRHEA: Primary | ICD-10-CM

## 2024-07-31 NOTE — PROGRESS NOTES
"    Office Note     Name: Georgie Winston    : 1990     MRN: [id number]     Chief Complaint  Bloated and Diarrhea    Subjective     History of Present Illness:  Georgie Winston is a 34 y.o. female with prior diagnosis of IBS with alternating diarrhea and constipation, who presents today for further evaluation of abdominal bloating and diarrhea that has been present for several years.  Of note, she has a long history of interstitial cystitis, cervical dysplasia, endometriosis, chronic pelvic pain, and overactive bladder.  She is followed by gynecology and urology for these issues.      She has previously tried low FODMAP diet, lactose free, gluten free, without improvement in her symptoms.  She does feel like she gets some relief from turmeric and Gas-X.  She is also going to pelvic floor therapy regularly. She was recently prescribed dicyclomine for her symptoms, but she has not yet started.  She reports her symptoms are very distressing, as they have also negatively impacted her ability to have intercourse.  She describes \"bowel spasms\" during sex, that make her feel like she constantly has to have a bowel movement.  She was prescribed baclofen suppository by another provider for this.    EGD/colon 7 years ago WNL.  Denies any personal or family history of colon cancer or IBD.  She has previously had a negative celiac panel.  She denies any dysphagia, odynophagia, nausea, vomiting, early satiety, unintentional weight loss, or melena.    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    Ovarian cyst     Panic attack     Parsonage-Lozano syndrome     left shoulder    Rash and other nonspecific skin eruption     Thoracic nerve injury     left    Urinary tract infection        Past Surgical History:   Past Surgical History:   Procedure Laterality Date    ADENOIDECTOMY  1994    CERVICAL CONE BIOPSY  2023     SECTION N/A 2017    Procedure:  SECTION PRIMARY;  Surgeon: Antione Vazquez MD;  Location: [facility] LABOR DELIVERY;  Service:      SECTION      CYSTOSCOPY  2023    ENDOSCOPY AND COLONOSCOPY      PELVIC LAPAROSCOPY  2023    TONSILLECTOMY Bilateral     UMBILICAL HERNIA REPAIR      WISDOM TOOTH EXTRACTION         Immunizations:   Immunization History   Administered Date(s) Administered    Fluzone (or Fluarix & Flulaval for VFC) >6mos 10/22/2019, 2020, 2021    Hepatitis B Adult/Adolescent IM 10/23/2015    Hpv9 10/23/2015    Influenza Injectable Mdck Pf Quad 2022    Influenza, Unspecified 2020, 2021    Tdap 2019, 10/22/2019        Medications:     Current Outpatient Medications:     amitriptyline (ELAVIL) 10 MG tablet, Take 0.5 tablets by mouth Every Night., Disp: 15 tablet, Rfl: 11    dicyclomine (BENTYL) 10 MG capsule, Take 1 capsule by mouth 3 (Three) Times a Day As Needed for Abdominal Cramping., Disp: 90 capsule, Rfl: 1    hydrOXYzine (ATARAX) 25 MG tablet, Take 1 tablet by mouth Every Night., Disp: 90 tablet, Rfl: 3    Loratadine (CLARITIN PO), Take  by mouth., Disp: , Rfl:     norethindrone-ethinyl estradiol FE (Junel FE 1/20) 1-20 MG-MCG per tablet, Take 1 tablet by mouth Daily., Disp: 28 tablet, Rfl: 12    phenazopyridine (Pyridium) 200 MG tablet, Take 1 tablet by mouth 3 (Three) Times a Day As Needed for Bladder Spasms., Disp: 20 tablet, Rfl: 0    valACYclovir (VALTREX) 1000 MG tablet, TAKE 1 TABLET BY MOUTH  "DAILY, Disp: 30 tablet, Rfl: 0    Allergies:   Allergies   Allergen Reactions    Contrast Dye (Echo Or Unknown Ct/Mr) Anaphylaxis     Premedication for CT scan with prednisone, Benadryl, and Tagamet did not work for patient, she still had hives and swelling after contrast.    Shellfish Allergy Anaphylaxis    Shellfish-Derived Products Anaphylaxis    Amoxicillin Other (See Comments)     Yeast inf    Ciprofloxacin Hcl Mental Status Change     Changes personality \"altered state of mind; I become violent & can't control my actions.\"    Erythromycin Diarrhea and Nausea And Vomiting    Gabapentin GI Intolerance    Zithromax [Azithromycin] Nausea And Vomiting     NVD    Demerol [Meperidine] Hives     IV after surgery    Flagyl [Metronidazole] Rash       Family History:   Family History   Problem Relation Age of Onset    Hypertension Father     Diabetes Father     Heart attack Father     Osteoporosis Mother     Hypertension Mother     Diabetes Mother     Colon polyps Mother     Skin cancer Mother     Osteoporosis Maternal Aunt     Alcohol abuse Maternal Uncle     Liver disease Maternal Uncle     Colon cancer Neg Hx     Liver cancer Neg Hx     Stomach cancer Neg Hx     Esophageal cancer Neg Hx     Breast cancer Neg Hx     Ovarian cancer Neg Hx     Pancreatic cancer Neg Hx     Prostate cancer Neg Hx        Social History:   Social History     Socioeconomic History    Marital status: Single   Tobacco Use    Smoking status: Former     Current packs/day: 0.00     Average packs/day: 0.5 packs/day for 3.0 years (1.5 ttl pk-yrs)     Types: Cigarettes     Start date: 2013     Quit date: 2016     Years since quittin.6     Passive exposure: Never    Smokeless tobacco: Never    Tobacco comments:     quit 2016   Vaping Use    Vaping status: Former    Substances: Nicotine, Flavoring    Devices: Pre-filled or refillable cartridge   Substance and Sexual Activity    Alcohol use: Not Currently    Drug use: No    Sexual " "activity: Yes     Partners: Male     Birth control/protection: OCP         Objective     Vital Signs  /82 (BP Location: Right arm, Patient Position: Sitting, Cuff Size: Adult)   Pulse 81   Temp 97.7 °F (36.5 °C) (Temporal)   Ht 160 cm (62.99\")   Wt 66.3 kg (146 lb 3.2 oz)   SpO2 99%   BMI 25.91 kg/m²   Estimated body mass index is 25.91 kg/m² as calculated from the following:    Height as of this encounter: 160 cm (62.99\").    Weight as of this encounter: 66.3 kg (146 lb 3.2 oz).            Physical Exam  Vitals reviewed.   Constitutional:       General: She is awake.   Cardiovascular:      Rate and Rhythm: Normal rate.   Pulmonary:      Effort: Pulmonary effort is normal.   Abdominal:      Palpations: Abdomen is soft.      Tenderness: There is abdominal tenderness in the right upper quadrant.   Skin:     General: Skin is warm and dry.   Neurological:      Mental Status: She is alert.          Assessment and Plan     Assessment & Plan  Irritable bowel syndrome with diarrhea  Low suspicion for infectious etiology, given duration of symptoms, but will check GI panel PCR, C. difficile, ova and parasite to rule out.  Fecal calprotectin to assess for bowel inflammation, as well as fecal elastase to evaluate pancreatic function.  I suspect that some of her gynecological issues may be contributing to bloating and abdominal pain.  Further recommendations pending clinical course and lab results.      Right upper quadrant abdominal pain  RUQ tenderness noted on exam.  Will get an ultrasound of her gallbladder further evaluation.             Follow Up  Follow-up in 6 months, or sooner if needed, pending lab and imaging results.    ROD Drake  [facility]  [facility] GASTROENTEROLOG[facility]  [address] [place] [phone]    "

## 2024-07-31 NOTE — ASSESSMENT & PLAN NOTE
Low suspicion for infectious etiology, given duration of symptoms, but will check GI panel PCR, C. difficile, ova and parasite to rule out.  Fecal calprotectin to assess for bowel inflammation, as well as fecal elastase to evaluate pancreatic function.  I suspect that some of her gynecological issues may be contributing to bloating and abdominal pain.  Further recommendations pending clinical course and lab results.

## 2024-07-31 NOTE — PATIENT INSTRUCTIONS
Recommend bowel cleanse with Miralax, which is over the counter. Dissolve 5 capfuls into 32 ounces of sugar-free gatorade/powerade and drink 8 ounces every 30 minutes until gone. Follow this with regular use of Miralax, titrating to achieve/maintain soft/formed bowel movements. Miralax is safe to use long-term. You can also repeat the bowel cleanse as needed.   Recommend 25-30 grams of fiber daily. May use Fibercon tablets to supplement as needed. Fibercon is less likely to cause gas/bloating.   Drink 64-80 ounces of water daily, with ideally half of that containing electrolytes (sugar free gatorade/powerade, electrolyte tablets)  Exercise is helpful for maintaining healthy bowel habits.

## 2024-08-02 ENCOUNTER — LAB (OUTPATIENT)
Dept: LAB | Facility: HOSPITAL | Age: 34
End: 2024-08-02
Payer: MEDICAID

## 2024-08-02 DIAGNOSIS — K58.0 IRRITABLE BOWEL SYNDROME WITH DIARRHEA: ICD-10-CM

## 2024-08-02 LAB
ADV 40+41 DNA STL QL NAA+NON-PROBE: NOT DETECTED
ALBUMIN SERPL-MCNC: 4.2 G/DL (ref 3.5–5.2)
ALBUMIN/GLOB SERPL: 1.5 G/DL
ALP SERPL-CCNC: 39 U/L (ref 39–117)
ALT SERPL W P-5'-P-CCNC: 13 U/L (ref 1–33)
ANION GAP SERPL CALCULATED.3IONS-SCNC: 11.7 MMOL/L (ref 5–15)
AST SERPL-CCNC: 18 U/L (ref 1–32)
ASTRO TYP 1-8 RNA STL QL NAA+NON-PROBE: NOT DETECTED
BASOPHILS # BLD AUTO: 0.03 10*3/MM3 (ref 0–0.2)
BASOPHILS NFR BLD AUTO: 0.5 % (ref 0–1.5)
BILIRUB SERPL-MCNC: 0.2 MG/DL (ref 0–1.2)
BUN SERPL-MCNC: 7 MG/DL (ref 6–20)
BUN/CREAT SERPL: 7.5 (ref 7–25)
C CAYETANENSIS DNA STL QL NAA+NON-PROBE: NOT DETECTED
C COLI+JEJ+UPSA DNA STL QL NAA+NON-PROBE: NOT DETECTED
C DIFF TOX GENS STL QL NAA+PROBE: NOT DETECTED
CALCIUM SPEC-SCNC: 9.1 MG/DL (ref 8.6–10.5)
CHLORIDE SERPL-SCNC: 104 MMOL/L (ref 98–107)
CHOLEST SERPL-MCNC: 233 MG/DL (ref 0–200)
CO2 SERPL-SCNC: 23.3 MMOL/L (ref 22–29)
CREAT SERPL-MCNC: 0.93 MG/DL (ref 0.57–1)
CRYPTOSP DNA STL QL NAA+NON-PROBE: NOT DETECTED
DEPRECATED RDW RBC AUTO: 44.2 FL (ref 37–54)
E HISTOLYT DNA STL QL NAA+NON-PROBE: NOT DETECTED
EAEC PAA PLAS AGGR+AATA ST NAA+NON-PRB: NOT DETECTED
EC STX1+STX2 GENES STL QL NAA+NON-PROBE: NOT DETECTED
EGFRCR SERPLBLD CKD-EPI 2021: 82.9 ML/MIN/1.73
EOSINOPHIL # BLD AUTO: 0.06 10*3/MM3 (ref 0–0.4)
EOSINOPHIL NFR BLD AUTO: 1.1 % (ref 0.3–6.2)
EPEC EAE GENE STL QL NAA+NON-PROBE: NOT DETECTED
ERYTHROCYTE [DISTWIDTH] IN BLOOD BY AUTOMATED COUNT: 12.9 % (ref 12.3–15.4)
ETEC LTA+ST1A+ST1B TOX ST NAA+NON-PROBE: NOT DETECTED
G LAMBLIA DNA STL QL NAA+NON-PROBE: NOT DETECTED
GLOBULIN UR ELPH-MCNC: 2.8 GM/DL
GLUCOSE SERPL-MCNC: 102 MG/DL (ref 65–99)
HCT VFR BLD AUTO: 41.7 % (ref 34–46.6)
HDLC SERPL-MCNC: 49 MG/DL (ref 40–60)
HGB BLD-MCNC: 13.8 G/DL (ref 12–15.9)
IMM GRANULOCYTES # BLD AUTO: 0.03 10*3/MM3 (ref 0–0.05)
IMM GRANULOCYTES NFR BLD AUTO: 0.5 % (ref 0–0.5)
LDLC SERPL CALC-MCNC: 166 MG/DL (ref 0–100)
LDLC/HDLC SERPL: 3.33 {RATIO}
LYMPHOCYTES # BLD AUTO: 2.23 10*3/MM3 (ref 0.7–3.1)
LYMPHOCYTES NFR BLD AUTO: 39.4 % (ref 19.6–45.3)
MCH RBC QN AUTO: 31.1 PG (ref 26.6–33)
MCHC RBC AUTO-ENTMCNC: 33.1 G/DL (ref 31.5–35.7)
MCV RBC AUTO: 93.9 FL (ref 79–97)
MONOCYTES # BLD AUTO: 0.41 10*3/MM3 (ref 0.1–0.9)
MONOCYTES NFR BLD AUTO: 7.2 % (ref 5–12)
NEUTROPHILS NFR BLD AUTO: 2.9 10*3/MM3 (ref 1.7–7)
NEUTROPHILS NFR BLD AUTO: 51.3 % (ref 42.7–76)
NOROVIRUS GI+II RNA STL QL NAA+NON-PROBE: NOT DETECTED
NRBC BLD AUTO-RTO: 0 /100 WBC (ref 0–0.2)
P SHIGELLOIDES DNA STL QL NAA+NON-PROBE: NOT DETECTED
PLATELET # BLD AUTO: 320 10*3/MM3 (ref 140–450)
PMV BLD AUTO: 9.7 FL (ref 6–12)
POTASSIUM SERPL-SCNC: 4.2 MMOL/L (ref 3.5–5.2)
PROT SERPL-MCNC: 7 G/DL (ref 6–8.5)
RBC # BLD AUTO: 4.44 10*6/MM3 (ref 3.77–5.28)
RVA RNA STL QL NAA+NON-PROBE: NOT DETECTED
S ENT+BONG DNA STL QL NAA+NON-PROBE: NOT DETECTED
SAPO I+II+IV+V RNA STL QL NAA+NON-PROBE: NOT DETECTED
SHIGELLA SP+EIEC IPAH ST NAA+NON-PROBE: NOT DETECTED
SODIUM SERPL-SCNC: 139 MMOL/L (ref 136–145)
TRIGL SERPL-MCNC: 103 MG/DL (ref 0–150)
TSH SERPL DL<=0.05 MIU/L-ACNC: 0.85 UIU/ML (ref 0.27–4.2)
V CHOL+PARA+VUL DNA STL QL NAA+NON-PROBE: NOT DETECTED
V CHOLERAE DNA STL QL NAA+NON-PROBE: NOT DETECTED
VLDLC SERPL-MCNC: 18 MG/DL (ref 5–40)
WBC NRBC COR # BLD AUTO: 5.66 10*3/MM3 (ref 3.4–10.8)
Y ENTEROCOL DNA STL QL NAA+NON-PROBE: NOT DETECTED

## 2024-08-02 PROCEDURE — 87177 OVA AND PARASITES SMEARS: CPT

## 2024-08-02 PROCEDURE — 87493 C DIFF AMPLIFIED PROBE: CPT

## 2024-08-02 PROCEDURE — 36415 COLL VENOUS BLD VENIPUNCTURE: CPT | Performed by: STUDENT IN AN ORGANIZED HEALTH CARE EDUCATION/TRAINING PROGRAM

## 2024-08-02 PROCEDURE — 80061 LIPID PANEL: CPT | Performed by: STUDENT IN AN ORGANIZED HEALTH CARE EDUCATION/TRAINING PROGRAM

## 2024-08-02 PROCEDURE — 82653 EL-1 FECAL QUANTITATIVE: CPT

## 2024-08-02 PROCEDURE — 83993 ASSAY FOR CALPROTECTIN FECAL: CPT

## 2024-08-02 PROCEDURE — 80053 COMPREHEN METABOLIC PANEL: CPT | Performed by: STUDENT IN AN ORGANIZED HEALTH CARE EDUCATION/TRAINING PROGRAM

## 2024-08-02 PROCEDURE — 87507 IADNA-DNA/RNA PROBE TQ 12-25: CPT

## 2024-08-02 PROCEDURE — 85025 COMPLETE CBC W/AUTO DIFF WBC: CPT | Performed by: STUDENT IN AN ORGANIZED HEALTH CARE EDUCATION/TRAINING PROGRAM

## 2024-08-02 PROCEDURE — 84443 ASSAY THYROID STIM HORMONE: CPT | Performed by: STUDENT IN AN ORGANIZED HEALTH CARE EDUCATION/TRAINING PROGRAM

## 2024-08-02 PROCEDURE — 87209 SMEAR COMPLEX STAIN: CPT

## 2024-08-02 PROCEDURE — 86376 MICROSOMAL ANTIBODY EACH: CPT | Performed by: STUDENT IN AN ORGANIZED HEALTH CARE EDUCATION/TRAINING PROGRAM

## 2024-08-03 LAB — THYROPEROXIDASE AB SERPL-ACNC: <9 IU/ML (ref 0–34)

## 2024-08-05 DIAGNOSIS — Z00.00 ENCOUNTER FOR PREVENTIVE CARE: Primary | ICD-10-CM

## 2024-08-05 DIAGNOSIS — R73.9 HYPERGLYCEMIA: ICD-10-CM

## 2024-08-05 DIAGNOSIS — F41.9 ANXIETY: ICD-10-CM

## 2024-08-05 LAB — ELASTASE PANC STL-MCNT: 637 UG ELAST./G

## 2024-08-06 LAB — CALPROTECTIN STL-MCNT: <5 UG/G (ref 0–120)

## 2024-08-09 LAB
O+P SPEC MICRO: NORMAL
O+P STL CONC: NORMAL

## 2024-08-28 ENCOUNTER — HOSPITAL ENCOUNTER (OUTPATIENT)
Facility: HOSPITAL | Age: 34
Discharge: HOME | End: 2024-08-28
Payer: MEDICAID

## 2024-08-28 DIAGNOSIS — K58.0 IRRITABLE BOWEL SYNDROME WITH DIARRHEA: ICD-10-CM

## 2024-08-28 PROCEDURE — 76705 ECHO EXAM OF ABDOMEN: CPT

## 2024-09-25 ENCOUNTER — OFFICE VISIT (OUTPATIENT)
Dept: UROLOGY | Facility: CLINIC | Age: 34
End: 2024-09-25
Payer: MEDICAID

## 2024-09-25 VITALS
SYSTOLIC BLOOD PRESSURE: 136 MMHG | DIASTOLIC BLOOD PRESSURE: 82 MMHG | OXYGEN SATURATION: 97 % | HEIGHT: 63 IN | BODY MASS INDEX: 25.87 KG/M2 | WEIGHT: 146 LBS | TEMPERATURE: 97.3 F | HEART RATE: 80 BPM

## 2024-09-25 DIAGNOSIS — N32.81 OAB (OVERACTIVE BLADDER): ICD-10-CM

## 2024-09-25 DIAGNOSIS — N30.10 INTERSTITIAL CYSTITIS: Primary | ICD-10-CM

## 2024-09-25 DIAGNOSIS — M62.89 PELVIC FLOOR DYSFUNCTION IN FEMALE: ICD-10-CM

## 2024-09-25 LAB
BILIRUB BLD-MCNC: NEGATIVE MG/DL
CLARITY, POC: CLEAR
COLOR UR: YELLOW
EXPIRATION DATE: NORMAL
GLUCOSE UR STRIP-MCNC: NEGATIVE MG/DL
KETONES UR QL: NEGATIVE
LEUKOCYTE EST, POC: NEGATIVE
Lab: NORMAL
NITRITE UR-MCNC: NEGATIVE MG/ML
PH UR: 6 [PH] (ref 5–8)
PROT UR STRIP-MCNC: NEGATIVE MG/DL
RBC # UR STRIP: NEGATIVE /UL
SP GR UR: 1.02 (ref 1–1.03)
UROBILINOGEN UR QL: NORMAL

## 2024-09-25 RX ORDER — FESOTERODINE FUMARATE 4 MG/1
4 TABLET, FILM COATED, EXTENDED RELEASE ORAL
Qty: 30 TABLET | Refills: 3 | Status: SHIPPED | OUTPATIENT
Start: 2024-09-25

## 2024-09-25 RX ORDER — ONDANSETRON 4 MG/1
TABLET, ORALLY DISINTEGRATING ORAL
COMMUNITY
Start: 2024-09-14

## 2024-11-21 ENCOUNTER — TRANSCRIBE ORDERS (OUTPATIENT)
Dept: ADMINISTRATIVE | Facility: HOSPITAL | Age: 34
End: 2024-11-21
Payer: MEDICAID

## 2024-11-21 DIAGNOSIS — L98.9 SKIN LESION: Primary | ICD-10-CM

## 2024-12-20 ENCOUNTER — HOSPITAL ENCOUNTER (OUTPATIENT)
Dept: ULTRASOUND IMAGING | Facility: HOSPITAL | Age: 34
Discharge: HOME | End: 2024-12-20
Payer: MEDICAID

## 2024-12-20 DIAGNOSIS — L98.9 SKIN LESION: ICD-10-CM

## 2024-12-20 PROCEDURE — 76705 ECHO EXAM OF ABDOMEN: CPT

## 2025-01-02 RX ORDER — NORETHINDRONE ACETATE AND ETHINYL ESTRADIOL 1MG-20(21)
1 KIT ORAL DAILY
Qty: 28 TABLET | Refills: 12 | OUTPATIENT
Start: 2025-01-02 | End: 2026-01-02

## 2025-01-02 NOTE — TELEPHONE ENCOUNTER
Pt informed and stated understanding that the prescription does not need to be refilled at this time.

## 2025-01-07 ENCOUNTER — TELEMEDICINE (OUTPATIENT)
Dept: UROLOGY | Facility: CLINIC | Age: 35
End: 2025-01-07
Payer: MEDICAID

## 2025-01-07 DIAGNOSIS — N32.81 OAB (OVERACTIVE BLADDER): Primary | ICD-10-CM

## 2025-01-07 DIAGNOSIS — N30.10 IC (INTERSTITIAL CYSTITIS): ICD-10-CM

## 2025-01-07 PROCEDURE — 1159F MED LIST DOCD IN RCRD: CPT | Performed by: NURSE PRACTITIONER

## 2025-01-07 PROCEDURE — 1160F RVW MEDS BY RX/DR IN RCRD: CPT | Performed by: NURSE PRACTITIONER

## 2025-01-07 PROCEDURE — 99214 OFFICE O/P EST MOD 30 MIN: CPT | Performed by: NURSE PRACTITIONER

## 2025-01-07 RX ORDER — VIBEGRON 75 MG/1
75 TABLET, FILM COATED ORAL DAILY
Qty: 30 TABLET | Refills: 11 | Status: SHIPPED | OUTPATIENT
Start: 2025-01-07

## 2025-01-07 NOTE — PROGRESS NOTES
Video Visit     Patient Name: Georgie Winston  : 1990   MRN: [id number]     Chief Complaint: No chief complaint on file.      History of Present Illness: Georgie Winston is a 34 y.o. female who presents today for OAB and IC   Video visit conducted using video and audio.    Patient agrees to visit via video and is located in the [facility] at home.  Location of provider: [facility] [place]  Patient has chosen to receive care through a telehealth visit.  No technical issues occurred during this visit.     Patient reports she was unable to tolerate Toviaz and has discontinued taking medication.  She is now trialed and failed Toviaz and oxybutynin.  She does report she recently had a sinus infection and had to take the steroid noticed this improved her bladder pain.      Subjective      Review of System:   As noted in HPI    Medications:     Current Outpatient Medications:     amitriptyline (ELAVIL) 10 MG tablet, Take 0.5 tablets by mouth Every Night., Disp: 15 tablet, Rfl: 11    dicyclomine (BENTYL) 10 MG capsule, Take 1 capsule by mouth 3 (Three) Times a Day As Needed for Abdominal Cramping., Disp: 90 capsule, Rfl: 1    hydrOXYzine (ATARAX) 25 MG tablet, Take 1 tablet by mouth Every Night., Disp: 90 tablet, Rfl: 3    Loratadine (CLARITIN PO), Take  by mouth., Disp: , Rfl:     norethindrone-ethinyl estradiol FE (Junel FE 1/20) 1-20 MG-MCG per tablet, Take 1 tablet by mouth Daily., Disp: 28 tablet, Rfl: 12    ondansetron ODT (ZOFRAN-ODT) 4 MG disintegrating tablet, DISSOLVE 1 TABLET ON TONGUE FOUR TIMES DAILY AS NEEDED FOR NAUSEA AND VOMITING, Disp: , Rfl:     phenazopyridine (Pyridium) 200 MG tablet, Take 1 tablet by mouth 3 (Three) Times a Day As Needed for Bladder Spasms., Disp: 20 tablet, Rfl: 0    valACYclovir (VALTREX) 1000 MG tablet, TAKE 1 TABLET BY MOUTH DAILY, Disp: 30 tablet, Rfl: 0    Vibegron (Gemtesa) 75 MG tablet, Take 1 tablet by mouth Daily., Disp: 30 tablet, Rfl:  "11    Allergies:   Allergies   Allergen Reactions    Contrast Dye (Echo Or Unknown Ct/Mr) Anaphylaxis     Premedication for CT scan with prednisone, Benadryl, and Tagamet did not work for patient, she still had hives and swelling after contrast.    Shellfish Allergy Anaphylaxis    Shellfish-Derived Products Anaphylaxis    Amoxicillin Other (See Comments)     Yeast inf    Ciprofloxacin Hcl Mental Status Change     Changes personality \"altered state of mind; I become violent & can't control my actions.\"    Erythromycin Diarrhea and Nausea And Vomiting    Gabapentin GI Intolerance    Zithromax [Azithromycin] Nausea And Vomiting     NVD    Demerol [Meperidine] Hives     IV after surgery    Flagyl [Metronidazole] Rash       Objective     Physical Exam:   Vital Signs: There were no vitals filed for this visit.  There is no height or weight on file to calculate BMI.     Physical Exam  Neurological:      Mental Status: She is alert.   Psychiatric:         Attention and Perception: Attention normal.         Mood and Affect: Mood normal.          Labs:   Brief Urine Lab Results  (Last result in the past 365 days)        Color   Clarity   Blood   Leuk Est   Nitrite   Protein   CREAT   Urine HCG        09/25/24 1042 Yellow   Clear   Negative   Negative   Negative   Negative                        Lab Results   Component Value Date    GLUCOSE 102 (H) 08/02/2024    CALCIUM 9.1 08/02/2024     08/02/2024    K 4.2 08/02/2024    CO2 23.3 08/02/2024     08/02/2024    BUN 7 08/02/2024    CREATININE 0.93 08/02/2024    EGFRIFNONA 120 06/10/2017    BCR 7.5 08/02/2024    ANIONGAP 11.7 08/02/2024       Lab Results   Component Value Date    WBC 5.66 08/02/2024    HGB 13.8 08/02/2024    HCT 41.7 08/02/2024    MCV 93.9 08/02/2024     08/02/2024       Images:   US Abdomen Limited    Result Date: 12/20/2024  Palpable abnormality in the left upper quadrant soft tissues is likely a sebaceous cyst. Consider follow-up to document " stability.    Images were reviewed, interpreted, and dictated by Dr. Oneyda Ramirez MD Transcribed by Mary Hines PA-C.  This report was signed and finalized on 12/20/2024 12:22 PM by Oneyda Ramirez MD.        Assessment / Plan      Assessment/Plan:   Diagnoses and all orders for this visit:    1. OAB (overactive bladder) (Primary)  -     Vibegron (Gemtesa) 75 MG tablet; Take 1 tablet by mouth Daily.  Dispense: 30 tablet; Refill: 11    2. IC (interstitial cystitis)     Georgiemt Cano a 34 y.o. female who presents today for OAB and IC.We discussed recommendations and that chronic oral steroid use is not indicated for IC but she can do bladder cocktails which have a steroid in it weekly for 4 weeks to see if this will improve bladder symptoms.    Discontinue Toviaz        -Start Gemtesa 75 mg daily  Continue amitriptyline and hydroxyzine nightly         -Bladder cocktails weekly x 4 weeks at her discretion         -Continue baclofen suppositories 10 mg prior to PFPT and               intercourse    Follow Up:   Return in about 3 months (around 4/7/2025) for Follow up ROD Roper, NP-C  [facility] [place]

## 2025-01-09 ENCOUNTER — TELEPHONE (OUTPATIENT)
Dept: UROLOGY | Facility: CLINIC | Age: 35
End: 2025-01-09
Payer: MEDICAID

## 2025-01-09 NOTE — TELEPHONE ENCOUNTER
Pt is calling to set up bladder cocktails evangelina.  Is this what you wanted for her...please advise.

## 2025-01-15 ENCOUNTER — PROCEDURE VISIT (OUTPATIENT)
Dept: UROLOGY | Facility: CLINIC | Age: 35
End: 2025-01-15
Payer: MEDICAID

## 2025-01-15 DIAGNOSIS — N30.10 IC (INTERSTITIAL CYSTITIS): Primary | ICD-10-CM

## 2025-01-15 LAB
BILIRUB BLD-MCNC: NEGATIVE MG/DL
CLARITY, POC: CLEAR
COLOR UR: YELLOW
EXPIRATION DATE: NORMAL
GLUCOSE UR STRIP-MCNC: NEGATIVE MG/DL
KETONES UR QL: NEGATIVE
LEUKOCYTE EST, POC: NEGATIVE
Lab: NORMAL
NITRITE UR-MCNC: NEGATIVE MG/ML
PH UR: 5.5 [PH] (ref 5–8)
PROT UR STRIP-MCNC: NEGATIVE MG/DL
RBC # UR STRIP: NEGATIVE /UL
SP GR UR: 1.02 (ref 1–1.03)
UROBILINOGEN UR QL: NORMAL

## 2025-01-15 NOTE — PROGRESS NOTES
Patient here today for nurse visit for bladder cocktail. UA was checked and was negative. I mixed 10 ml heparin 01678 units, 20 ml lidocaine 2%, 25 ml sodium bicarb 8.4%, and 40 mg kenalog into a sterile container and adrienne it up into a sterile syringe. Patient was asked of any allergies to betadine, iodine or shellfish and denied any allergy. Patient was prepped with betadine and then I inserted a 14 fr silicone in and out catheter into urethra once bladder was emptied I inserted syringe onto catheter and pushed the medication through and then removed the catheter. Patient tolerated procedure well with no complications or concerns. Patient was advised to wait 15 minutes prior to leaving the office.    STEPAN Mcdaniel

## 2025-01-22 ENCOUNTER — TELEPHONE (OUTPATIENT)
Dept: UROLOGY | Facility: CLINIC | Age: 35
End: 2025-01-22

## 2025-01-22 NOTE — TELEPHONE ENCOUNTER
Caller: Georgie Winston     Relationship: SELF    Best call back number: [phone]     What is the best time to reach you: ANYTIME    Who are you requesting to speak with (clinical staff, provider,  specific staff member): CLINICAL    Do you know the name of the person who called: INCOMING CALL    What was the call regarding: PT HAS AN APPT WITH THE NURSE TODAY FOR A BLADDER COCKTAIL. PT NEEDS TO RESCHEDULE DUE TO NO CHILDCARE TODAY.    Is it okay if the provider responds through MyChart: NO

## 2025-01-22 NOTE — TELEPHONE ENCOUNTER
Called patient and LVM to inform her that we will start her bladder cocktail next week and then just add a week onto her last visit    Sussy EVERETT

## 2025-01-29 ENCOUNTER — PROCEDURE VISIT (OUTPATIENT)
Dept: UROLOGY | Facility: CLINIC | Age: 35
End: 2025-01-29
Payer: MEDICAID

## 2025-01-29 DIAGNOSIS — N30.10 IC (INTERSTITIAL CYSTITIS): Primary | ICD-10-CM

## 2025-01-29 LAB
BILIRUB BLD-MCNC: NEGATIVE MG/DL
CLARITY, POC: CLEAR
COLOR UR: YELLOW
EXPIRATION DATE: ABNORMAL
GLUCOSE UR STRIP-MCNC: NEGATIVE MG/DL
KETONES UR QL: NEGATIVE
LEUKOCYTE EST, POC: NEGATIVE
Lab: ABNORMAL
NITRITE UR-MCNC: NEGATIVE MG/ML
PH UR: 5.5 [PH] (ref 5–8)
PROT UR STRIP-MCNC: ABNORMAL MG/DL
RBC # UR STRIP: ABNORMAL /UL
SP GR UR: 1.02 (ref 1–1.03)
UROBILINOGEN UR QL: NORMAL

## 2025-01-29 NOTE — PROGRESS NOTES
A size 14F temporary catheter inserted and bladder cocktail (heparin, 8.4% Sodium Bicarbonate, 2% Lidocaine, and Kenalog-40 Rimso-50) given per provider prescription. Indication: Lower urinary symptoms. Procedure and purpose of catheter and bladder cocktail explained to patient. Patient denies allergies to iodine, latex, orthopedic limitations, or previous genitourinary surgeries. Patient verbalized no discomfort or pain during the procedure. Matilde-care provided before and after procedure.  Patient laid in supine position for 15 minutes comfortably; instructed the patient to notify the clinical staff if develops any bladder pain, discomfort, or spasms. Patient verbalized understanding.     Sussy EVERETT

## 2025-04-09 ENCOUNTER — OFFICE VISIT (OUTPATIENT)
Dept: UROLOGY | Facility: CLINIC | Age: 35
End: 2025-04-09
Payer: MEDICAID

## 2025-04-09 VITALS
OXYGEN SATURATION: 100 % | DIASTOLIC BLOOD PRESSURE: 80 MMHG | SYSTOLIC BLOOD PRESSURE: 130 MMHG | HEIGHT: 62 IN | TEMPERATURE: 98.4 F | BODY MASS INDEX: 26.68 KG/M2 | WEIGHT: 145 LBS | HEART RATE: 78 BPM

## 2025-04-09 DIAGNOSIS — N30.10 IC (INTERSTITIAL CYSTITIS): Primary | ICD-10-CM

## 2025-04-09 DIAGNOSIS — N32.81 OAB (OVERACTIVE BLADDER): ICD-10-CM

## 2025-04-09 NOTE — PROGRESS NOTES
Office Visit     Patient Name: Georgie Winston  : 1990   MRN: [id number]     Patient or patient representative verbalized consent for the use of Ambient Listening during the visit with  ROD Benitez for chart documentation. 2025  11:05 EDT    Chief Complaint:   Chief Complaint   Patient presents with    Follow-up     IC (interstitial cystitis)       Referring Provider: No ref. provider found    Primary Care Provider: Allyssa Aviles MD     History of Present Illness  The patient is a 35-year-old female here for follow-up.    Improvement after medication change  - Reports significant improvement after discontinuing Toviaz in January and starting Gemtesa  - 50% reduction in frequency and pain  - Improved sleep and quality of life  - Satisfied with the current treatment and does not want changes    Current medications  - Takes amitriptyline nightly (5 mg or 2.5 mg)  - Takes hydroxyzine as needed or nightly  - Has not been using baclofen suppositories but plans to increase usage  - Needs refills on these medications    MEDICATIONS  Current: Gemtesa, amitriptyline, hydroxyzine, baclofen suppositories. Discontinued: Toviaz.      Subjective   Review of System:   As noted in HPI.    Past Medical History:   Diagnosis Date    Abnormal Pap smear of cervix age 18    HPV, normal Paps since    Anxiety and depression 2013    Asthma     Back pain 2013    Depression     Endometriosis     Fatigue     Fibroid     Fibromyalgia 2013    Genital herpes during pregnancy     last outbreak was @ conception    GERD (gastroesophageal reflux disease)     H/O colonoscopy with polypectomy 2016    Head ache     Herpes     2012    HPV (human papilloma virus) infection     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    Ovarian cyst     Panic attack 2013    Parsonage-Lozano syndrome     left shoulder    Rash and other nonspecific skin eruption     Thoracic nerve injury     left    Urinary tract infection  HOSPITALIST H&P/CONSULT 
NAME:  Tone Dumont Age:  46 y.o. 
:   1967 MRN:   [id number] PCP: Tawanda West MD 
Consulting MD: Treatment Team: Attending Provider: Paco Rodriguez MD 
HPI:  
 
This is a 45 YO female patient with an extensive PMH of NSTEMI secondary to cocaine use in , chronic systolic CHF with EF of 21%, Aortic regurgitation, COPD using O2 at home,  HTN, chronic DVT with PE in the past on chronic anticoagulation, history of previous GI bleed who came to the ER with a CC of persistent cough and SOB. She has a history of chronic cough but in the last 5 days she has developed worsening SOB, cough and malaise. Denies sputum production. She came to the ER 2 days ago and was given zithromax and steroids and was discharged home. She did not improve and today she came back complaining of worse symptoms. In the ER her VS were stable. Her physical exam reported minor wheezing and persistent cough. Her WBC and electrolytes were WNL. A drug screen in urine was positive for cocaine. A CT scan of the chest reported bilateral upper pneumonia. The patient received one dose of levaquin in the ER and was presented to the hospitalist team for admission. Case was discussed with ER MD. 
10 point ROS done and is negative except as noted in HPI. Past Medical History:  
Diagnosis Date  Anemia   
 blood transfusion 2018 per pt  Arrhythmia   
 palpitations, moderate mitral valve regurge  Arthritis   
 lower back osteo  Asthma   
 uses inhalers  Cardiomyopathy ECHO 2017  CHF (congestive heart failure) [address] [address]   
 2017 Echo  LVEF 45-50%  Chronic pain 2010  Coagulation defect ([facility] [address]   
 eliquis  Cocaine use Reports last use 2018 per patient  COPD   
 nebulizer daily and maint inhalers, can not climb 1 flight of stairs (also CHF)  oxygen 3 LPM qhs  Decreased cardiac ejection fraction 2017 EF 45-50% per echo 17      Past Surgical History:   Procedure Laterality Date    ADENOIDECTOMY      CERVICAL CONE BIOPSY  2023     SECTION N/A 2017    Procedure:  SECTION PRIMARY;  Surgeon: Antione Vzaquez MD;  Location: [facility] LABOR DELIVERY;  Service:      SECTION      CYSTOSCOPY  2023    ENDOSCOPY AND COLONOSCOPY  2016    PELVIC LAPAROSCOPY  2023    TONSILLECTOMY Bilateral     UMBILICAL HERNIA REPAIR      WISDOM TOOTH EXTRACTION       Family History   Problem Relation Age of Onset    Hypertension Father     Diabetes Father     Heart attack Father     Osteoporosis Mother     Hypertension Mother     Diabetes Mother     Colon polyps Mother     Skin cancer Mother     Osteoporosis Maternal Aunt     Alcohol abuse Maternal Uncle     Liver disease Maternal Uncle     Colon cancer Neg Hx     Liver cancer Neg Hx     Stomach cancer Neg Hx     Esophageal cancer Neg Hx     Breast cancer Neg Hx     Ovarian cancer Neg Hx     Pancreatic cancer Neg Hx     Prostate cancer Neg Hx      Social History     Socioeconomic History    Marital status: Single   Tobacco Use    Smoking status: Former     Current packs/day: 0.00     Average packs/day: 0.5 packs/day for 3.0 years (1.5 ttl pk-yrs)     Types: Cigarettes     Start date: 2013     Quit date: 2016     Years since quittin.3     Passive exposure: Never    Smokeless tobacco: Never    Tobacco comments:     quit 2016   Vaping Use    Vaping status: Former    Substances: Nicotine, Flavoring    Devices: Pre-filled or refillable cartridge   Substance and Sexual Activity    Alcohol use: Not Currently    Drug use: No    Sexual activity: Yes     Partners: Male     Birth control/protection: OCP       Current Outpatient Medications:     amitriptyline (ELAVIL) 10 MG tablet, Take 0.5 tablets by mouth Every Night., Disp: 15 tablet, Rfl: 11    dicyclomine (BENTYL) 10 MG capsule, Take 1 capsule by mouth 3 (Three) Times a Day As Needed for   Depression   
 controlled  Diverticulitis  GERD (gastroesophageal reflux disease)   
 uncontrolled with daily meds-- comes and goes- 3 pillows  Heart murmur  Hypertension   
 managed with meds  IBS (irritable bowel syndrome)  IC (interstitial cystitis)  Insomnia  Migraine with aura and without status migrainosus, not intractable 2016  Mitral valve regurgitation, rheumatic   
 followed Dr. Colten Bunch  Palpitations 2016  Psychiatric disorder   
 anxiety  Requires oxygen therapy 3l/min at hs. prn during the days as needed  Rheumatic aortic insufficiency 2016  Rheumatic fever as child  
 pt reports rheumatic fever in childhood  Smoker   
 started age 21-- smoked 0.5 ppd until -- cut back to 10-2 cig daily per pt  Stroke ([address] \"mild stroke\" pt reports - \"left sided weakness and balance is off\"  Thromboembolus ([facility] [address] BLE   Tobacco abuse disorder 2016  Vitamin D deficiency Past Surgical History:  
Procedure Laterality Date  BIOPSY OF BREAST, INCISIONAL Left   
 lymph node , left, patient states her bx neg, but high risk  COLONOSCOPY    
 8 polyps removed, diverticulitis  CYSTOSCOPY  11  
 bladder dilitation  EGD  HX APPENDECTOMY    HX HEART CATHETERIZATION  2011  
 no blockages, no intervention  HX HEART CATHETERIZATION  2017  
 no intervention  HX LAP CHOLECYSTECTOMY  2011  HX NATASHA AND BSO  2004  
 fibroid tumors, hyst  
 HX UROLOGICAL  2018  
 cystoscopy with hydrodistention of bladder Prior to Admission Medications Prescriptions Last Dose Informant Patient Reported? Taking? EPINEPHrine (EPIPEN JR) 0.15 mg/0.3 mL injection   Yes No  
Si.15 mg by IntraMUSCular route once as needed. FLUoxetine (PROZAC) 20 mg capsule   Yes No  
Sig: Take 1 Cap by mouth daily.   
HYDROcodone-homatropine (HYCODAN) 5-1.5 mg/5 mL (5 mL) syrup   No No  
 "Abdominal Cramping., Disp: 90 capsule, Rfl: 1    hydrOXYzine (ATARAX) 25 MG tablet, Take 1 tablet by mouth Every Night., Disp: 90 tablet, Rfl: 3    Loratadine (CLARITIN PO), Take  by mouth., Disp: , Rfl:     norethindrone-ethinyl estradiol FE (Junel FE 1/20) 1-20 MG-MCG per tablet, Take 1 tablet by mouth Daily., Disp: 28 tablet, Rfl: 12    ondansetron ODT (ZOFRAN-ODT) 4 MG disintegrating tablet, DISSOLVE 1 TABLET ON TONGUE FOUR TIMES DAILY AS NEEDED FOR NAUSEA AND VOMITING, Disp: , Rfl:     phenazopyridine (Pyridium) 200 MG tablet, Take 1 tablet by mouth 3 (Three) Times a Day As Needed for Bladder Spasms., Disp: 20 tablet, Rfl: 0    valACYclovir (VALTREX) 1000 MG tablet, TAKE 1 TABLET BY MOUTH DAILY, Disp: 30 tablet, Rfl: 0    Vibegron (Gemtesa) 75 MG tablet, Take 1 tablet by mouth Daily., Disp: 30 tablet, Rfl: 11    Allergies   Allergen Reactions    Contrast Dye (Echo Or Unknown Ct/Mr) Anaphylaxis     Premedication for CT scan with prednisone, Benadryl, and Tagamet did not work for patient, she still had hives and swelling after contrast.    Shellfish Allergy Anaphylaxis    Shellfish-Derived Products Anaphylaxis    Amoxicillin Other (See Comments)     Yeast inf    Ciprofloxacin Hcl Mental Status Change     Changes personality \"altered state of mind; I become violent & can't control my actions.\"    Erythromycin Diarrhea and Nausea And Vomiting    Gabapentin GI Intolerance    Zithromax [Azithromycin] Nausea And Vomiting     NVD    Demerol [Meperidine] Hives     IV after surgery    Flagyl [Metronidazole] Rash     Objective   Visit Vitals  /80 (BP Location: Left arm, Patient Position: Sitting, Cuff Size: Adult)   Pulse 78   Temp 98.4 °F (36.9 °C) (Temporal)   Ht 157.5 cm (62\")   Wt 65.8 kg (145 lb)   SpO2 100%   BMI 26.52 kg/m²        Body mass index is 26.52 kg/m².     Physical Exam  Vitals and nursing note reviewed.   Constitutional:       General: She is not in acute distress.     Appearance: Normal " Sig: Take 5 mL by mouth four (4) times daily. Max Daily Amount: 20 mL. Nebulizer Accessories kit   No No  
Sig: Use tid Oxygen   Yes No  
Sig: nightly. Indications: 3l/min at hs and prn during the day  
albuterol (PROVENTIL HFA, VENTOLIN HFA, PROAIR HFA) 90 mcg/actuation inhaler   No No  
Sig: Take 2 Puffs by inhalation every six (6) hours as needed for Wheezing. ammonium lactate (LAC-HYDRIN) 12 % lotion   Yes No  
Sig: Apply  to affected area as needed. rub in to affected area well  
apixaban (ELIQUIS) 5 mg tablet   No No  
Sig: Take 1 Tab by mouth two (2) times a day. benzonatate (TESSALON PERLES) 100 mg capsule   No No  
Sig: Take 1 Cap by mouth three (3) times daily as needed for Cough for up to 7 days. budesonide-formoterol (SYMBICORT) 160-4.5 mcg/actuation HFA inhaler   Yes No  
Sig: Take 1 Puff by inhalation two (2) times a day. Indications: BRONCHOSPASM PREVENTION WITH COPD, use on the dos  
carvedilol (COREG) 25 mg tablet   No No  
Sig: Take 1 Tab by mouth two (2) times daily (with meals). clindamycin (CLEOCIN) 300 mg capsule   No No  
Sig: Take 1 Cap by mouth four (4) times daily for 7 days. clonazePAM (KLONOPIN) 0.5 mg tablet   No No  
Sig: TAKE 1 TABLET BY MOUTH 3 TIMES A DAY. MAX 3/DAY diphenhydrAMINE (BANOPHEN) 50 mg capsule   Yes No  
Sig: Take 50 mg by mouth every six (6) hours as needed. estradiol (ESTRACE) 0.5 mg tablet   No No  
Sig: Take 1 Tab by mouth daily. Can d/c the estradiol patches  
gabapentin (NEURONTIN) 400 mg capsule   No No  
Sig: Take 1 Cap by mouth three (3) times daily. Indications: take on the [facility] Patient taking differently: Take 400 mg by mouth three (3) times daily. hydrOXYzine pamoate (VISTARIL) 25 mg capsule   Yes No  
Sig: Take 1 Cap by mouth two (2) times daily as needed. mirtazapine (REMERON) 15 mg tablet   No No  
Sig: Take 1 Tab by mouth nightly.   
nitroglycerin (NITROSTAT) 0.4 mg SL tablet   No No  
 "appearance. She is not ill-appearing.   Pulmonary:      Effort: Pulmonary effort is normal. No respiratory distress.   Skin:     General: Skin is warm and dry.   Neurological:      General: No focal deficit present.      Mental Status: She is alert and oriented to person, place, and time.   Psychiatric:         Mood and Affect: Mood normal.         Behavior: Behavior normal.          Labs  Lab Results   Component Value Date    COLORU Yellow 01/29/2025    CLARITYU Clear 01/29/2025    SPECGRAV 1.025 01/29/2025    PHUR 5.5 01/29/2025    LEUKOCYTESUR Negative 01/29/2025    NITRITE Negative 01/29/2025    PROTEINPOCUA Trace (A) 01/29/2025    GLUCOSEUR Negative 01/29/2025    KETONESU Negative 01/29/2025    UROBILINOGEN Normal 01/29/2025    BILIRUBINUR Negative 01/29/2025    RBCUR Large (A) 01/29/2025      Lab Results   Component Value Date    WBCUA 0-2 (A) 07/10/2017    RBCUA 0-2 07/10/2017    RBCUA 0-3 12/17/2016    BACTERIA None Seen 07/10/2017    BACTERIA TRACE 12/17/2016    HYALCASTU 0-6 07/10/2017    SQUAMEPIUA 0-2 07/10/2017        Lab Results   Component Value Date    WBC 5.66 08/02/2024    HGB 13.8 08/02/2024    HCT 41.7 08/02/2024    MCV 93.9 08/02/2024     08/02/2024     Lab Results   Component Value Date    GLUCOSE 102 (H) 08/02/2024    CALCIUM 9.1 08/02/2024     08/02/2024    K 4.2 08/02/2024    CO2 23.3 08/02/2024     08/02/2024    BUN 7 08/02/2024    BUN 8 07/25/2024    CREATININE 0.93 08/02/2024    CREATININE 1.04 (H) 07/25/2024    EGFR 82.9 08/02/2024    EGFR 72.5 07/25/2024    BCR 7.5 08/02/2024    ANIONGAP 11.7 08/02/2024    ALT 13 08/02/2024    AST 18 08/02/2024     No results found for: \"HGBA1C\"  No results found for: \"URICACIDSTN\", \"SGQI4XODIWF\", \"IVGD2NSUSS\", \"LABMAGN\"  Lab Results   Component Value Date    URICACID 3.7 08/04/2017     Lab Results   Component Value Date    LABPH 7.0 12/17/2016       No results found for: \"ATOPOBIUMV\", \"BVAB2\", \"MEGASPHAER\", \"CALBICANSN\", " Si Tab by SubLINGual route every five (5) minutes as needed for Chest Pain. oxybutynin (DITROPAN) 5 mg tablet   Yes No  
Sig: Take 5 mg by mouth two (2) times a day. pantoprazole (PROTONIX) 40 mg tablet   No No  
Sig: Take 1 Tab by mouth daily. Indications: morning  
pravastatin (PRAVACHOL) 40 mg tablet   No No  
Sig: Take 1 Tab by mouth nightly. predniSONE (STERAPRED DS) 10 mg dose pack   No No  
Sig: Please take as directed on package. Please clarify any questions with the Pharmacist.  
promethazine (PHENERGAN) 25 mg tablet   No No  
Sig: Take 1 Tab by mouth every six (6) hours as needed for Nausea. spironolactone (ALDACTONE) 25 mg tablet   No No  
Sig: Take 1 Tab by mouth daily. tiotropium (SPIRIVA WITH HANDIHALER) 18 mcg inhalation capsule  Self Yes No  
Sig: Take 1 Cap by inhalation daily. Indications: BRONCHOSPASM PREVENTION WITH COPD Facility-Administered Medications: None Home meds reconciled. Allergies Allergen Reactions  Aspirin Other (comments) Inflames IBS per pt  Bentyl [Dicyclomine] Itching  Toradol [Ketorolac] Hives  Ultram [Tramadol] Nausea and Vomiting  Zofran [Ondansetron Hcl (Pf)] Nausea and Vomiting Social History Tobacco Use  Smoking status: Current Every Day Smoker Packs/day: 0.25 Years: 27.00 Pack years: 6.75  Smokeless tobacco: Never Used  Tobacco comment: She hasn't had one in a few days. 10/18/18KH Substance Use Topics  Alcohol use: No  
  
Family History Problem Relation Age of Onset  Heart Failure Father 76 chf  
 Heart Disease Father  Diabetes Sister  Thyroid Disease Sister Immunization History Administered Date(s) Administered  Influenza Vaccine 2009, 10/25/2010, 2012, 10/03/2013  Influenza Vaccine Peg Minder) 2015  Influenza Vaccine (Quad) PF 2016  Novel Influenza-H1N1-09, Injectable 2009 "\"CGLABRATAN\", \"CPARAPSILOS\", \"CLUSITANIAE\", \"CKRUSEI\", \"TRICHVAG\", \"CHLAMNAA\", \"NGONORRHON\", \"UREAPLASMA\", \"MYCOPLASMAG\"    Lab Results   Component Value Date    TSH 0.846 08/02/2024         Radiographic Studies  US Abdomen Limited  Result Date: 12/20/2024  Palpable abnormality in the left upper quadrant soft tissues is likely a sebaceous cyst. Consider follow-up to document stability.    Images were reviewed, interpreted, and dictated by Dr. Oneyda Ramirez MD Transcribed by Mary Hines PA-C.  This report was signed and finalized on 12/20/2024 12:22 PM by Oneyda Ramirez MD.        I have reviewed the above labs and imaging.     Assessment / Plan      Diagnoses and all orders for this visit:    1. IC (interstitial cystitis) (Primary)    2. OAB (overactive bladder)         Assessment & Plan  1. Overactive bladder: Significant improvement with Gemtesa, including decreased frequency and pain, and improved sleep.  - Continue Gemtesa.  - Contact office via MyChart or phone for baclofen suppository refills.  - Continue amitriptyline nightly (5 mg or 2.5 mg).  - Continue hydroxyzine as needed or nightly.    Follow-up  - Follow-up in 6 months or sooner if necessary.       No follow-ups on file.    Chandan Alexander, MSN, APRN, FNP-C  [facility]    "  Pneumococcal Polysaccharide (PPSV-23) 2014, 2015  ZZZ-RETIRED (DO NOT USE) Pneumococcal Vaccine (Unspecified Type) 2012 Objective:  
 
Visit Vitals /78 (BP 1 Location: Left arm) Pulse 84 Temp 98.2 °F (36.8 °C) Resp 16 Ht 5' 4\" (1.626 m) Wt 86.2 kg (190 lb) SpO2 100% BMI 32.61 kg/m² Temp (24hrs), Av.2 °F (36.8 °C), Min:98.1 °F (36.7 °C), Max:98.2 °F (36.8 °C) Oxygen Therapy O2 Sat (%): 100 % (18 0800) Pulse via Oximetry: 85 beats per minute (18 0717) O2 Device: Room air (18 0800) Physical Exam: 
General:    Alert, cooperative, mild distress Head:   NCAT. No obvious deformity Nose:  Nares normal. No drainage Lungs:   Mild wheezing bilaterally Heart:   RRR. No m/r/g. Abdomen:   S/nt/nd. Bowel sounds normal.  
Extremities: No cyanosis. Skin:     No rashes or lesions. Not Jaundiced Neurologic: Moves all extremities. no gross focal deficits Data Review:  
Recent Results (from the past 24 hour(s)) CBC WITH AUTOMATED DIFF Collection Time: 18  5:20 AM  
Result Value Ref Range WBC 6.7 4.3 - 11.1 K/uL  
 RBC 3.29 (L) 4.05 - 5.2 M/uL  
 HGB 10.0 (L) 11.7 - 15.4 g/dL HCT 31.1 (L) 35.8 - 46.3 % MCV 94.5 79.6 - 97.8 FL  
 MCH 30.4 26.1 - 32.9 PG  
 MCHC 32.2 31.4 - 35.0 g/dL  
 RDW 14.6 % PLATELET 428 737 - 057 K/uL MPV 10.1 9.4 - 12.3 FL ABSOLUTE NRBC 0.00 0.0 - 0.2 K/uL  
 DF AUTOMATED NEUTROPHILS 82 (H) 43 - 78 % LYMPHOCYTES 15 13 - 44 % MONOCYTES 3 (L) 4.0 - 12.0 % EOSINOPHILS 0 (L) 0.5 - 7.8 % BASOPHILS 0 0.0 - 2.0 % IMMATURE GRANULOCYTES 0 0.0 - 5.0 %  
 ABS. NEUTROPHILS 5.5 1.7 - 8.2 K/UL  
 ABS. LYMPHOCYTES 1.0 0.5 - 4.6 K/UL  
 ABS. MONOCYTES 0.2 0.1 - 1.3 K/UL  
 ABS. EOSINOPHILS 0.0 0.0 - 0.8 K/UL  
 ABS. BASOPHILS 0.0 0.0 - 0.2 K/UL  
 ABS. IMM. GRANS. 0.0 0.0 - 0.5 K/UL METABOLIC PANEL, COMPREHENSIVE Collection Time: 18  5:20 AM  
Result Value Ref Range Sodium 142 136 - 145 mmol/L Potassium 4.0 3.5 - 5.1 mmol/L Chloride 109 (H) 98 - 107 mmol/L  
 CO2 23 21 - 32 mmol/L Anion gap 10 7 - 16 mmol/L Glucose 129 (H) 65 - 100 mg/dL BUN 7 6 - 23 MG/DL Creatinine 1.09 (H) 0.6 - 1.0 MG/DL  
 GFR est AA >60 >60 ml/min/1.73m2 GFR est non-AA 56 (L) >60 ml/min/1.73m2 Calcium 8.3 8.3 - 10.4 MG/DL Bilirubin, total 0.2 0.2 - 1.1 MG/DL  
 ALT (SGPT) 53 12 - 65 U/L  
 AST (SGOT) 92 (H) 15 - 37 U/L Alk. phosphatase 136 50 - 136 U/L Protein, total 7.0 6.3 - 8.2 g/dL Albumin 3.5 3.5 - 5.0 g/dL Globulin 3.5 2.3 - 3.5 g/dL A-G Ratio 1.0 (L) 1.2 - 3.5 ETHYL ALCOHOL Collection Time: 11/24/18  5:20 AM  
Result Value Ref Range ALCOHOL(ETHYL),SERUM <3 MG/DL  
BNP Collection Time: 11/24/18  5:38 AM  
Result Value Ref Range  pg/mL DRUG SCREEN, URINE Collection Time: 11/24/18  7:17 AM  
Result Value Ref Range PCP(PHENCYCLIDINE) NEGATIVE BENZODIAZEPINES NEGATIVE     
 COCAINE POSITIVE AMPHETAMINES NEGATIVE METHADONE NEGATIVE     
 THC (TH-CANNABINOL) NEGATIVE     
 OPIATES NEGATIVE     
 BARBITURATES NEGATIVE Imaging /Procedures /Studies: 
I personally reviewed all labs, imaging, and other studies this admission: CXR reviewed by me. EKG reviewed by me. CXR Results  (Last 48 hours)  
          
 11/24/18 0554  XR CHEST PORT Final result Impression:  IMPRESSION: Normal chest.  
   
   
  
 Narrative:  EXAM:  XR CHEST PORT INDICATION:  cough shortness of breath COMPARISON:  11/22/2018 FINDINGS: A portable AP radiograph of the chest was obtained at 0533 hours. The  
patient is on a cardiac monitor. The lungs are clear. The cardiac and  
mediastinal contours and pulmonary vascularity are normal.  The bones and soft  
tissues are grossly within normal limits. CT Results  (Last 48 hours)  
          
 11/24/18 0640  CT CHEST W CONT Final result Impression:  IMPRESSION:  
   
No evidence of pulmonary embolism. Bilateral upper lobe pneumonia. Date of Dictation: 11/24/2018 6:54 AM   
  
 Narrative:  CTA OF THE CHEST - PE STUDY HISTORY: Shortness of breath COMPARISON: 8/29/2018 TECHNIQUE: A helical acquisition was performed through the chest utilizing 8.09ZH slice thickness during the infusion of 100 cc of Isovue-370. 3-D post-processed images were created on an independent workstation. Multiplanar  
reformats were obtained. The exam was focused on the pulmonary arteries. Dose reduction techniques used: Automated exposure control, adjustment of the  
mAs and/or kVp according to patient's size, and iterative reconstruction  
techniques. FINDINGS:  
*  PULMONARY VESSELS: No evidence of pulmonary embolism. *  PLEURA / PERICARDIUM: Within normal limits. *  SAIGE / MEDIASTINUM: Within normal limits. *  LUNGS: Patchy bilateral airspace disease with an upper zone predominance. *  TRACHEOBRONCHIAL TREE: Within normal limits. *  AORTA: Within normal limits. *  CORONARY ARTERIES: No coronary artery calcification is seen. *  CHEST WALL/AXILLA: Within normal limits. *  VISUALIZED UPPER ABDOMEN: Within normal limits. *  SPINE / BONES: Within normal limits. *  ADDITIONAL COMMENTS: None. Assessment and Plan:  
 
Patient Active Problem List  
Diagnosis Code  Esophageal reflux K21.9  Rheumatic disease of mitral valve I05.9  Degenerative disc disease, lumbar M51.36  
 Interstitial cystitis N30.10  Cocaine abuse ([address] F14.10  Rheumatic aortic insufficiency I06.1  Palpitations R00.2  Bronchitis, chronic, mucopurulent (HCC) J41.1  Takotsubo cardiomyopathy I51.81  
 Carotidynia G90.01  
 Nicotine dependence F17.200  Hx of cocaine abuse Z87.898  Chronic respiratory failure (HCC) J96.10  Depression, recurrent ([address] F33.9  DVT, recurrent, lower extremity, chronic, bilateral (HCC) I82.503  Anemia D64.9  
 COPD (chronic obstructive pulmonary disease) (HCC) J44.9  Chronic respiratory failure with hypoxia (HCC) J96.11  
 Hyperlipidemia E78.5  Diastolic dysfunction E70.2  Fall W19. [facility]istaers  Left shoulder pain M25.512  Abnormal nipple N64.9  Anxiety F41.9  Aortic valve regurgitation I35.1  Erwin's esophagus without dysplasia K22.70  Breast lesion N64.9  Breast pain, left N64.4  Chronic low back pain M54.5, G89.29  
 Cocaine-induced vascular disorder (HCC) F14.988, I99.9  Colon polyps K63.5  DNR (do not resuscitate) [id number]  Esophageal dysphagia R13.10  Healthcare maintenance Z00.00  
 History of DVT (deep vein thrombosis) Z86.718  
 History of tobacco use, presenting hazards to health Z87.891  
 HTN (hypertension) I10  
 Increased risk of breast cancer Z91.89  
 Intraductal papilloma of left breast D24.2  Irritable bowel syndrome without diarrhea K58.9  Lung nodule R91.1  Migraine with aura and without status migrainosus, not intractable G43. [facility]  Mild intermittent asthma with (acute) exacerbation J45.21  
 Mitral valve regurgitation I34.0  NSTEMI (non-ST elevated myocardial infarction) ([address] I21.4  Nasal polyps J33.9  
 Oral phase dysphagia R13.11  
 Post-operative state Z98.890  Reflux esophagitis K21.0  Right leg pain M79.604  Status post hysterectomy Z90.710  Surgical menopause E89.40  Swelling of both lower extremities M79.89  Vaginal discharge N89.8  Vasomotor symptoms due to menopause N95.1  Visit for screening mammogram Z12.31  
 Chronic systolic congestive heart failure (HCC) I50.22  Bilateral pneumonia J18.9 PLAN 
 
-Possible bilateral pneumonia 
-ct scan reported bilateral upper pneumonia 
-negative for Flu  
-clinically her presentation is not very impressive and the main relevant finding is her persistent cough -I suspect this might be a pneumonitis due to persistent cocaine use -nebs with albuterol, pulmicort, and continue her regular spiriva  
-using supplementary O2 at home  
-IV ceftriaxone and oral doxycycline for now . Follow cultures # systolic CHF  
-she is using coreg and spironolactone at home  
-Apparently she is on Adalat at home, however this is not mentioned in the cardiology notes and normally this drug would not be favored to be used in the setting of CHF  
-Not on ACEI or ARB II  
-I have reduced his coreg to 12.5 mg bid and have started losartan 25m po daily.  
-Not sure how appropriate is to continue using coreg in the setting of active cocaine use. Will ask cardiology to see her for proper med rec. Hydralazine + isosorbide might be a good choice for her but doubt her compliance # History of Afib.  
-she is on NSR today  
-on eliquis  
-on coreg #aortic regurgitation  
-stable #COPD 
-continue nebs  
-continue supplementary O2 #anxiety  
-on klonopin prn FEN:  Cardiac diet DVT ppx: On eliquis Code status:  Full Estimated LOS:  > 2MN Risk assessment:  high Plan of care discussed with: patient Signed By: Maxx Pickard MD   
 November 24, 2018

## 2025-05-13 ENCOUNTER — TELEPHONE (OUTPATIENT)
Dept: GASTROENTEROLOGY | Facility: CLINIC | Age: 35
End: 2025-05-13

## 2025-05-13 NOTE — TELEPHONE ENCOUNTER
Name: Georgie Winston Annel    Relationship: Self    Best Callback Number: [phone]    Patient would like to Schedule a Follow-Up. Unable to schedule within the 3 week timeframe.     Patient is having symptoms of severe diarrhea and abdominal pain daily for past 2-3 years. Please call patient. If not able to reach pt. It is okay to lvm.

## 2025-05-14 ENCOUNTER — HOSPITAL ENCOUNTER (OUTPATIENT)
Dept: GENERAL RADIOLOGY | Facility: HOSPITAL | Age: 35
Discharge: HOME | End: 2025-05-14
Payer: MEDICAID

## 2025-05-14 ENCOUNTER — OFFICE VISIT (OUTPATIENT)
Dept: GASTROENTEROLOGY | Facility: CLINIC | Age: 35
End: 2025-05-14
Payer: MEDICAID

## 2025-05-14 VITALS
WEIGHT: 145.2 LBS | HEART RATE: 67 BPM | HEIGHT: 62 IN | SYSTOLIC BLOOD PRESSURE: 132 MMHG | BODY MASS INDEX: 26.72 KG/M2 | DIASTOLIC BLOOD PRESSURE: 81 MMHG | TEMPERATURE: 97.1 F

## 2025-05-14 DIAGNOSIS — K58.0 IRRITABLE BOWEL SYNDROME WITH DIARRHEA: ICD-10-CM

## 2025-05-14 DIAGNOSIS — K58.0 IRRITABLE BOWEL SYNDROME WITH DIARRHEA: Primary | ICD-10-CM

## 2025-05-14 PROCEDURE — 99214 OFFICE O/P EST MOD 30 MIN: CPT

## 2025-05-14 PROCEDURE — 74018 RADEX ABDOMEN 1 VIEW: CPT

## 2025-05-14 PROCEDURE — 1159F MED LIST DOCD IN RCRD: CPT

## 2025-05-14 PROCEDURE — 1160F RVW MEDS BY RX/DR IN RCRD: CPT

## 2025-05-14 NOTE — PROGRESS NOTES
Office Note     Name: Georgie Winston    : 1990     MRN: [id number]     Chief Complaint  Diarrhea, Abdominal Pain, Bloated, and Gastric Cancer    Subjective     History of Present Illness:  History of Present Illness  The patient is a 35-year-old female who presents today for further evaluation and management of chronic diarrhea and abdominal pain that has been present for the past 2 to 3 years. She had an unremarkable gallbladder ultrasound last year and an unremarkable abdominal ultrasound in 2024 aside from a small sebaceous cyst, which is improving. She is accompanied by her fiancé.    She reports persistent loose stools, bloating, and abdominal discomfort, which have progressively worsened. Her bowel movements are predominantly liquid, with occasional small, hard stools resembling rabbit droppings. She experiences severe stomach cramps that induce nausea, describing the sensation as akin to having the stomach flu daily. She does not experience periods of complete bowel evacuation or solid stool formation. She also reports constant bloating, necessitating daily gas medication. Currently, she is experiencing severe cramping, which she likens to a combination of menstrual and flu symptoms. She reports no rectal bleeding but admits to frequent hemorrhoids, managed with witch hazel wipes and over-the-counter medications.     She has been prescribed baclofen for rectal spasms during intercourse, but it has not provided significant relief. She has tried Bentyl without much benefit as well. She has been taking gas medication daily. She underwent physical therapy for her abdomen following her last visit, which did not result in improved bowel movements. She has identified certain foods that exacerbate her bladder condition, which has helped her differentiate between bladder and stomach pain. Her symptoms began after a laparoscopy, for further evaluation of bladder issues. However, her symptoms have  significantly worsened since the procedure. She experiences up to 10 bowel movements per day, often immediately after eating, primarily in the mornings but increasingly in the evenings over the past few weeks, sometimes waking her form sleep. She has not consulted a gastroenterologist in the past 8 years.    She has a small sebaceous cyst.    PAST SURGICAL HISTORY:  Laparoscopy  Two surgeries before the age of 12    She had acid reflux in the past, but it has resolved now.    SOCIAL HISTORY  Marital Status: Engaged    FAMILY HISTORY  - Negative for colon cancer in parents and siblings.    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    Ovarian cyst     Panic attack 2013    Parsonage-Lozano syndrome     left shoulder    Rash and other nonspecific skin eruption     Thoracic nerve injury     left    Urinary tract infection        Past Surgical History:   Past Surgical History:   Procedure Laterality Date    ADENOIDECTOMY  1994    CERVICAL CONE BIOPSY  2023     SECTION N/A 2017    Procedure:  SECTION PRIMARY;  Surgeon: Antione Vazquez MD;  [facility]: [facility];  Service:      SECTION  2019    CYSTOSCOPY  2023    ENDOSCOPY AND COLONOSCOPY  2016    PELVIC LAPAROSCOPY  2023    TONSILLECTOMY Bilateral     UMBILICAL HERNIA REPAIR      WISDOM TOOTH EXTRACTION         Immunizations:   Immunization History   Administered Date(s) Administered    Fluzone (or Fluarix &  "Flulaval for VFC) >6mos 10/22/2019, 11/16/2020, 11/16/2021    Hepatitis B Adult/Adolescent IM 10/23/2015    Hpv9 10/23/2015    Influenza Injectable Mdck Pf Quad 11/03/2022    Influenza, Unspecified 11/16/2020, 11/16/2021    Tdap 01/01/2019, 10/22/2019        Medications:     Current Outpatient Medications:     amitriptyline (ELAVIL) 10 MG tablet, Take 0.5 tablets by mouth Every Night., Disp: 15 tablet, Rfl: 11    dicyclomine (BENTYL) 10 MG capsule, Take 1 capsule by mouth 3 (Three) Times a Day As Needed for Abdominal Cramping., Disp: 90 capsule, Rfl: 1    hydrOXYzine (ATARAX) 25 MG tablet, Take 1 tablet by mouth Every Night., Disp: 90 tablet, Rfl: 3    Loratadine (CLARITIN PO), Take  by mouth., Disp: , Rfl:     norethindrone-ethinyl estradiol FE (Junel FE 1/20) 1-20 MG-MCG per tablet, Take 1 tablet by mouth Daily., Disp: 28 tablet, Rfl: 12    ondansetron ODT (ZOFRAN-ODT) 4 MG disintegrating tablet, DISSOLVE 1 TABLET ON TONGUE FOUR TIMES DAILY AS NEEDED FOR NAUSEA AND VOMITING, Disp: , Rfl:     phenazopyridine (Pyridium) 200 MG tablet, Take 1 tablet by mouth 3 (Three) Times a Day As Needed for Bladder Spasms., Disp: 20 tablet, Rfl: 0    valACYclovir (VALTREX) 1000 MG tablet, TAKE 1 TABLET BY MOUTH DAILY, Disp: 30 tablet, Rfl: 0    Vibegron (Gemtesa) 75 MG tablet, Take 1 tablet by mouth Daily., Disp: 30 tablet, Rfl: 11    riFAXIMin (Xifaxan) 550 MG tablet, Take 1 tablet by mouth Every 8 (Eight) Hours., Disp: 42 tablet, Rfl: 0    Allergies:   Allergies   Allergen Reactions    Contrast Dye (Echo Or Unknown Ct/Mr) Anaphylaxis     Premedication for CT scan with prednisone, Benadryl, and Tagamet did not work for patient, she still had hives and swelling after contrast.    Shellfish Allergy Anaphylaxis    Shellfish-Derived Products Anaphylaxis    Amoxicillin Other (See Comments)     Yeast inf    Ciprofloxacin Hcl Mental Status Change     Changes personality \"altered state of mind; I become violent & can't control my " "actions.\"    Erythromycin Diarrhea and Nausea And Vomiting    Gabapentin GI Intolerance    Zithromax [Azithromycin] Nausea And Vomiting     NVD    Demerol [Meperidine] Hives     IV after surgery    Flagyl [Metronidazole] Rash       Family History:   Family History   Problem Relation Age of Onset    Hypertension Father     Diabetes Father     Heart attack Father     Osteoporosis Mother     Hypertension Mother     Diabetes Mother     Colon polyps Mother     Skin cancer Mother     Osteoporosis Maternal Aunt     Alcohol abuse Maternal Uncle     Liver disease Maternal Uncle     Colon cancer Neg Hx     Liver cancer Neg Hx     Stomach cancer Neg Hx     Esophageal cancer Neg Hx     Breast cancer Neg Hx     Ovarian cancer Neg Hx     Pancreatic cancer Neg Hx     Prostate cancer Neg Hx        Social History:   Social History     Socioeconomic History    Marital status:    Tobacco Use    Smoking status: Former     Current packs/day: 0.00     Average packs/day: 0.5 packs/day for 3.0 years (1.5 ttl pk-yrs)     Types: Cigarettes     Start date: 2013     Quit date: 2016     Years since quittin.4     Passive exposure: Never    Smokeless tobacco: Never    Tobacco comments:     quit 2016   Vaping Use    Vaping status: Former    Substances: Nicotine, Flavoring    Devices: Pre-filled or refillable cartridge   Substance and Sexual Activity    Alcohol use: Not Currently    Drug use: No    Sexual activity: Yes     Partners: Male     Birth control/protection: OCP         Objective     Vital Signs  /81 (BP Location: Right arm, Patient Position: Sitting, Cuff Size: Adult)   Pulse 67   Temp 97.1 °F (36.2 °C) (Temporal)   Ht 157.5 cm (62\")   Wt 65.9 kg (145 lb 3.2 oz)   BMI 26.56 kg/m²   Estimated body mass index is 26.56 kg/m² as calculated from the following:    Height as of this encounter: 157.5 cm (62\").    Weight as of this encounter: 65.9 kg (145 lb 3.2 oz).            Physical Exam  Vitals reviewed. "   Constitutional:       General: She is awake.   Cardiovascular:      Rate and Rhythm: Normal rate.   Pulmonary:      Effort: Pulmonary effort is normal.   Abdominal:      Palpations: Abdomen is soft.      Tenderness: There is abdominal tenderness in the right upper quadrant and left upper quadrant.   Skin:     General: Skin is warm and dry.   Neurological:      Mental Status: She is alert.        Physical Exam  General: Patient appears in no acute distress.  Abdomen: Mild tenderness noted in the upper abdomen. No significant tenderness in the lower abdomen.  Skin: Sebaceous cyst noted in the upper abdomen, appears to have decreased in size.    Results  Labs   - C. difficile: Negative   - Ova and parasites: Negative   - GI panel: Negative   - Fecal calprotectin: Normal   - Fecal elastase: Normal    Imaging   - Gallbladder ultrasound: 2024, Unremarkable   - Abdominal ultrasound: 11/2024, Unremarkable aside from a small sebaceous cyst      Assessment and Plan     Assessment & Plan  Irritable bowel syndrome with diarrhea    Orders:    XR Abdomen KUB; Future    riFAXIMin (Xifaxan) 550 MG tablet; Take 1 tablet by mouth Every 8 (Eight) Hours.    Celiac Comprehensive Panel       Assessment & Plan  1. Chronic diarrhea:  - Order abdominal x-ray to assess for potential fecal impaction, as she does have intermittent pellet-like stools and stool studies have been normal.   - Prescribe Xifaxan 550 mg, to be taken three times daily for 2 weeks; medication sent to pharmacy to address any bacterial overgrowth that may be contributing.  - If x-ray reveals significant stool accumulation, will plan to use a bowel prep to clean out, followed by daily use of Miralax, titrating to effectiveness.   - If x-ray does not show significant stool accumulation, consider EGD and colonoscopy, given severity of symptoms.     2. Abdominal pain:  - Discontinue Bentyl as it may worsen symptoms if fecal impaction is present, and it has not provided  benefit.  - Order celiac panel to rule out celiac disease. This was last checked 8 years ago, prior to development of these symptoms.    3. Hemorrhoids:  - Recommend over-the-counter suppositories containing phenylephrine and lidocaine for symptom relief.    4. Sebaceous cyst:  - Consider follow-up with a dermatologist if the cyst becomes painful or does not resolve.      Follow Up  3-6 months    Patient or patient representative verbalized consent for the use of Ambient Listening during the visit with  ROD Drake for chart documentation. 5/14/2025  11:56 EDT    ROD Drake  [facility]  [facility] GASTRO[facility]OLOG[facility]  [address] [place] [id number]

## 2025-05-14 NOTE — ASSESSMENT & PLAN NOTE
Orders:    XR Abdomen KUB; Future    riFAXIMin (Xifaxan) 550 MG tablet; Take 1 tablet by mouth Every 8 (Eight) Hours.    Celiac Comprehensive Panel

## 2025-06-27 ENCOUNTER — TELEPHONE (OUTPATIENT)
Dept: GASTROENTEROLOGY | Facility: CLINIC | Age: 35
End: 2025-06-27
Payer: MEDICAID

## 2025-06-27 NOTE — TELEPHONE ENCOUNTER
I called patient and scheduled her an earlier appointment with Ladan VELASCO for ongoing issues with diarrhea.

## 2025-07-01 ENCOUNTER — OFFICE VISIT (OUTPATIENT)
Dept: GASTROENTEROLOGY | Facility: CLINIC | Age: 35
End: 2025-07-01
Payer: MEDICAID

## 2025-07-01 VITALS
HEIGHT: 62 IN | BODY MASS INDEX: 26.2 KG/M2 | WEIGHT: 142.4 LBS | DIASTOLIC BLOOD PRESSURE: 82 MMHG | TEMPERATURE: 98.2 F | HEART RATE: 75 BPM | SYSTOLIC BLOOD PRESSURE: 127 MMHG

## 2025-07-01 DIAGNOSIS — R19.7 DIARRHEA, UNSPECIFIED TYPE: ICD-10-CM

## 2025-07-01 DIAGNOSIS — R10.11 RIGHT UPPER QUADRANT ABDOMINAL PAIN: Primary | ICD-10-CM

## 2025-07-01 PROCEDURE — 99214 OFFICE O/P EST MOD 30 MIN: CPT

## 2025-07-01 PROCEDURE — 1160F RVW MEDS BY RX/DR IN RCRD: CPT

## 2025-07-01 PROCEDURE — 1159F MED LIST DOCD IN RCRD: CPT

## 2025-07-01 RX ORDER — ONDANSETRON 4 MG/1
4 TABLET, ORALLY DISINTEGRATING ORAL EVERY 8 HOURS PRN
Qty: 45 TABLET | Refills: 1 | Status: SHIPPED | OUTPATIENT
Start: 2025-07-01

## 2025-07-01 RX ORDER — COLESTIPOL HYDROCHLORIDE 1 G/1
1 TABLET ORAL 2 TIMES DAILY
Qty: 60 TABLET | Refills: 11 | Status: SHIPPED | OUTPATIENT
Start: 2025-07-01

## 2025-07-01 NOTE — PROGRESS NOTES
Office Note     Name: Georgie Winston    : 1990     MRN: [id number]     Chief Complaint  Diarrhea    Subjective     History of Present Illness:  History of Present Illness  The patient is a 35-year-old female who presents today for follow-up of chronic abdominal pain and diarrhea. She has had an extensive nondiagnostic workup for this, including unremarkable stool studies and imaging with gallbladder ultrasound and KUB showing only mild stool burden.    She reports that Xifaxan was ineffective. She was previously on amitriptyline for bladder issues but discontinued it due to running out of the medication. Interestingly, she felt better off the medication, although her diarrhea worsened. She experiences severe nausea, improved with zofran. Her symptoms began after a  with her daughter. Despite emergency room visits and consultations with multiple doctors, no cause has been identified. Pelvic floor therapy did not alleviate her symptoms. She experiences up to 5 or 6 episodes of watery diarrhea daily, which leaves her feeling faint.     Her abdominal pain is located under her breastbone, sometimes around her back, and worsens after multiple episodes of diarrhea, with cramping. She feels better after eating, but the cycle of symptoms resumes each morning. If she does not have diarrhea, she will vomit. She has lost weight since her last visit, unintentionally. She has noticed significant hair loss over the past few years as well. She tested negative for autoimmune conditions 8 months ago. She is not on a gluten-free diet. She does not consume alcohol due to her bladder condition and drinks a lot of water. Her symptoms are most severe in the morning, causing her to struggle with daily tasks such as preparing her children for school. She has been prescribed prednisone for allergies, which she takes sporadically and has recently identified that it also improves her diarrhea and abdominal pain.      She has acid reflux and has significantly restricted her diet due to its severity. She does not experience difficulty swallowing or rectal bleeding.    PAST SURGICAL HISTORY:    Cervical procedure    SOCIAL HISTORY  Diet: The patient reports restricting her diet significantly due to acid reflux.  Alcohol: The patient does not drink alcohol.    Past Medical History:   Past Medical History:   Diagnosis Date    Abnormal Pap smear of cervix age 18    HPV, normal Paps since    Anxiety and depression     Asthma     Back pain 2013    Depression     Endometriosis     Fatigue     Fibroid     Fibromyalgia 2013    Genital herpes during pregnancy     last outbreak was @ conception    GERD (gastroesophageal reflux disease)     H/O colonoscopy with polypectomy 2016    Head ache     Herpes         HPV (human papilloma virus) infection     Injury of long thoracic nerve     left shoulder    Ovarian cyst     Panic attack 2013    Parsonage-Lozano syndrome     left shoulder    Rash and other nonspecific skin eruption     Thoracic nerve injury     left    Urinary tract infection        Past Surgical History:   Past Surgical History:   Procedure Laterality Date    ADENOIDECTOMY      CERVICAL CONE BIOPSY  2023     SECTION N/A 2017    Procedure:  SECTION PRIMARY;  Surgeon: Antione Vazquez MD;  Location: [facility] LABOR DELIVERY;  Service:      SECTION      CYSTOSCOPY  2023    ENDOSCOPY AND COLONOSCOPY  2016    PELVIC LAPAROSCOPY  2023    TONSILLECTOMY Bilateral     UMBILICAL HERNIA REPAIR      WISDOM TOOTH EXTRACTION         Immunizations:   Immunization History   Administered Date(s) Administered    Fluzone (or Fluarix & Flulaval for VFC) >6mos 10/22/2019, 2020, 2021    Hepatitis B Adult/Adolescent IM 10/23/2015    Hpv9 10/23/2015    Influenza Injectable Mdck Pf Quad 2022    Influenza, Unspecified 2020, 2021    Tdap  "01/01/2019, 10/22/2019        Medications:     Current Outpatient Medications:     hydrOXYzine (ATARAX) 25 MG tablet, Take 1 tablet by mouth Every Night., Disp: 90 tablet, Rfl: 3    Loratadine (CLARITIN PO), Take  by mouth., Disp: , Rfl:     norethindrone-ethinyl estradiol FE (Junel FE 1/20) 1-20 MG-MCG per tablet, Take 1 tablet by mouth Daily., Disp: 28 tablet, Rfl: 12    ondansetron ODT (ZOFRAN-ODT) 4 MG disintegrating tablet, DISSOLVE 1 TABLET ON TONGUE FOUR TIMES DAILY AS NEEDED FOR NAUSEA AND VOMITING, Disp: , Rfl:     phenazopyridine (Pyridium) 200 MG tablet, Take 1 tablet by mouth 3 (Three) Times a Day As Needed for Bladder Spasms., Disp: 20 tablet, Rfl: 0    valACYclovir (VALTREX) 1000 MG tablet, TAKE 1 TABLET BY MOUTH DAILY, Disp: 30 tablet, Rfl: 0    Vibegron (Gemtesa) 75 MG tablet, Take 1 tablet by mouth Daily., Disp: 30 tablet, Rfl: 11    amitriptyline (ELAVIL) 10 MG tablet, Take 0.5 tablets by mouth Every Night. (Patient not taking: Reported on 7/1/2025), Disp: 15 tablet, Rfl: 11    dicyclomine (BENTYL) 10 MG capsule, Take 1 capsule by mouth 3 (Three) Times a Day As Needed for Abdominal Cramping. (Patient not taking: Reported on 7/1/2025), Disp: 90 capsule, Rfl: 1    riFAXIMin (Xifaxan) 550 MG tablet, Take 1 tablet by mouth Every 8 (Eight) Hours., Disp: 42 tablet, Rfl: 0    Allergies:   Allergies   Allergen Reactions    Contrast Dye (Echo Or Unknown Ct/Mr) Anaphylaxis     Premedication for CT scan with prednisone, Benadryl, and Tagamet did not work for patient, she still had hives and swelling after contrast.    Shellfish Allergy Anaphylaxis    Shellfish-Derived Products Anaphylaxis    Amoxicillin Other (See Comments)     Yeast inf    Ciprofloxacin Hcl Mental Status Change     Changes personality \"altered state of mind; I become violent & can't control my actions.\"    Erythromycin Diarrhea and Nausea And Vomiting    Gabapentin GI Intolerance    Zithromax [Azithromycin] Nausea And Vomiting     NVD    " "Demerol [Meperidine] Hives     IV after surgery    Flagyl [Metronidazole] Rash       Family History:   Family History   Problem Relation Age of Onset    Hypertension Father     Diabetes Father     Heart attack Father     Osteoporosis Mother     Hypertension Mother     Diabetes Mother     Colon polyps Mother     Skin cancer Mother     Osteoporosis Maternal Aunt     Alcohol abuse Maternal Uncle     Liver disease Maternal Uncle     Colon cancer Neg Hx     Liver cancer Neg Hx     Stomach cancer Neg Hx     Esophageal cancer Neg Hx     Breast cancer Neg Hx     Ovarian cancer Neg Hx     Pancreatic cancer Neg Hx     Prostate cancer Neg Hx        Social History:   Social History     Socioeconomic History    Marital status:    Tobacco Use    Smoking status: Former     Current packs/day: 0.00     Average packs/day: 0.5 packs/day for 3.0 years (1.5 ttl pk-yrs)     Types: Cigarettes     Start date: 2013     Quit date: 2016     Years since quittin.5     Passive exposure: Never    Smokeless tobacco: Never    Tobacco comments:     quit 2016   Vaping Use    Vaping status: Former    Substances: Nicotine, Flavoring    Devices: Pre-filled or refillable cartridge   Substance and Sexual Activity    Alcohol use: Not Currently    Drug use: No    Sexual activity: Yes     Partners: Male     Birth control/protection: OCP         Objective     Vital Signs  /82 (BP Location: Right arm, Patient Position: Sitting, Cuff Size: Adult)   Pulse 75   Temp 98.2 °F (36.8 °C) (Temporal)   Ht 157.5 cm (62\")   Wt 64.6 kg (142 lb 6.4 oz)   BMI 26.05 kg/m²   Estimated body mass index is 26.05 kg/m² as calculated from the following:    Height as of this encounter: 157.5 cm (62\").    Weight as of this encounter: 64.6 kg (142 lb 6.4 oz).            Physical Exam  Vitals reviewed.   Constitutional:       General: She is awake.   Cardiovascular:      Rate and Rhythm: Normal rate.   Pulmonary:      Effort: Pulmonary effort is " normal.   Abdominal:      Palpations: Abdomen is soft.      Tenderness: There is abdominal tenderness in the epigastric area.   Skin:     General: Skin is warm and dry.   Neurological:      Mental Status: She is alert.        Physical Exam  Abdomen: Tenderness noted in the right upper quadrant and epigastric region. No tenderness in the lower abdomen.    Results  Labs   - Stool studies: Unremarkable   - GI panel: Negative, no infectious stuff, no elevated inflammatory markers   - Pancreas test: Fine   - Ova and parasites: Good    Imaging   - Gallbladder ultrasound: Unremarkable   - KUB: Only mild stool burden      Assessment and Plan     Assessment & Plan  Right upper quadrant abdominal pain    Orders:    ondansetron ODT (ZOFRAN-ODT) 4 MG disintegrating tablet; Place 1 tablet on the tongue Every 8 (Eight) Hours As Needed for Nausea or Vomiting.    Diarrhea, unspecified type    Orders:    colestipol (COLESTID) 1 g tablet; Take 1 tablet by mouth 2 (Two) Times a Day.       Assessment & Plan  1. Chronic abdominal pain:  - Perform upper endoscopy and colonoscopy to investigate the cause.  - Improvement with steroids is concerning for inflammatory process. Prior fecal calprotectin was negative, which may not accurately reflect inflammation in the small bowel.   - Avoid taking steroids for a couple of weeks before the procedures if possible.  - Prescribe colestipol, 1 tablet twice daily.  - Take other medications at least 1 hour before or 4 hours after colestipol, particularly birth control.    2. Diarrhea:  - Discuss potential benefits of colestipol in adding more substance to the stool.  - Prescribe Zofran to manage nausea.    3. Nausea:  - Refill Zofran.        Follow Up  After EGD and colonoscopy    Patient or patient representative verbalized consent for the use of Ambient Listening during the visit with  ROD Drake for chart documentation. 7/1/2025  13:28 EDT    ROD Drake  [facility]  [id number]  [facility] GASTROENTEROLOG[facility]  [address] [id number]

## 2025-07-08 RX ORDER — NORETHINDRONE ACETATE AND ETHINYL ESTRADIOL 1MG-20(21)
1 KIT ORAL DAILY
Qty: 28 TABLET | Refills: 0 | Status: SHIPPED | OUTPATIENT
Start: 2025-07-08

## 2025-07-16 ENCOUNTER — HOSPITAL ENCOUNTER (EMERGENCY)
Facility: HOSPITAL | Age: 35
Discharge: HOME | End: 2025-07-16
Attending: STUDENT IN AN ORGANIZED HEALTH CARE EDUCATION/TRAINING PROGRAM | Admitting: STUDENT IN AN ORGANIZED HEALTH CARE EDUCATION/TRAINING PROGRAM
Payer: MEDICAID

## 2025-07-16 ENCOUNTER — APPOINTMENT (OUTPATIENT)
Facility: HOSPITAL | Age: 35
End: 2025-07-16
Payer: MEDICAID

## 2025-07-16 VITALS
HEART RATE: 74 BPM | OXYGEN SATURATION: 93 % | BODY MASS INDEX: 26.13 KG/M2 | WEIGHT: 142 LBS | RESPIRATION RATE: 20 BRPM | SYSTOLIC BLOOD PRESSURE: 114 MMHG | TEMPERATURE: 98.2 F | HEIGHT: 62 IN | DIASTOLIC BLOOD PRESSURE: 87 MMHG

## 2025-07-16 DIAGNOSIS — K52.9 CHRONIC DIARRHEA: ICD-10-CM

## 2025-07-16 DIAGNOSIS — R10.84 GENERALIZED ABDOMINAL PAIN: Primary | ICD-10-CM

## 2025-07-16 DIAGNOSIS — R42 DIZZINESS: ICD-10-CM

## 2025-07-16 LAB
ALBUMIN SERPL-MCNC: 4 G/DL (ref 3.5–5.2)
ALBUMIN/GLOB SERPL: 1.6 G/DL
ALP SERPL-CCNC: 35 U/L (ref 39–117)
ALT SERPL W P-5'-P-CCNC: 17 U/L (ref 1–33)
ANION GAP SERPL CALCULATED.3IONS-SCNC: 12.4 MMOL/L (ref 5–15)
AST SERPL-CCNC: 21 U/L (ref 1–32)
BASOPHILS # BLD AUTO: 0.02 10*3/MM3 (ref 0–0.2)
BASOPHILS NFR BLD AUTO: 0.4 % (ref 0–1.5)
BILIRUB SERPL-MCNC: 0.3 MG/DL (ref 0–1.2)
BILIRUB UR QL STRIP: NEGATIVE
BUN SERPL-MCNC: 8.7 MG/DL (ref 6–20)
BUN/CREAT SERPL: 9.8 (ref 7–25)
CALCIUM SPEC-SCNC: 8.8 MG/DL (ref 8.6–10.5)
CHLORIDE SERPL-SCNC: 106 MMOL/L (ref 98–107)
CLARITY UR: CLEAR
CO2 SERPL-SCNC: 21.6 MMOL/L (ref 22–29)
COLOR UR: YELLOW
CREAT SERPL-MCNC: 0.89 MG/DL (ref 0.57–1)
DEPRECATED RDW RBC AUTO: 43.8 FL (ref 37–54)
EGFRCR SERPLBLD CKD-EPI 2021: 86.8 ML/MIN/1.73
EOSINOPHIL # BLD AUTO: 0.01 10*3/MM3 (ref 0–0.4)
EOSINOPHIL NFR BLD AUTO: 0.2 % (ref 0.3–6.2)
ERYTHROCYTE [DISTWIDTH] IN BLOOD BY AUTOMATED COUNT: 12.6 % (ref 12.3–15.4)
GEN 5 1HR TROPONIN T REFLEX: <6 NG/L
GLOBULIN UR ELPH-MCNC: 2.5 GM/DL
GLUCOSE SERPL-MCNC: 85 MG/DL (ref 65–99)
GLUCOSE UR STRIP-MCNC: NEGATIVE MG/DL
HCG INTACT+B SERPL-ACNC: <0.2 MIU/ML
HCT VFR BLD AUTO: 40.2 % (ref 34–46.6)
HGB BLD-MCNC: 13.4 G/DL (ref 12–15.9)
HGB UR QL STRIP.AUTO: NEGATIVE
IMM GRANULOCYTES # BLD AUTO: 0.01 10*3/MM3 (ref 0–0.05)
IMM GRANULOCYTES NFR BLD AUTO: 0.2 % (ref 0–0.5)
KETONES UR QL STRIP: ABNORMAL
LEUKOCYTE ESTERASE UR QL STRIP.AUTO: NEGATIVE
LIPASE SERPL-CCNC: 34 U/L (ref 13–60)
LYMPHOCYTES # BLD AUTO: 1.97 10*3/MM3 (ref 0.7–3.1)
LYMPHOCYTES NFR BLD AUTO: 41.4 % (ref 19.6–45.3)
MCH RBC QN AUTO: 31.3 PG (ref 26.6–33)
MCHC RBC AUTO-ENTMCNC: 33.3 G/DL (ref 31.5–35.7)
MCV RBC AUTO: 93.9 FL (ref 79–97)
MONOCYTES # BLD AUTO: 0.56 10*3/MM3 (ref 0.1–0.9)
MONOCYTES NFR BLD AUTO: 11.8 % (ref 5–12)
NEUTROPHILS NFR BLD AUTO: 2.19 10*3/MM3 (ref 1.7–7)
NEUTROPHILS NFR BLD AUTO: 46 % (ref 42.7–76)
NITRITE UR QL STRIP: NEGATIVE
PH UR STRIP.AUTO: 6.5 [PH] (ref 5–8)
PLATELET # BLD AUTO: 222 10*3/MM3 (ref 140–450)
PMV BLD AUTO: 9.8 FL (ref 6–12)
POTASSIUM SERPL-SCNC: 3.8 MMOL/L (ref 3.5–5.2)
PROT SERPL-MCNC: 6.5 G/DL (ref 6–8.5)
PROT UR QL STRIP: NEGATIVE
QT INTERVAL: 376 MS
QTC INTERVAL: 408 MS
RBC # BLD AUTO: 4.28 10*6/MM3 (ref 3.77–5.28)
SODIUM SERPL-SCNC: 140 MMOL/L (ref 136–145)
SP GR UR STRIP: 1.01 (ref 1–1.03)
TROPONIN T NUMERIC DELTA: NORMAL
TROPONIN T SERPL HS-MCNC: <6 NG/L
UROBILINOGEN UR QL STRIP: ABNORMAL
WBC NRBC COR # BLD AUTO: 4.76 10*3/MM3 (ref 3.4–10.8)

## 2025-07-16 PROCEDURE — 80053 COMPREHEN METABOLIC PANEL: CPT | Performed by: PHYSICIAN ASSISTANT

## 2025-07-16 PROCEDURE — 84484 ASSAY OF TROPONIN QUANT: CPT | Performed by: PHYSICIAN ASSISTANT

## 2025-07-16 PROCEDURE — 99284 EMERGENCY DEPT VISIT MOD MDM: CPT | Performed by: STUDENT IN AN ORGANIZED HEALTH CARE EDUCATION/TRAINING PROGRAM

## 2025-07-16 PROCEDURE — 81003 URINALYSIS AUTO W/O SCOPE: CPT | Performed by: PHYSICIAN ASSISTANT

## 2025-07-16 PROCEDURE — 71045 X-RAY EXAM CHEST 1 VIEW: CPT

## 2025-07-16 PROCEDURE — 93005 ELECTROCARDIOGRAM TRACING: CPT | Performed by: PHYSICIAN ASSISTANT

## 2025-07-16 PROCEDURE — 84702 CHORIONIC GONADOTROPIN TEST: CPT | Performed by: PHYSICIAN ASSISTANT

## 2025-07-16 PROCEDURE — 74176 CT ABD & PELVIS W/O CONTRAST: CPT

## 2025-07-16 PROCEDURE — 36415 COLL VENOUS BLD VENIPUNCTURE: CPT

## 2025-07-16 PROCEDURE — 85025 COMPLETE CBC W/AUTO DIFF WBC: CPT | Performed by: PHYSICIAN ASSISTANT

## 2025-07-16 PROCEDURE — 70450 CT HEAD/BRAIN W/O DYE: CPT

## 2025-07-16 PROCEDURE — 83690 ASSAY OF LIPASE: CPT | Performed by: PHYSICIAN ASSISTANT

## 2025-07-16 RX ORDER — SODIUM CHLORIDE 0.9 % (FLUSH) 0.9 %
10 SYRINGE (ML) INJECTION AS NEEDED
Status: DISCONTINUED | OUTPATIENT
Start: 2025-07-16 | End: 2025-07-16 | Stop reason: HOSPADM

## 2025-07-16 NOTE — FSED PROVIDER NOTE
[place]    EMERGENCY DEPARTMENT ENCOUNTER      Pt Name: Georgie Winston  MRN: [id number]  YOB: 1990  Date of evaluation: 7/16/2025  Provider: Lynne Moscoso PA-C    CHIEF COMPLAINT       Chief Complaint   Patient presents with    Hypertension    Dizziness     HISTORY OF PRESENT ILLNESS  (Location/Symptom, Timing/Onset, Context/Setting, Quality, Duration, Modifying Factors, Severity.)   Georgie Winston is a 35 y.o. female who presents to the emergency department with multiple complaints.  Patient states she felt dizzy this morning and took her blood pressure and found it was elevated at home.  Patient denies a history of hypertension.    She is also complaining of diffuse abdominal pain with chronic diarrhea.  She states she is scheduled to have a colonoscopy and EGD upcoming.    Nursing notes were reviewed.  REVIEW OF SYSTEMS    (2-9 systems for level 4, 10 or more for level 5)   Review of Systems   Constitutional:  Negative for chills and fever.   HENT:  Negative for ear pain and sore throat.    Respiratory:  Negative for cough and shortness of breath.    Cardiovascular:  Negative for chest pain.   Gastrointestinal:  Positive for abdominal pain and diarrhea. Negative for nausea and vomiting.   Genitourinary:  Negative for dysuria and frequency.   Musculoskeletal:  Negative for back pain and neck pain.   Neurological:  Positive for dizziness. Negative for headaches.      All systems reviewed and negative except for those discussed in HPI.   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    Ovarian cyst     Panic attack     Parsonage-Lozano syndrome     left shoulder    Rash and other nonspecific skin eruption     Thoracic nerve injury     left    Urinary tract infection      SURGICAL HISTORY       Past Surgical History:   Procedure Laterality Date    ADENOIDECTOMY      CERVICAL CONE BIOPSY  2023     SECTION N/A 2017    Procedure:  SECTION PRIMARY;  Surgeon: Antione Vazquez MD;  Location: [facility] LABOR DELIVERY;  Service:      SECTION      CYSTOSCOPY  2023    ENDOSCOPY AND COLONOSCOPY      PELVIC LAPAROSCOPY  2023    TONSILLECTOMY Bilateral     UMBILICAL HERNIA REPAIR      WISDOM TOOTH EXTRACTION       CURRENT MEDICATIONS     No current facility-administered medications for this encounter.    Current Outpatient Medications:     Blisovi FE  1-20 MG-MCG per tablet, TAKE 1 TABLET BY MOUTH EVERY DAY, Disp: 28 tablet, Rfl: 0    amitriptyline (ELAVIL) 10 MG tablet, Take 0.5 tablets by mouth Every Night. (Patient not taking: Reported on 2025), Disp: 15 tablet, Rfl: 11    colestipol (COLESTID) 1 g tablet, Take 1 tablet by mouth 2 (Two) Times a Day., Disp: 60 tablet, Rfl: 11    dicyclomine (BENTYL) 10 MG capsule, Take 1 capsule by mouth 3 (Three) Times a Day As Needed for Abdominal Cramping. (Patient not taking: Reported on 2025), Disp: 90 capsule, Rfl: 1    hydrOXYzine (ATARAX) 25 MG tablet, Take 1 tablet by mouth Every Night., Disp: 90 tablet, Rfl: 3    Loratadine (CLARITIN PO), Take  by mouth., Disp: , Rfl:     ondansetron ODT (ZOFRAN-ODT) 4 MG disintegrating tablet, Place 1 tablet on the tongue Every 8 (Eight) Hours As Needed for Nausea or Vomiting., Disp: 45 tablet, Rfl: 1    phenazopyridine (Pyridium) 200 MG tablet, Take 1 tablet by mouth 3 (Three) Times a Day As Needed for Bladder Spasms., Disp: 20 tablet, Rfl: 0    valACYclovir  (VALTREX) 1000 MG tablet, TAKE 1 TABLET BY MOUTH DAILY, Disp: 30 tablet, Rfl: 0    Vibegron (Gemtesa) 75 MG tablet, Take 1 tablet by mouth Daily., Disp: 30 tablet, Rfl: 11    ALLERGIES     Contrast dye (echo or unknown ct/mr), Shellfish allergy, Shellfish-derived products, Amoxicillin, Ciprofloxacin hcl, Erythromycin, Gabapentin, Zithromax [azithromycin], Demerol [meperidine], and Flagyl [metronidazole]    FAMILY HISTORY       Family History   Problem Relation Age of Onset    Hypertension Father     Diabetes Father     Heart attack Father     Osteoporosis Mother     Hypertension Mother     Diabetes Mother     Colon polyps Mother     Skin cancer Mother     Osteoporosis Maternal Aunt     Alcohol abuse Maternal Uncle     Liver disease Maternal Uncle     Colon cancer Neg Hx     Liver cancer Neg Hx     Stomach cancer Neg Hx     Esophageal cancer Neg Hx     Breast cancer Neg Hx     Ovarian cancer Neg Hx     Pancreatic cancer Neg Hx     Prostate cancer Neg Hx      SOCIAL HISTORY       Social History     Socioeconomic History    Marital status:    Tobacco Use    Smoking status: Former     Current packs/day: 0.00     Average packs/day: 0.5 packs/day for 3.0 years (1.5 ttl pk-yrs)     Types: Cigarettes     Start date: 2013     Quit date: 2016     Years since quittin.6     Passive exposure: Never    Smokeless tobacco: Never    Tobacco comments:     quit 2016   Vaping Use    Vaping status: Former    Substances: Nicotine, Flavoring    Devices: Pre-filled or refillable cartridge   Substance and Sexual Activity    Alcohol use: Not Currently    Drug use: No    Sexual activity: Yes     Partners: Male     Birth control/protection: OCP     PHYSICAL EXAM    (up to 7 for level 4, 8 or more for level 5)   Physical Exam  Vitals and nursing note reviewed. Exam conducted with a chaperone present.   HENT:      Head: Normocephalic and atraumatic.   Eyes:      Extraocular Movements: Extraocular movements intact.       Pupils: Pupils are equal, round, and reactive to light.   Cardiovascular:      Rate and Rhythm: Normal rate and regular rhythm.      Pulses: Normal pulses.   Pulmonary:      Effort: Pulmonary effort is normal.      Breath sounds: Normal breath sounds.   Abdominal:      General: Abdomen is flat. Bowel sounds are normal.      Palpations: Abdomen is soft.   Musculoskeletal:         General: Normal range of motion.      Cervical back: Normal range of motion.   Skin:     General: Skin is warm and dry.   Neurological:      General: No focal deficit present.      Mental Status: She is alert and oriented to person, place, and time.   Psychiatric:         Mood and Affect: Mood normal.         Behavior: Behavior normal.        DIAGNOSTIC RESULTS     EKG: All EKGs are interpreted by the Emergency Department Physician who either signs or Co-signs this chart in the absence of a cardiologist.    ECG 12 Lead Other; HTN   Final Result   Test Reason : Other~   Blood Pressure :   */*   mmHG   Vent. Rate :  71 BPM     Atrial Rate :  71 BPM      P-R Int : 130 ms          QRS Dur :  84 ms       QT Int : 376 ms       P-R-T Axes : -19  75  48 degrees     QTcB Int : 408 ms      Normal sinus rhythm   Normal ECG   No previous ECGs available   Confirmed by Max Cordero ([id number]) on 7/16/2025 5:41:03 PM      Referred By:            Confirmed By: Max Cordero        RADIOLOGY:   Non-plain film images such as CT, Ultrasound and MRI are read by the radiologist. Plain radiographic images are visualized and preliminarily interpreted by the emergency physician with the below findings:    [x] Radiologist's Report Reviewed:  CT Abdomen Pelvis Without Contrast   Final Result   Impression:   No acute findings in the abdomen or pelvis.            Electronically Signed: Mg Clark MD     7/16/2025 2:37 PM EDT     Workstation ID: [id number]      CT Head Without Contrast   Final Result   Impression:   No acute intracranial findings.             Electronically Signed: Mg Clark MD     7/16/2025 2:43 PM EDT     Workstation ID: [id number]      XR Chest 1 View   Final Result   Impression:   No acute cardiopulmonary findings.          Electronically Signed: Collin Giraldo MD     7/16/2025 1:34 PM EDT     Workstation ID: [id number]        ED BEDSIDE ULTRASOUND:   Performed by ED Physician - none    LABS:    I have reviewed and interpreted all of the currently available lab results from this visit (if applicable):  Results for orders placed or performed during the hospital encounter of 07/16/25   ECG 12 Lead Other; HTN    Collection Time: 07/16/25 12:55 PM   Result Value Ref Range    QT Interval 376 ms    QTC Interval 408 ms   Comprehensive Metabolic Panel    Collection Time: 07/16/25  1:01 PM    Specimen: Blood   Result Value Ref Range    Glucose 85 65 - 99 mg/dL    BUN 8.7 6.0 - 20.0 mg/dL    Creatinine 0.89 0.57 - 1.00 mg/dL    Sodium 140 136 - 145 mmol/L    Potassium 3.8 3.5 - 5.2 mmol/L    Chloride 106 98 - 107 mmol/L    CO2 21.6 (L) 22.0 - 29.0 mmol/L    Calcium 8.8 8.6 - 10.5 mg/dL    Total Protein 6.5 6.0 - 8.5 g/dL    Albumin 4.0 3.5 - 5.2 g/dL    ALT (SGPT) 17 1 - 33 U/L    AST (SGOT) 21 1 - 32 U/L    Alkaline Phosphatase 35 (L) 39 - 117 U/L    Total Bilirubin 0.3 0.0 - 1.2 mg/dL    Globulin 2.5 gm/dL    A/G Ratio 1.6 g/dL    BUN/Creatinine Ratio 9.8 7.0 - 25.0    Anion Gap 12.4 5.0 - 15.0 mmol/L    eGFR 86.8 >60.0 mL/min/1.73   High Sensitivity Troponin T    Collection Time: 07/16/25  1:01 PM    Specimen: Blood   Result Value Ref Range    HS Troponin T <6 <14 ng/L   hCG, Quantitative, Pregnancy    Collection Time: 07/16/25  1:01 PM    Specimen: Blood   Result Value Ref Range    HCG Quantitative <0.20 mIU/mL   CBC Auto Differential    Collection Time: 07/16/25  1:01 PM    Specimen: Blood   Result Value Ref Range    WBC 4.76 3.40 - 10.80 10*3/mm3    RBC 4.28 3.77 - 5.28 10*6/mm3    Hemoglobin 13.4 12.0 - 15.9 g/dL    Hematocrit 40.2 34.0 - 46.6 %     MCV 93.9 79.0 - 97.0 fL    MCH 31.3 26.6 - 33.0 pg    MCHC 33.3 31.5 - 35.7 g/dL    RDW 12.6 12.3 - 15.4 %    RDW-SD 43.8 37.0 - 54.0 fl    MPV 9.8 6.0 - 12.0 fL    Platelets 222 140 - 450 10*3/mm3    Neutrophil % 46.0 42.7 - 76.0 %    Lymphocyte % 41.4 19.6 - 45.3 %    Monocyte % 11.8 5.0 - 12.0 %    Eosinophil % 0.2 (L) 0.3 - 6.2 %    Basophil % 0.4 0.0 - 1.5 %    Immature Grans % 0.2 0.0 - 0.5 %    Neutrophils, Absolute 2.19 1.70 - 7.00 10*3/mm3    Lymphocytes, Absolute 1.97 0.70 - 3.10 10*3/mm3    Monocytes, Absolute 0.56 0.10 - 0.90 10*3/mm3    Eosinophils, Absolute 0.01 0.00 - 0.40 10*3/mm3    Basophils, Absolute 0.02 0.00 - 0.20 10*3/mm3    Immature Grans, Absolute 0.01 0.00 - 0.05 10*3/mm3   Lipase    Collection Time: 07/16/25  1:01 PM    Specimen: Blood   Result Value Ref Range    Lipase 34 13 - 60 U/L   Urinalysis With Microscopic If Indicated (No Culture) - Urine, Clean Catch    Collection Time: 07/16/25  2:11 PM    Specimen: Urine, Clean Catch   Result Value Ref Range    Color, UA Yellow Yellow, Straw    Appearance, UA Clear Clear    pH, UA 6.5 5.0 - 8.0    Specific Gravity, UA 1.010 1.005 - 1.030    Glucose, UA Negative Negative    Ketones, UA Trace (A) Negative    Bilirubin, UA Negative Negative    Blood, UA Negative Negative    Protein, UA Negative Negative    Leuk Esterase, UA Negative Negative    Nitrite, UA Negative Negative    Urobilinogen, UA 0.2 E.U./dL 0.2 - 1.0 E.U./dL   High Sensitivity Troponin T 1Hr    Collection Time: 07/16/25  2:11 PM    Specimen: Blood   Result Value Ref Range    HS Troponin T <6 <14 ng/L    Troponin T Numeric Delta        All other labs were within normal range or not returned as of this dictation.    EMERGENCY DEPARTMENT COURSE and DIFFERENTIAL DIAGNOSIS/MDM:   Vitals:    Vitals:    07/16/25 1400 07/16/25 1430 07/16/25 1445 07/16/25 1500   BP: 127/89   114/87   BP Location:       Patient Position:       Pulse: 71 75 89 74   Resp:       Temp:       TempSrc:       SpO2:  95% 94% 96% 93%   Weight:       Height:         MDM  Ddx: Crohn's, cholecystitis     Patient was evaluated, labs and imaging were obtained.  No acute abnormalities noted.  Patient advised to return to the ER if symptoms return.  Follow-up with your PCP.    I had a discussion with the patient/family regarding diagnosis, diagnostic results, treatment plan, and medications.  The patient/family indicated understanding of these instructions.  I spent adequate time at the bedside preceding discharge necessary to personally discuss the aftercare instructions, giving patient education, providing explanations of the results of our evaluations/findings, and my decision making to assure that the patient/family understand the plan of care.  Time was allotted to answer questions at that time and throughout the ED course.  Emphasis was placed on timely follow-up after discharge.  I also discussed the potential for the development of an acute emergent condition requiring further evaluation, admission, or even surgical intervention. I discussed that we found nothing during the visit today indicating the need for further workup, admission, or the presence of an unstable medical condition.  I encouraged the patient to return to the emergency department immediately for ANY concerns, worsening, new complaints, or if symptoms persist and unable to seek follow-up in a timely fashion.  The patient/family expressed understanding and agreement with this plan.  The patient will follow-up with her PCP for reevaluation.     MEDICATIONS ADMINISTERED IN ED:  Medications - No data to display    PROCEDURES:  Procedures:none      CRITICAL CARE TIME    Total Critical Care time was 0 minutes, excluding separately reportable procedures.   There was a high probability of clinically significant/life threatening deterioration in the patient's condition which required my urgent intervention.    FINAL IMPRESSION      1. Generalized abdominal pain    2.  Chronic diarrhea    3. Dizziness        DISPOSITION/PLAN     ED Disposition       ED Disposition   Discharge    Condition   Stable    Comment   --             PATIENT REFERRED TO:  Allyssa Aviles MD  [address] [place], [place] [id number] [phone]          DISCHARGE MEDICATIONS:     Medication List        CONTINUE taking these medications      amitriptyline 10 MG tablet  Commonly known as: ELAVIL  Take 0.5 tablets by mouth Every Night.     Blisovi FE 1/20 1-20 MG-MCG per tablet  Generic drug: norethindrone-ethinyl estradiol FE  TAKE 1 TABLET BY MOUTH EVERY DAY     CLARITIN PO     colestipol 1 g tablet  Commonly known as: COLESTID  Take 1 tablet by mouth 2 (Two) Times a Day.     dicyclomine 10 MG capsule  Commonly known as: BENTYL  Take 1 capsule by mouth 3 (Three) Times a Day As Needed for Abdominal Cramping.     Gemtesa 75 MG tablet  Generic drug: Vibegron  Take 1 tablet by mouth Daily.     hydrOXYzine 25 MG tablet  Commonly known as: ATARAX  Take 1 tablet by mouth Every Night.     ondansetron ODT 4 MG disintegrating tablet  Commonly known as: ZOFRAN-ODT  Place 1 tablet on the tongue Every 8 (Eight) Hours As Needed for Nausea or Vomiting.     phenazopyridine 200 MG tablet  Commonly known as: Pyridium  Take 1 tablet by mouth 3 (Three) Times a Day As Needed for Bladder Spasms.     valACYclovir 1000 MG tablet  Commonly known as: VALTREX  TAKE 1 TABLET BY MOUTH DAILY            Comment: Please note this report has been produced using speech recognition software.    Lynne Moscoso PA-C

## 2025-07-17 ENCOUNTER — TELEPHONE (OUTPATIENT)
Dept: GASTROENTEROLOGY | Facility: CLINIC | Age: 35
End: 2025-07-17
Payer: MEDICAID

## 2025-07-17 NOTE — TELEPHONE ENCOUNTER
I called patient concerning my chart message and informed her Ladan Keane ROD is out of office and she needs to follow up with her primary care doctor or visit urgent care. She has an upcoming appointment with Dr Saini for a double procedure (EGD and Colonoscopy) and office visit in October with Ladan. Patient verbalized understanding and had no further questions.

## 2025-07-24 ENCOUNTER — OFFICE VISIT (OUTPATIENT)
Dept: OBSTETRICS AND GYNECOLOGY | Facility: CLINIC | Age: 35
End: 2025-07-24
Payer: MEDICAID

## 2025-07-24 VITALS
WEIGHT: 140 LBS | BODY MASS INDEX: 25.76 KG/M2 | HEIGHT: 62 IN | DIASTOLIC BLOOD PRESSURE: 80 MMHG | SYSTOLIC BLOOD PRESSURE: 110 MMHG

## 2025-07-24 DIAGNOSIS — Z01.419 WELL WOMAN EXAM WITH ROUTINE GYNECOLOGICAL EXAM: Primary | ICD-10-CM

## 2025-07-24 RX ORDER — NORETHINDRONE ACETATE AND ETHINYL ESTRADIOL 1MG-20(21)
1 KIT ORAL DAILY
Qty: 84 TABLET | Refills: 3 | Status: SHIPPED | OUTPATIENT
Start: 2025-07-24

## 2025-07-24 NOTE — PROGRESS NOTES
Subjective   Chief Complaint   Patient presents with    Annual Exam     No c/c     Georgie Winston is a 35 y.o. year old  presenting to be seen for her annual exam.     SEXUAL Hx:  She is currently sexually active.  In the past year there there has been NO new sexual partners.    Condoms are never used.  She would not like to be screened for STD's at today's exam.  Current birth control method: OCP (estrogen/progesterone).  She is happy with her current method of contraception and does not want to discuss alternative methods of contraception.  MENSTRUAL Hx:  Patient's last menstrual period was 06/10/2025 (within days).  In the past 6 months her cycles have been regular, predictable and occur every 1-2 months.  Her menstrual flow is typically light.   Each month on average there are roughly 0 day(s) of very heavy flow.    Duration on average 5-6 daus   Intermenstrual bleeding is absent.    Post-coital bleeding is absent.  Dysmenorrhea: none  PMS: mild and is not affecting her activities of daily living  Her cycles are not a source of concern for her that she wishes to discuss today.  HEALTH Hx:  She exercises regularly: no (and has no plans to become more active).  She wears her seat belt: yes.  She has concerns about domestic violence: no.  OTHER THINGS SHE WANTS TO DISCUSS TODAY:  Nothing else    The following portions of the patient's history were reviewed and updated as appropriate:problem list, current medications, allergies, past family history, past medical history, past social history, and past surgical history.    Social History    Tobacco Use      Smoking status: Former        Packs/day: 0.00        Years: 0.5 packs/day for 3.0 years (1.5 ttl pk-yrs)        Types: Cigarettes        Start date: 2013        Quit date: 2016        Years since quittin.6        Passive exposure: Never      Smokeless tobacco: Never      Tobacco comments: quit 2016    Review of  "Systems  Constitutional POS: nothing reported    NEG: anorexia or night sweats   Genitourinary POS: nothing reported    NEG: dysuria or hematuria      Gastointestinal POS: diarrhea    NEG: bloating, melena, or reflux symptoms   Integument POS: nothing reported    NEG: moles that are changing in size, shape, color or rashes   Breast POS: nothing reported    NEG: persistent breast lump, skin dimpling, or nipple discharge        Objective   /80 (BP Location: Left arm, Patient Position: Sitting, Cuff Size: Adult)   Ht 157.5 cm (62\")   Wt 63.5 kg (140 lb)   LMP 06/10/2025 (Within Days)   BMI 25.61 kg/m²     General:  well developed; well nourished  no acute distress  mentation appropriate   Skin:  No suspicious lesions seen   Thyroid: normal to inspection and palpation   Breasts:  Examined in supine position  Symmetric without masses or skin dimpling  Nipples normal without inversion, lesions or discharge  There are no palpable axillary nodes   Abdomen: soft, non-tender; no masses  no umbilical or inguinal hernias are present  no hepato-splenomegaly   Pelvis: Clinical staff was present for exam  External genitalia:  normal appearance of the external genitalia including Bartholin's and [place]'s glands.  :  urethral meatus normal; urethra normal:  Vaginal:  normal pink mucosa without prolapse or lesions.  Cervix:  normal appearance.  Uterus:  normal size, shape and consistency. anteverted;  Adnexa:  normal bimanual exam of the adnexa.  Rectal:  digital rectal exam not performed; anus visually normal appearing.        Assessment   Normal GYN exam  OCP surveillance   History of [facility] in ~ 2023      Plan   Pap was done today.  If she does not receive the results of the Pap within 2 weeks  time, she was instructed to call to find out the results.  I explained to Georgie that the recommendations for Pap smear interval in a low risk patient's has lengthened to 3 years time.  I encouraged her to be seen yearly for a " full physical exam including breast and pelvic exam even during the off years when PAP's will not be performed.  Reviewed the importance of exercise 2-3 times per week with at least 20-30 minutes of cardio and incorporation of weight bearing exercises.   Reviewed okay to use Aquaphor in addition to Desitin due to irritation from chronic diarrhea.  She has a scheduled endoscopy and colonoscopy at the end of August.  Prescription(s) for OCP's were refilled today   The importance of keeping all planned follow-up and taking all medications as prescribed was emphasized.  Follow up for annual exam in ~ one year    New Medications Ordered This Visit   Medications    norethindrone-ethinyl estradiol FE (Blisovi FE 1/20) 1-20 MG-MCG per tablet     Sig: Take 1 tablet by mouth Daily.     Dispense:  84 tablet     Refill:  3          This note was electronically signed.    Lori Retana MD  July 24, 2025

## 2025-07-25 LAB — REF LAB TEST METHOD: NORMAL

## 2025-08-20 ENCOUNTER — OUTSIDE FACILITY SERVICE (OUTPATIENT)
Dept: GASTROENTEROLOGY | Facility: CLINIC | Age: 35
End: 2025-08-20
Payer: MEDICAID

## 2025-08-20 PROCEDURE — 45380 COLONOSCOPY AND BIOPSY: CPT | Performed by: INTERNAL MEDICINE

## 2025-08-20 PROCEDURE — 43239 EGD BIOPSY SINGLE/MULTIPLE: CPT | Performed by: INTERNAL MEDICINE

## 2025-08-23 ENCOUNTER — RESULTS FOLLOW-UP (OUTPATIENT)
Dept: GASTROENTEROLOGY | Facility: CLINIC | Age: 35
End: 2025-08-23
Payer: MEDICAID

## (undated) DEVICE — PK C/SECT 10

## (undated) DEVICE — SUT VIC 2/0 CT1 27IN J339H BX/36

## (undated) DEVICE — SKIN AFFIX SURG ADHESIVE 72/CS 0.55ML: Brand: MEDLINE

## (undated) DEVICE — GLV SURG BIOGEL LTX PF 7 1/2

## (undated) DEVICE — SOL IRR H2O BTL 1000ML STRL

## (undated) DEVICE — SUT PROLN 2/0 PC3 8833H

## (undated) DEVICE — COATED VICRYL  (POLYGLACTIN 910) SUTURE, VIOLET BRAIDED, STERILE, SYNTHETIC ABSORBABLE SUTURE: Brand: COATED VICRYL

## (undated) DEVICE — SUT PLAIN  3/0 CT1 27IN 842H

## (undated) DEVICE — 39" SINGLE PATIENT USE HOVERMATT: Brand: SINGLE PATIENT USE HOVERMATT

## (undated) DEVICE — TRY SPINE BLCK WHITACRE 25G 3X5IN

## (undated) DEVICE — SOL IRR NACL 0.9PCT BT 1000ML